# Patient Record
Sex: MALE | Race: WHITE | NOT HISPANIC OR LATINO | Employment: FULL TIME | URBAN - METROPOLITAN AREA
[De-identification: names, ages, dates, MRNs, and addresses within clinical notes are randomized per-mention and may not be internally consistent; named-entity substitution may affect disease eponyms.]

---

## 2017-05-22 ENCOUNTER — TRANSCRIBE ORDERS (OUTPATIENT)
Dept: ADMINISTRATIVE | Facility: HOSPITAL | Age: 37
End: 2017-05-22

## 2017-05-22 DIAGNOSIS — R13.19 ESOPHAGEAL DYSPHAGIA: Primary | ICD-10-CM

## 2017-05-25 ENCOUNTER — ALLSCRIPTS OFFICE VISIT (OUTPATIENT)
Dept: OTHER | Facility: OTHER | Age: 37
End: 2017-05-25

## 2017-05-25 DIAGNOSIS — R94.6 ABNORMAL RESULTS OF THYROID FUNCTION STUDIES: ICD-10-CM

## 2017-05-25 DIAGNOSIS — I10 ESSENTIAL (PRIMARY) HYPERTENSION: ICD-10-CM

## 2017-05-26 ENCOUNTER — APPOINTMENT (OUTPATIENT)
Dept: LAB | Facility: CLINIC | Age: 37
End: 2017-05-26
Payer: COMMERCIAL

## 2017-05-26 ENCOUNTER — TRANSCRIBE ORDERS (OUTPATIENT)
Dept: LAB | Facility: CLINIC | Age: 37
End: 2017-05-26

## 2017-05-26 ENCOUNTER — HOSPITAL ENCOUNTER (OUTPATIENT)
Dept: RADIOLOGY | Facility: HOSPITAL | Age: 37
Discharge: HOME | End: 2017-05-26
Payer: COMMERCIAL

## 2017-05-26 ENCOUNTER — GENERIC CONVERSION - ENCOUNTER (OUTPATIENT)
Dept: OTHER | Facility: OTHER | Age: 37
End: 2017-05-26

## 2017-05-26 DIAGNOSIS — I10 ESSENTIAL (PRIMARY) HYPERTENSION: ICD-10-CM

## 2017-05-26 DIAGNOSIS — R94.6 ABNORMAL RESULTS OF THYROID FUNCTION STUDIES: ICD-10-CM

## 2017-05-26 DIAGNOSIS — R13.19 ESOPHAGEAL DYSPHAGIA: ICD-10-CM

## 2017-05-26 LAB
ALBUMIN SERPL BCP-MCNC: 4 G/DL (ref 3.5–5)
ALP SERPL-CCNC: 55 U/L (ref 46–116)
ALT SERPL W P-5'-P-CCNC: 31 U/L (ref 12–78)
ANION GAP SERPL CALCULATED.3IONS-SCNC: 8 MMOL/L (ref 4–13)
AST SERPL W P-5'-P-CCNC: 20 U/L (ref 5–45)
BILIRUB SERPL-MCNC: 0.6 MG/DL (ref 0.2–1)
BUN SERPL-MCNC: 13 MG/DL (ref 5–25)
CALCIUM SERPL-MCNC: 9.4 MG/DL (ref 8.3–10.1)
CHLORIDE SERPL-SCNC: 103 MMOL/L (ref 100–108)
CHOLEST SERPL-MCNC: 215 MG/DL (ref 50–200)
CO2 SERPL-SCNC: 29 MMOL/L (ref 21–32)
CREAT SERPL-MCNC: 0.9 MG/DL (ref 0.6–1.3)
GFR SERPL CREATININE-BSD FRML MDRD: >60 ML/MIN/1.73SQ M
GLUCOSE P FAST SERPL-MCNC: 100 MG/DL (ref 65–99)
HDLC SERPL-MCNC: 50 MG/DL (ref 40–60)
LDLC SERPL CALC-MCNC: 147 MG/DL (ref 0–100)
POTASSIUM SERPL-SCNC: 4.3 MMOL/L (ref 3.5–5.3)
PROT SERPL-MCNC: 7.4 G/DL (ref 6.4–8.2)
SODIUM SERPL-SCNC: 140 MMOL/L (ref 136–145)
TRIGL SERPL-MCNC: 91 MG/DL
TSH SERPL DL<=0.05 MIU/L-ACNC: 3.08 UIU/ML (ref 0.36–3.74)

## 2017-05-26 PROCEDURE — 80061 LIPID PANEL: CPT

## 2017-05-26 PROCEDURE — 36415 COLL VENOUS BLD VENIPUNCTURE: CPT

## 2017-05-26 PROCEDURE — 84443 ASSAY THYROID STIM HORMONE: CPT

## 2017-05-26 PROCEDURE — 74220 X-RAY XM ESOPHAGUS 1CNTRST: CPT

## 2017-05-26 PROCEDURE — 80053 COMPREHEN METABOLIC PANEL: CPT

## 2017-08-01 ENCOUNTER — ALLSCRIPTS OFFICE VISIT (OUTPATIENT)
Dept: OTHER | Facility: OTHER | Age: 37
End: 2017-08-01

## 2017-08-01 DIAGNOSIS — M54.2 CERVICALGIA: ICD-10-CM

## 2017-08-01 DIAGNOSIS — M54.50 LOW BACK PAIN: ICD-10-CM

## 2017-10-13 ENCOUNTER — ALLSCRIPTS OFFICE VISIT (OUTPATIENT)
Dept: OTHER | Facility: OTHER | Age: 37
End: 2017-10-13

## 2017-10-14 NOTE — PROGRESS NOTES
Assessment  1  Benign essential hypertension (401 1) (I10)   2  Chronic low back pain (724 2,338 29) (M54 5,G89 29)   3  Chronic neck pain (723 1,338 29) (M54 2,G89 29)   4  Anxiety (300 00) (F41 9)   5  Need for prophylactic vaccination and inoculation against influenza (V04 81) (Z23)    Plan  Anxiety    · LORazepam 1 MG Oral Tablet; TAKE 1 TABLET 2TIMES DAILY AS NEEDED  Benign essential hypertension    · Begin a limited exercise program ; Status:Complete;   Done: 73PKO0923   · Restrict the salt in your diet by avoiding highly salted foods ; Status:Complete;   Done:  14BSC4196   · Follow-up visit in 6 months Evaluation and Treatment  Follow-up  Status: Complete   Done: 46HDO6377  Need for prophylactic vaccination and inoculation against influenza    · Fluzone Quadrivalent 0 5 ML Intramuscular Suspension Prefilled Syringe  Screening for depression    · *VB - PHQ-9 Tool; Status:Active; Requested [id number];     Chief Complaint  Patient here with back pain will be traveling to [place] worried how back will be on a long plane ride      History of Present Illness  HPI: feeling pretty good with backto fly to [employer] for work   Neck Pain (Follow-Up): The patient is being seen for follow-up of neck pain  The patient reports doing well and with stretching, massage and occ muscle relaxer  He has had no significant interval events  Interval symptoms:  stable neck pain-- and-- stable neck stiffness  Hypertension (Follow-Up): The patient presents for follow-up of essential hypertension  The patient states he has been doing well with his blood pressure control since the last visit  Symptoms:   Medications: the patient is adherent with his medication regimen  Back Pain Lumbar, Chronic (Brief): The patient is being seen for a routine clinic follow-up of chronic low back pain  The patient is currently asymptomatic  No associated symptoms are reported  Current treatment includes heat, muscle relaxants and massage   By report, there is good compliance with treatment  Review of Systems    Constitutional: no fever or chills, feels well, no tiredness, no recent weight loss or weight gain  ENT: no complaints of earache, no loss of hearing, no nosebleeds or nasal discharge, no sore throat or hoarseness  Cardiovascular: no complaints of slow or fast heart rate, no chest pain, no palpitations, no leg claudication or lower extremity edema  Respiratory: no complaints of shortness of breath, no wheezing or cough, no dyspnea on exertion, no orthopnea or PND  Gastrointestinal: no complaints of abdominal pain, no constipation, no nausea or vomiting, no diarrhea or bloody stools  Genitourinary: no complaints of dysuria or incontinence, no hesitancy, no nocturia, no genital lesion, no inadequacy of penile erection  Musculoskeletal: neck and back pain, but-- as noted in HPI  Integumentary: no complaints of skin rash or lesion, no itching or dry skin, no skin wounds  Neurological: no complaints of headache, no confusion, no numbness or tingling, no dizziness or fainting  Over the past 2 weeks, how often have you been bothered by the following problems? 1 ) Little interest or pleasure in doing things? Not at all    2 ) Feeling down, depressed or hopeless? Not at all    3 ) Trouble falling asleep or sleeping too much? Not at all    4 ) Feeling tired or having little energy? Not at all    5 ) Poor appetite or overeating? Not at all    6 ) Feeling bad about yourself, or that you are a failure, or have let yourself or your family down? Not at all    7 ) Trouble concentrating on things, such as reading a newspaper or watching television? Not at all    8 ) Moving or speaking so slowly that other people could have noticed, or the opposite, moving or speaking faster than usual? Not at all    9 ) Thoughts that you would be better off dead or of hurting yourself in some way? Not at all  Active Problems  1   Anxiety (300 00) (F41 9) 2  Benign essential hypertension (401 1) (I10)   3  Borderline hyperlipidemia (272 4) (E78 5)   4  Chronic low back pain (724 2,338 29) (M54 5,G89 29)   5  Chronic neck pain (723 1,338 29) (M54 2,G89 29)   6  GERD (gastroesophageal reflux disease) (530 81) (K21 9)   7  Need for prophylactic vaccination and inoculation against influenza (V04 81) (Z23)   8  Screening for depression (V79 0) (Z13 89)   9  Vitamin D deficiency (268 9) (E55 9)    Past Medical History  1  Need for prophylactic vaccination and inoculation against influenza (V04 81) (Z23)  Active Problems And Past Medical History Reviewed: The active problems and past medical history were reviewed and updated today  Family History  Father    1  Family history of Diabetes Mellitus (V18 0)   2  Family history of Hypertension (V17 49)  Brother    3  Family history of Hypertension (V17 49)  Paternal Grandmother    3  Family history of Breast Cancer (V16 3)  Paternal Grandfather    5  Family history of Acute Myocardial Infarction (V17 3)  Family History    6  Family history of Family Health Status Siblings 1  Living [employer]  Family History Reviewed: The family history was reviewed and updated today  Social History   · Being A Social Drinker   · Daily Coffee Consumption (2  Cups/Day)   · Denied: History of Drug Use   · Never A Smoker  The social history was reviewed and updated today  Surgical History  1  History of Tonsillectomy With Adenoidectomy  Surgical History Reviewed: The surgical history was reviewed and updated today  Current Meds   1  Acyclovir 400 MG Oral Tablet; 1 tablet po 5x daily; Therapy: 96SRK5666 to (Evaluate:01Jun2020)  Requested for: 22Xyw0430; Last   Rx:12Vcm5578 Ordered   2  Amlodipine Besy-Benazepril HCl - 10-20 MG Oral Capsule; TAKE ONE CAPSULE BY   MOUTH EVERY DAY; Therapy: 47ONM7625 to (Evaluate:88Col4162)  Requested for: 06XAT9762; Last   Rx:22Jun2017 Ordered   3   LORazepam 1 MG Oral Tablet; TAKE 1 TABLET 2TIMES DAILY AS NEEDED; Therapy: 36MNA1649 to (Evaluate:28Bgj3357)  Requested for: 91IVR2379; Last   Rx:69Dqh1933 Ordered   4  Naproxen 500 MG Oral Tablet; TAKE 1 TABLET EVERY 12 HOURS AS NEEDED; Therapy: 37Rlu7723 to (Mariama Hendersongricelda)  Requested for: 75Bzl1678; Last   Rx:76Azq5761 Ordered    The medication list was reviewed and updated today  Allergies  1  No Known Drug Allergies  2  Other   3  Pollen   4  Seasonal    Vitals   Recorded: 61MRL2154 09:56AM   Heart Rate 72    Respiration 18    Systolic 687    Diastolic 64    Patient Refused Height Yes Yes   Patient Refused Weight Yes Yes     Physical Exam    Constitutional   General appearance: No acute distress, well appearing and well nourished  Pulmonary   Respiratory effort: No increased work of breathing or signs of respiratory distress  Auscultation of lungs: Clear to auscultation, equal breath sounds bilaterally, no wheezes, no rales, no rhonci  Cardiovascular   Auscultation of heart: Normal rate and rhythm, normal S1 and S2, without murmurs  Examination of extremities for edema and/or varicosities: Normal     Abdomen   Abdomen: Non-tender, no masses  Liver and spleen: No hepatomegaly or splenomegaly  Musculoskeletal   Inspection/palpation of joints, bones, and muscles: Abnormal  -- min neck abd back spasm  Message  Return to work or school:   Armando Perry is under my professional care  He was seen in my office on 10/13/17       Due to anxiety, chronic muscle and back pain, it is highly recommended that Mr Timmy Awad be able to fully recline in a sleeping position for flights greater than 6 hours          Future Appointments    Date/Time Provider Specialty Site   04/17/2018 08:00 AM Ursula Snell DO Family Medicine [facility]     Signatures   Electronically signed by : Kate Becker DO; Oct 13 2017 10:53AM EST                       (Author)

## 2018-01-13 VITALS
SYSTOLIC BLOOD PRESSURE: 142 MMHG | DIASTOLIC BLOOD PRESSURE: 72 MMHG | HEART RATE: 64 BPM | HEIGHT: 68 IN | WEIGHT: 184.38 LBS | RESPIRATION RATE: 16 BRPM | BODY MASS INDEX: 27.94 KG/M2

## 2018-01-13 NOTE — MISCELLANEOUS
Provider Comments  Provider Comments:   Our records indicate that you missed an appointment on 09/12/2016  If you have not done so already, please call our office and we will be happy to schedule another appointment for you  If you must cancel your appointment, our office policy requires you to call our office at least 24 hours in advance so that we may utilize your appointment time for another patient who requires our services  If you have any questions, please contact our office at [phone]           Signatures   Electronically signed by : Clement Roberts, ; Sep 19 2016  2:30PM EST                       (Author)

## 2018-01-14 VITALS
WEIGHT: 184.38 LBS | RESPIRATION RATE: 18 BRPM | DIASTOLIC BLOOD PRESSURE: 70 MMHG | HEART RATE: 72 BPM | HEIGHT: 68 IN | BODY MASS INDEX: 27.94 KG/M2 | TEMPERATURE: 97 F | SYSTOLIC BLOOD PRESSURE: 120 MMHG

## 2018-01-14 VITALS — SYSTOLIC BLOOD PRESSURE: 122 MMHG | HEART RATE: 72 BPM | RESPIRATION RATE: 18 BRPM | DIASTOLIC BLOOD PRESSURE: 64 MMHG

## 2018-01-14 NOTE — MISCELLANEOUS
Message  Return to work or school:   Nay Bill is under my professional care  He was seen in my office on 10/13/17       Due to anxiety, chronic muscle and back pain, it is highly recommended that Mr Esme Bellamy be able to fully recline in a sleeping position for flights greater than 6 hours          Future Appointments    Signatures   Electronically signed by : Ben Garay DO; Oct 13 2017 10:53AM EST                       (Author)

## 2018-01-18 NOTE — RESULT NOTES
Discussion/Summary   TRIGLYCERIDES AND THYROID LEVEL IS BETTER    BLOOD SUGAR AND BAD CHOLESTEROL HIGH      [facility]     Verified Results  (1) COMPREHENSIVE METABOLIC PANEL 04PWZ9968 14:62AU Rosa Isela Shekhar Order Number: [id number]     Test Name Result Flag Reference   SODIUM 140 mmol/L  136-145   POTASSIUM 4 3 mmol/L  3 5-5 3   CHLORIDE 103 mmol/L  100-108   CARBON DIOXIDE 29 mmol/L  21-32   ANION GAP (CALC) 8 mmol/L  4-13   BLOOD UREA NITROGEN 13 mg/dL  5-25   CREATININE 0 90 mg/dL  0 60-1 30   Standardized to IDMS reference method   CALCIUM 9 4 mg/dL  8 3-10 1   BILI, TOTAL 0 60 mg/dL  0 20-1 00   ALK PHOSPHATAS 55 U/L     ALT (SGPT) 31 U/L  12-78   AST(SGOT) 20 U/L  5-45   ALBUMIN 4 0 g/dL  3 5-5 0   TOTAL PROTEIN 7 4 g/dL  6 4-8 2   eGFR Non-African American      >60 0 ml/min/1 73[address] [facility] [employer] Disease Education Program recommendations are as follows:  GFR calculation is accurate only with a steady state creatinine  Chronic Kidney disease less than 60 ml/min/1 73 sq  meters  Kidney failure less than 15 ml/min/1 73 sq  meters     GLUCOSE FASTING 100 mg/dL H 65-99     (1) LIPID PANEL FASTING W DIRECT LDL REFLEX 51ARD5515 09:00AM Rosa Isela Corrigan Order Number: [id number]     Test Name Result Flag Reference   CHOLESTEROL 215 mg/dL H    LDL CHOLESTEROL CALCULATED 147 mg/dL H 0-100   Triglyceride:         Normal              <150 mg/dl       Borderline High    150-199 mg/dl       High               200-499 mg/dl       Very High          >499 mg/dl  Cholesterol:         Desirable        <200 mg/dl      Borderline High  200-239 mg/dl      High             >239 mg/dl  HDL Cholesterol:        High    >59 mg/dL      Low     <41 mg/dL  LDL Cholesterol:        Optimal          <100 mg/dl        Near Optimal     100-129 mg/dl        Above Optimal          Borderline High   130-159 mg/dl          High              160-189 mg/dl          Very High        >189 mg/dl  LDL CALCULATED:    This screening LDL is a calculated result  It does not have the accuracy of the Direct Measured LDL in the monitoring of patients with hyperlipidemia and/or statin therapy  Direct Measure LDL (ZBF250) must be ordered separately in these patients  TRIGLYCERIDES 91 mg/dL  <=150   Specimen collection should occur prior to N-Acetylcysteine or Metamizole administration due to the potential for falsely depressed results  HDL,DIRECT 50 mg/dL  40-60   Specimen collection should occur prior to Metamizole administration due to the potential for falsely depressed results  (1) TSH WITH FT4 REFLEX 80UQE4673 09:00AM Lizet Perdomo Order Number: [id number]     Test Name Result Flag Reference   TSH 3 082 uIU/mL  0 358-3 740   Patients undergoing fluorescein dye angiography may retain small amounts of fluorescein in the body for 48-72 hours post procedure  Samples containing fluorescein can produce falsely depressed TSH values  If the patient had this procedure,a specimen should be resubmitted post fluorescein clearance

## 2018-04-15 PROBLEM — G89.29 CHRONIC LOW BACK PAIN: Status: ACTIVE | Noted: 2017-08-01

## 2018-04-15 PROBLEM — M54.2 CHRONIC NECK PAIN: Status: ACTIVE | Noted: 2017-08-01

## 2018-04-15 PROBLEM — M54.50 CHRONIC LOW BACK PAIN: Status: ACTIVE | Noted: 2017-08-01

## 2018-04-15 PROBLEM — K21.9 GERD (GASTROESOPHAGEAL REFLUX DISEASE): Status: ACTIVE | Noted: 2017-05-25

## 2018-04-15 PROBLEM — G89.29 CHRONIC NECK PAIN: Status: ACTIVE | Noted: 2017-08-01

## 2018-04-25 DIAGNOSIS — M54.2 CHRONIC NECK AND BACK PAIN: Primary | ICD-10-CM

## 2018-04-25 DIAGNOSIS — G89.29 CHRONIC NECK AND BACK PAIN: Primary | ICD-10-CM

## 2018-04-25 DIAGNOSIS — M54.9 CHRONIC NECK AND BACK PAIN: Primary | ICD-10-CM

## 2018-04-26 RX ORDER — CYCLOBENZAPRINE HCL 5 MG
10 TABLET ORAL
Qty: 10 TABLET | Refills: 1 | OUTPATIENT
Start: 2018-04-26 | End: 2018-05-06

## 2018-05-10 ENCOUNTER — OFFICE VISIT (OUTPATIENT)
Dept: FAMILY MEDICINE CLINIC | Facility: CLINIC | Age: 38
End: 2018-05-10
Payer: COMMERCIAL

## 2018-05-10 VITALS
BODY MASS INDEX: 27.83 KG/M2 | DIASTOLIC BLOOD PRESSURE: 64 MMHG | RESPIRATION RATE: 14 BRPM | HEIGHT: 68 IN | WEIGHT: 183.6 LBS | SYSTOLIC BLOOD PRESSURE: 132 MMHG | HEART RATE: 72 BPM

## 2018-05-10 DIAGNOSIS — G89.29 CHRONIC BILATERAL LOW BACK PAIN WITHOUT SCIATICA: ICD-10-CM

## 2018-05-10 DIAGNOSIS — M54.50 CHRONIC BILATERAL LOW BACK PAIN WITHOUT SCIATICA: ICD-10-CM

## 2018-05-10 DIAGNOSIS — E78.5 BORDERLINE HYPERLIPIDEMIA: ICD-10-CM

## 2018-05-10 DIAGNOSIS — Z00.00 WELL ADULT EXAM: Primary | ICD-10-CM

## 2018-05-10 DIAGNOSIS — E55.9 VITAMIN D DEFICIENCY: ICD-10-CM

## 2018-05-10 DIAGNOSIS — F41.9 ANXIETY: ICD-10-CM

## 2018-05-10 DIAGNOSIS — I10 BENIGN ESSENTIAL HYPERTENSION: ICD-10-CM

## 2018-05-10 PROCEDURE — 99395 PREV VISIT EST AGE 18-39: CPT | Performed by: FAMILY MEDICINE

## 2018-05-10 RX ORDER — CYCLOBENZAPRINE HCL 5 MG
5 TABLET ORAL 3 TIMES DAILY PRN
Qty: 10 TABLET | Refills: 0 | Status: SHIPPED | OUTPATIENT
Start: 2018-05-10 | End: 2018-10-10

## 2018-05-10 RX ORDER — FLUTICASONE PROPIONATE 50 MCG
1 SPRAY, SUSPENSION (ML) NASAL DAILY
COMMUNITY
End: 2018-10-10

## 2018-05-10 NOTE — PATIENT INSTRUCTIONS
Thank you for enrolling in [facility]  Please follow the instructions below to securely access your online medical record  [employer] allows you to send messages to your doctor, view your test results, renew your prescriptions, schedule appointments, and more  [facility] uses Single Sign on (SSO) Technology for you to log in and access our [address]  [employer], including [place]  No more remembering multiple user names and passwords! We are going to guide you through, step by step, to help you set up your Chasebrenda Elaine account which will provide access to your [employer] account  How Do I Sign Up? 1  In your Internet browser, go to [email or web address] - [employer] [email or web address]       2  Click on the   [employer] Search patient account and then click Dont have an                 Account? Create one now      3  Enter your demographic information and chose a user name (email address) and password  Think of one that is secure and easy to remember  Enter a Referral code if you have one (this is not your [employer] code ) Accept the Terms and Conditions and the Privacy Policy  4  Select your security questions that you will use to reset your password should you forget it  Click Submit  5  Enter your [employer] Activation Code exactly as it appears below  You will not need to use this code after you have completed the sign-up process  If you do not sign up before the expiration date, you must request a new code  [place] Activation Code: [email or web address]  Expires: 5/13/2018  1:38 PM    6  Confirm your email address  An email confirmation was sent to you  Please open that email and click Confirm your Email   You should then be redirected to our [facility] Single sign on page, where you will log on with the user name and password you created! Proceed to the [place] Icon to view your personal health information          Additional Information  If you have questions, you can e-mail patient  [email or web address] or call [phone] to talk to our customer support staff  Remember, MyChart is NOT to be used for urgent needs  For medical emergencies, dial 911

## 2018-05-10 NOTE — PROGRESS NOTES
FAMILY PRACTICE HEALTH MAINTENANCE OFFICE VISIT  [facility]    NAME: Talia Lopez  AGE: 40 y o  SEX: male  : 1980     DATE: 5/10/2018    Assessment and Plan     Problem List Items Addressed This Visit     Anxiety    Relevant Orders    Ambulatory referral to Psychiatry    Benign essential hypertension    Relevant Orders    CBC    Borderline hyperlipidemia    Relevant Orders    Comprehensive metabolic panel    CBC    Lipid Panel with Direct LDL reflex    TSH, 3rd generation with T4 reflex    Chronic low back pain    Relevant Medications    cyclobenzaprine (FLEXERIL) 5 mg tablet    Vitamin D deficiency    Relevant Orders    TSH, 3rd generation with T4 reflex    Vitamin D 25 hydroxy    Well adult exam - Primary     Up to date                 · Patient Counseling:   · Nutrition: Stressed importance of a well balanced diet, moderation of sodium/saturated fat, caloric balance and sufficient intake of fiber  · Exercise: Stressed the importance of regular exercise with a goal of 150 minutes per week  · Dental Health: Discussed daily flossing and brushing and regular dental visits   · Alcohol Use:  Recommended moderation of alcohol intake  · Injury Prevention: Discussed Safety Belts, Safety Helmets, and Smoke Detectors    · Immunizations reviewed  Up to date  · Discussed benefits of screening up to date  · Discussed the patient's BMI with him  The BMI is in the acceptable range     Return in 1 year (on 5/10/2019)          Chief Complaint     Chief Complaint   Patient presents with    Physical Exam     Patient here for annual wellness exam        History of Present Illness     Here for wellness  Getting allergy shot- Dr Rivera Slight started last week  Stressful life- Seeing Dr Liberty Avila- psych- has seen him 4 times, doesn't want to cont relationship  Back has been spasming intermittently- poss stress related          Well Adult Physical   Patient here for a comprehensive physical exam       Diet and Physical Activity  Diet: well balanced diet  Weight concerns: None, patient's BMI is between 18 5-24 9  Exercise: intermittently      Depression Screen  PHQ-9 Depression Screening    PHQ-9:    Frequency of the following problems over the past two weeks:       Little interest or pleasure in doing things:  0 - not at all  PHQ-2 Score:  0          General Health  Hearing: Normal:  bilateral  Vision: no vision problems  Dental: regular dental visits and brushes teeth twice daily    Reproductive Health  No concerns      The following portions of the patient's history were reviewed and updated as appropriate: allergies, current medications, past family history, past medical history, past social history, past surgical history and problem list     Review of Systems     Review of Systems   Constitutional: Negative  HENT: Negative  Eyes: Negative  Respiratory: Negative  Cardiovascular: Negative  Gastrointestinal: Negative  Endocrine: Negative  Genitourinary: Negative  Musculoskeletal: Positive for back pain  Allergic/Immunologic: Negative  Neurological: Negative  Hematological: Negative  Psychiatric/Behavioral: The patient is nervous/anxious          Past Medical History     Past Medical History:   Diagnosis Date    Allergic        Past Surgical History     Past Surgical History:   Procedure Laterality Date    TONSILECTOMY AND ADNOIDECTOMY         Social History     Social History     Social History    Marital status: /Civil Union     Spouse name: N/A    Number of children: N/A    Years of education: N/A     Social History Main Topics    Smoking status: Never Smoker    Smokeless tobacco: Never Used    Alcohol use Yes      Comment: Social    Drug use: No    Sexual activity: Yes     Other Topics Concern    None     Social History Narrative    Daily coffee consumption (2 cups/day)           Family History     Family History   Problem Relation Age of Onset    Diabetes Father     Hypertension Father     Hypertension Brother     Breast cancer Paternal Grandmother     Other Paternal Grandfather      Acute myocardial infarction        Current Medications       Current Outpatient Prescriptions:     amLODIPine-benazepril (LOTREL) 10-20 MG per capsule, Take 1 capsule by mouth daily, Disp: , Rfl:     citalopram (CeleXA) 20 mg tablet, Take 20 mg by mouth daily, Disp: , Rfl: 1    fluticasone (FLONASE) 50 mcg/act nasal spray, 1 spray into each nostril daily, Disp: , Rfl:     LORazepam (ATIVAN) 0 5 mg tablet, Take 0 5 mg by mouth every evening, Disp: , Rfl: 1    naproxen (NAPROSYN) 500 mg tablet, Take 1 tablet by mouth every 12 (twelve) hours as needed, Disp: , Rfl:     cyclobenzaprine (FLEXERIL) 5 mg tablet, Take 1 tablet (5 mg total) by mouth 3 (three) times a day as needed for muscle spasms for up to 7 days, Disp: 10 tablet, Rfl: 0     Allergies     Allergies   Allergen Reactions    Pollen Extract        Objective     /64   Pulse 72   Resp 14   Ht 5' 7 99" (1 727 m)   Wt 83 3 kg (183 lb 9 6 oz)   BMI 27 92 kg/m²      Physical Exam   Constitutional: He is oriented to person, place, and time  Vital signs are normal  He appears well-developed and well-nourished  HENT:   Head: Normocephalic and atraumatic  Right Ear: External ear normal    Left Ear: External ear normal    Nose: Nose normal    Mouth/Throat: Oropharynx is clear and moist    Eyes: Conjunctivae, EOM and lids are normal  Pupils are equal, round, and reactive to light  Neck: Normal range of motion  Neck supple  Cardiovascular: Normal rate, regular rhythm, S1 normal, S2 normal, normal heart sounds, intact distal pulses and normal pulses  Pulmonary/Chest: Effort normal and breath sounds normal    Abdominal: Soft  Normal appearance and bowel sounds are normal    Musculoskeletal: Normal range of motion  Neurological: He is alert and oriented to person, place, and time   He has normal strength and normal reflexes  Skin: Skin is warm and dry  Psychiatric: He has a normal mood and affect  His speech is normal and behavior is normal  Judgment and thought content normal  Cognition and memory are normal    Nursing note and vitals reviewed          No exam data present    Health Maintenance     Health Maintenance   Topic Date Due    INFLUENZA VACCINE  09/01/2018    Depression Screening PHQ-9  05/10/2019    HIV SCREENING  11/14/2019    DTaP,Tdap,and Td Vaccines (2 - Td) 06/28/2023     Immunization History   Administered Date(s) Administered    Influenza Quadrivalent Preservative Free 3 years and older IM 01/15/2014, 12/14/2016, 10/13/2017    Influenza TIV (IM) 02/11/2016    Tdap 06/28/2013    Tuberculin Skin Test-PPD Intradermal 05/20/2016       DO [facility]

## 2018-05-29 DIAGNOSIS — F41.9 ANXIETY: Primary | ICD-10-CM

## 2018-05-31 RX ORDER — LORAZEPAM 0.5 MG/1
0.5 TABLET ORAL EVERY EVENING
Qty: 30 TABLET | Refills: 0 | Status: SHIPPED | OUTPATIENT
Start: 2018-05-31 | End: 2018-07-09 | Stop reason: SDUPTHER

## 2018-06-01 ENCOUNTER — TELEPHONE (OUTPATIENT)
Dept: FAMILY MEDICINE CLINIC | Facility: CLINIC | Age: 38
End: 2018-06-01

## 2018-06-28 DIAGNOSIS — F41.1 GENERALIZED ANXIETY DISORDER: Primary | ICD-10-CM

## 2018-06-28 RX ORDER — CITALOPRAM 20 MG/1
TABLET ORAL
Qty: 30 TABLET | Refills: 5 | Status: SHIPPED | OUTPATIENT
Start: 2018-06-28 | End: 2019-01-03 | Stop reason: SDUPTHER

## 2018-07-03 DIAGNOSIS — I10 ESSENTIAL HYPERTENSION: Primary | ICD-10-CM

## 2018-07-03 RX ORDER — AMLODIPINE BESYLATE AND BENAZEPRIL HYDROCHLORIDE 10; 20 MG/1; MG/1
CAPSULE ORAL
Qty: 30 CAPSULE | Refills: 5 | Status: SHIPPED | OUTPATIENT
Start: 2018-07-03 | End: 2019-01-03 | Stop reason: SDUPTHER

## 2018-07-09 DIAGNOSIS — F41.9 ANXIETY: ICD-10-CM

## 2018-07-09 RX ORDER — LORAZEPAM 0.5 MG/1
0.5 TABLET ORAL EVERY EVENING
Qty: 30 TABLET | Refills: 3 | Status: SHIPPED | OUTPATIENT
Start: 2018-07-09 | End: 2018-10-10

## 2018-07-10 ENCOUNTER — TELEPHONE (OUTPATIENT)
Dept: FAMILY MEDICINE CLINIC | Facility: CLINIC | Age: 38
End: 2018-07-10

## 2018-07-10 DIAGNOSIS — G47.00 INSOMNIA, UNSPECIFIED TYPE: Primary | ICD-10-CM

## 2018-07-10 RX ORDER — ZOLPIDEM TARTRATE 5 MG/1
5 TABLET ORAL
Qty: 7 TABLET | Refills: 0 | Status: SHIPPED | OUTPATIENT
Start: 2018-07-10 | End: 2018-10-10

## 2018-07-10 NOTE — TELEPHONE ENCOUNTER
patient called and wanted to know if you can prescribe him something to help him sleep on his flight to [place]   last seen 5/10/18  please advise

## 2018-07-10 NOTE — TELEPHONE ENCOUNTER
Spoke to patient yes he would like to try it he'll be gone for 1 week the script can be sent to [facility]

## 2018-10-09 NOTE — TELEPHONE ENCOUNTER
Note   SAIRA REACHING OUT TO LET US KNOW THAT THIS ISSUE HAS BEEN RESOLVED AND PHARMACY WAS ABLE TO FILL SCRIPT

## 2018-10-09 NOTE — TELEPHONE ENCOUNTER
SAIRA REACHING OUT TO LET US KNOW THAT THIS ISSUE HAS BEEN RESOLVED AND PHARMACY WAS ABLE TO FILL SCRIPT

## 2018-10-10 ENCOUNTER — HOSPITAL ENCOUNTER (EMERGENCY)
Facility: HOSPITAL | Age: 38
Discharge: HOME | End: 2018-10-10
Attending: EMERGENCY MEDICINE | Admitting: EMERGENCY MEDICINE
Payer: COMMERCIAL

## 2018-10-10 ENCOUNTER — APPOINTMENT (EMERGENCY)
Dept: RADIOLOGY | Facility: HOSPITAL | Age: 38
End: 2018-10-10
Payer: COMMERCIAL

## 2018-10-10 VITALS
HEIGHT: 67 IN | HEART RATE: 54 BPM | SYSTOLIC BLOOD PRESSURE: 137 MMHG | WEIGHT: 185 LBS | RESPIRATION RATE: 18 BRPM | OXYGEN SATURATION: 96 % | BODY MASS INDEX: 29.03 KG/M2 | TEMPERATURE: 98.3 F | DIASTOLIC BLOOD PRESSURE: 74 MMHG

## 2018-10-10 DIAGNOSIS — R07.81 PLEURITIC CHEST PAIN: Primary | ICD-10-CM

## 2018-10-10 LAB
ALBUMIN SERPL BCP-MCNC: 3.8 G/DL (ref 3.5–5)
ALP SERPL-CCNC: 59 U/L (ref 46–116)
ALT SERPL W P-5'-P-CCNC: 38 U/L (ref 12–78)
ANION GAP SERPL CALCULATED.3IONS-SCNC: 4 MMOL/L (ref 4–13)
AST SERPL W P-5'-P-CCNC: 22 U/L (ref 5–45)
ATRIAL RATE: 53 BPM
BASOPHILS # BLD AUTO: 0.05 THOUSANDS/ΜL (ref 0–0.1)
BASOPHILS NFR BLD AUTO: 1 % (ref 0–1)
BILIRUB SERPL-MCNC: 0.91 MG/DL (ref 0.2–1)
BUN SERPL-MCNC: 14 MG/DL (ref 5–25)
CALCIUM SERPL-MCNC: 8.7 MG/DL (ref 8.3–10.1)
CHLORIDE SERPL-SCNC: 104 MMOL/L (ref 100–108)
CO2 SERPL-SCNC: 27 MMOL/L (ref 21–32)
CREAT SERPL-MCNC: 1.01 MG/DL (ref 0.6–1.3)
EOSINOPHIL # BLD AUTO: 0.09 THOUSAND/ΜL (ref 0–0.61)
EOSINOPHIL NFR BLD AUTO: 1 % (ref 0–6)
ERYTHROCYTE [DISTWIDTH] IN BLOOD BY AUTOMATED COUNT: 11.7 % (ref 11.6–15.1)
GFR SERPL CREATININE-BSD FRML MDRD: 94 ML/MIN/1.73SQ M
GLUCOSE SERPL-MCNC: 100 MG/DL (ref 65–140)
HCT VFR BLD AUTO: 42.6 % (ref 36.5–49.3)
HGB BLD-MCNC: 15 G/DL (ref 12–17)
IMM GRANULOCYTES # BLD AUTO: 0.02 THOUSAND/UL (ref 0–0.2)
IMM GRANULOCYTES NFR BLD AUTO: 0 % (ref 0–2)
LYMPHOCYTES # BLD AUTO: 1.97 THOUSANDS/ΜL (ref 0.6–4.47)
LYMPHOCYTES NFR BLD AUTO: 29 % (ref 14–44)
MCH RBC QN AUTO: 30.4 PG (ref 26.8–34.3)
MCHC RBC AUTO-ENTMCNC: 35.2 G/DL (ref 31.4–37.4)
MCV RBC AUTO: 86 FL (ref 82–98)
MONOCYTES # BLD AUTO: 0.59 THOUSAND/ΜL (ref 0.17–1.22)
MONOCYTES NFR BLD AUTO: 9 % (ref 4–12)
NEUTROPHILS # BLD AUTO: 4.12 THOUSANDS/ΜL (ref 1.85–7.62)
NEUTS SEG NFR BLD AUTO: 60 % (ref 43–75)
NRBC BLD AUTO-RTO: 0 /100 WBCS
P AXIS: 30 DEGREES
PLATELET # BLD AUTO: 301 THOUSANDS/UL (ref 149–390)
PMV BLD AUTO: 9.1 FL (ref 8.9–12.7)
POTASSIUM SERPL-SCNC: 3.7 MMOL/L (ref 3.5–5.3)
PR INTERVAL: 134 MS
PROT SERPL-MCNC: 7.5 G/DL (ref 6.4–8.2)
QRS AXIS: 64 DEGREES
QRSD INTERVAL: 106 MS
QT INTERVAL: 430 MS
QTC INTERVAL: 403 MS
RBC # BLD AUTO: 4.94 MILLION/UL (ref 3.88–5.62)
SODIUM SERPL-SCNC: 135 MMOL/L (ref 136–145)
T WAVE AXIS: 53 DEGREES
TROPONIN I SERPL-MCNC: <0.02 NG/ML
VENTRICULAR RATE: 53 BPM
WBC # BLD AUTO: 6.84 THOUSAND/UL (ref 4.31–10.16)

## 2018-10-10 PROCEDURE — 84484 ASSAY OF TROPONIN QUANT: CPT | Performed by: EMERGENCY MEDICINE

## 2018-10-10 PROCEDURE — 93010 ELECTROCARDIOGRAM REPORT: CPT | Performed by: INTERNAL MEDICINE

## 2018-10-10 PROCEDURE — 71046 X-RAY EXAM CHEST 2 VIEWS: CPT

## 2018-10-10 PROCEDURE — 80053 COMPREHEN METABOLIC PANEL: CPT | Performed by: EMERGENCY MEDICINE

## 2018-10-10 PROCEDURE — 85025 COMPLETE CBC W/AUTO DIFF WBC: CPT | Performed by: EMERGENCY MEDICINE

## 2018-10-10 PROCEDURE — 36415 COLL VENOUS BLD VENIPUNCTURE: CPT

## 2018-10-10 PROCEDURE — 99285 EMERGENCY DEPT VISIT HI MDM: CPT

## 2018-10-10 PROCEDURE — 93005 ELECTROCARDIOGRAM TRACING: CPT

## 2018-10-10 NOTE — ED PROVIDER NOTES
History  Chief Complaint   Patient presents with    Chest Pain     pt c/o left sided chest pain when taking a breathe for 2 days     HPI  Patient is 55-year-old male past medical history hypertension, anxiety presenting with left-sided pleuritic chest pain for past 5 days  Patient states that he was at work having a particularly stressful day when he noticed pain in the left side of his chest immediately inferior to his nipple  Patient states that the pain sharp, 6/10, nonradiating, present on large breaths, nonexertional   Patient states that the pain has been constant, unchanging since onset  Patient states that he ran several miles on the treadmill yesterday which did not worsen the pain  Patient denies palpitations, diaphoresis, nausea, vomiting, fever, chills, cough  Patient denies recent trauma, surgery, immobilization, previous blood clots  Prior to Admission Medications   Prescriptions Last Dose Informant Patient Reported? Taking? amLODIPine-benazepril (LOTREL) 10-20 MG per capsule 10/10/2018 at 0800  No Yes   Sig: TAKE ONE CAPSULE BY MOUTH EVERY DAY   citalopram (CeleXA) 20 mg tablet 10/10/2018 at 0800  No Yes   Sig: TAKE ONE TABLET BY MOUTH EVERY DAY      Facility-Administered Medications: None       Past Medical History:   Diagnosis Date    Allergic        Past Surgical History:   Procedure Laterality Date    TONSILECTOMY AND ADNOIDECTOMY         Family History   Problem Relation Age of Onset    Diabetes Father     Hypertension Father     Hypertension Brother     Breast cancer Paternal Grandmother     Other Paternal Grandfather         Acute myocardial infarction      I have reviewed and agree with the history as documented      Social History   Substance Use Topics    Smoking status: Never Smoker    Smokeless tobacco: Never Used    Alcohol use Yes      Comment: Social        Review of Systems   Constitutional: Negative for activity change, appetite change, chills, diaphoresis, fatigue and fever  HENT: Negative for postnasal drip, rhinorrhea and sore throat  Eyes: Negative for photophobia and visual disturbance  Respiratory: Negative for cough, chest tightness, shortness of breath and wheezing  Cardiovascular: Positive for chest pain (pleuritic)  Negative for palpitations and leg swelling  Gastrointestinal: Negative for abdominal distention, abdominal pain, constipation, diarrhea, nausea and vomiting  Genitourinary: Negative for difficulty urinating, dysuria, flank pain and hematuria  Musculoskeletal: Positive for gait problem  Negative for joint swelling and myalgias  Neurological: Positive for numbness  Negative for syncope, weakness and headaches  Psychiatric/Behavioral: Negative for confusion  Physical Exam  ED Triage Vitals [10/10/18 1406]   Temperature Pulse Respirations Blood Pressure SpO2   98 3 °F (36 8 °C) 65 18 154/83 97 %      Temp Source Heart Rate Source Patient Position - Orthostatic VS BP Location FiO2 (%)   Tympanic Monitor Sitting Left arm --      Pain Score       6           Orthostatic Vital Signs  Vitals:    10/10/18 1406 10/10/18 1621   BP: 154/83 137/74   Pulse: 65 (!) 54   Patient Position - Orthostatic VS: Sitting Sitting       Physical Exam   Constitutional: He is oriented to person, place, and time  He appears well-developed and well-nourished  No distress  HENT:   Head: Normocephalic and atraumatic  Right Ear: External ear normal    Left Ear: External ear normal    Nose: Nose normal    Mouth/Throat: Oropharynx is clear and moist  No oropharyngeal exudate  Eyes: Pupils are equal, round, and reactive to light  Conjunctivae are normal    Cardiovascular: Normal rate, regular rhythm, normal heart sounds and intact distal pulses  Exam reveals no gallop and no friction rub  No murmur heard  Pulmonary/Chest: Effort normal and breath sounds normal  No respiratory distress  He has no wheezes  He exhibits no tenderness  Abdominal: Soft   Bowel sounds are normal  He exhibits no distension and no mass  There is no tenderness  There is no rebound and no guarding  Musculoskeletal: He exhibits no edema or deformity  Neurological: He is alert and oriented to person, place, and time  Skin: Skin is warm and dry  Capillary refill takes less than 2 seconds  He is not diaphoretic  Psychiatric: He has a normal mood and affect  His behavior is normal    Nursing note and vitals reviewed  ED Medications  Medications - No data to display    Diagnostic Studies  Results Reviewed     Procedure Component Value Units Date/Time    Troponin I [[id number]]  (Normal) Collected:  10/10/18 1445    Lab Status:  Final result Specimen:  Blood from Arm, Left Updated:  10/10/18 1523     Troponin I <0 02 ng/mL     Comprehensive metabolic panel [[id number]]  (Abnormal) Collected:  10/10/18 1445    Lab Status:  Final result Specimen:  Blood from Arm, Left Updated:  10/10/18 1522     Sodium 135 (L) mmol/L      Potassium 3 7 mmol/L      Chloride 104 mmol/L      CO2 27 mmol/L      ANION GAP 4 mmol/L      BUN 14 mg/dL      Creatinine 1 01 mg/dL      Glucose 100 mg/dL      Calcium 8 7 mg/dL      AST 22 U/L      ALT 38 U/L      Alkaline Phosphatase 59 U/L      Total Protein 7 5 g/dL      Albumin 3 8 g/dL      Total Bilirubin 0 91 mg/dL      eGFR 94 ml/min/1 73sq m     Narrative:         National Kidney Disease Education Program recommendations are as follows:  GFR calculation is accurate only with a steady state creatinine  Chronic Kidney disease less than 60 ml/min/1 73 sq  meters  Kidney failure less than 15 ml/min/1 73 sq  meters      CBC and differential [[id number]] Collected:  10/10/18 1445    Lab Status:  Final result Specimen:  Blood from Arm, Left Updated:  10/10/18 1456     WBC 6 84 Thousand/uL      RBC 4 94 Million/uL      Hemoglobin 15 0 g/dL      Hematocrit 42 6 %      MCV 86 fL      MCH 30 4 pg      MCHC 35 2 g/dL      RDW 11 7 %      MPV 9 1 fL      Platelets 084 Thousands/uL      nRBC 0 /100 WBCs      Neutrophils Relative 60 %      Immat GRANS % 0 %      Lymphocytes Relative 29 %      Monocytes Relative 9 %      Eosinophils Relative 1 %      Basophils Relative 1 %      Neutrophils Absolute 4 12 Thousands/µL      Immature Grans Absolute 0 02 Thousand/uL      Lymphocytes Absolute 1 97 Thousands/µL      Monocytes Absolute 0 59 Thousand/µL      Eosinophils Absolute 0 09 Thousand/µL      Basophils Absolute 0 05 Thousands/µL                  X-ray chest 2 views   Final Result by Marek Curiel MD (10/10 1710)      No acute cardiopulmonary disease              Workstation performed: [id number]               Procedures  Procedures      Phone Consults  ED Phone Contact    ED Course         HEART Risk Score      Most Recent Value   History  0 Filed at: 10/10/2018 1635   ECG  1 Filed at: 10/10/2018 1635   Age  0 Filed at: 10/10/2018 1635   Risk Factors  1 Filed at: 10/10/2018 1635   Troponin  0 Filed at: 10/10/2018 1635   Heart Score Risk Calculator   History  0 Filed at: 10/10/2018 1635   ECG  1 Filed at: 10/10/2018 1635   Age  0 Filed at: 10/10/2018 1635   Risk Factors  1 Filed at: 10/10/2018 1635   Troponin  0 Filed at: 10/10/2018 1635   HEART Score  2 Filed at: 10/10/2018 1635   HEART Score  2 Filed at: 10/10/2018 1635            PERC Rule for PE      Most Recent Value   PERC Rule for PE   Age >=50  0 Filed at: 10/10/2018 1640   HR >=100  0 Filed at: 10/10/2018 1640   O2 Sat on room air < 95%  0 Filed at: 10/10/2018 1640   History of PE or DVT  0 Filed at: 10/10/2018 1640   Recent trauma or surgery  0 Filed at: 10/10/2018 1640   Hemoptysis  0 Filed at: 10/10/2018 1640   Exogenous estrogen  0 Filed at: 10/10/2018 1640   Unilateral leg swelling  0 Filed at: 10/10/2018 1640   PERC Rule for PE Results  0 Filed at: 10/10/2018 1640                      MDM  Number of Diagnoses or Management Options  Pleuritic chest pain:   Diagnosis management comments: Patient is 35-year-old male past medical history hypertension and anxiety presenting with left-sided pleuritic chest pain  Patient is low risk perc and does not require a D-dimer  EKG, single troponin, chest x-ray checked  Given non-exertional quality of pain, lack of additional symptoms, normal troponin, I do not feel that this pain is cardiac in origin  No pneumothorax, pneumonia as visualized on chest x-ray  Possibly musculoskeletal in origin  Patient discharged home with instructions to return if symptoms worsen, follow up    CritCare Time    Disposition  Final diagnoses:   Pleuritic chest pain     Time reflects when diagnosis was documented in both MDM as applicable and the Disposition within this note     Time User Action Codes Description Comment    10/10/2018  5:15 PM Magnus Pimentelo Add [R07 81] Pleuritic chest pain       ED Disposition     ED Disposition Condition Comment    Discharge  Rebekah Jdsylvia discharge to home/self care  Condition at discharge: Good        Follow-up Information     Follow up With Specialties Details Why Contact Info Additional [address] [facility] Emergency Medicine  As needed [address]  [phone]  ED, [address] [place] [address], [address] [place] [address] In 1 week  [address] [facility] [address] [place] [address]  [phone]             Discharge Medication List as of 10/10/2018  5:20 PM      CONTINUE these medications which have NOT CHANGED    Details   amLODIPine-benazepril (LOTREL) 10-20 MG per capsule TAKE ONE CAPSULE BY MOUTH EVERY DAY, Normal      citalopram (CeleXA) 20 mg tablet TAKE ONE TABLET BY MOUTH EVERY DAY, Normal           No discharge procedures on file  ED Provider  Attending physically available and evaluated Rebekah Nguyễn  I managed the patient along with the ED Attending      Electronically Signed by         Vinay Wilson MD  10/11/18 1973

## 2018-10-10 NOTE — DISCHARGE INSTRUCTIONS
Chest Pain   WHAT YOU NEED TO KNOW:   Chest pain can be caused by a range of conditions, from not serious to life-threatening  Chest pain can be a symptom of a digestive problem, such as acid reflux or a stomach ulcer  An anxiety attack or a strong emotion, such as anger, can also cause chest pain  Infection, inflammation, or a fracture in the bones or cartilage in your chest can cause pain or discomfort  Sometimes chest pain or pressure is caused by poor blood flow to your heart (angina)  Chest pain may also be caused by life-threatening conditions such as a heart attack or blood clot in your lungs  DISCHARGE INSTRUCTIONS:   Call 911 if:   · You have any of the following signs of a heart attack:      ¨ Squeezing, pressure, or pain in your chest that lasts longer than 5 minutes or returns    ¨ Discomfort or pain in your back, neck, jaw, stomach, or arm     ¨ Trouble breathing    ¨ Nausea or vomiting    ¨ Lightheadedness or a sudden cold sweat, especially with chest pain or trouble breathing    Return to the emergency department if:   · You have chest discomfort that gets worse, even with medicine  · You cough or vomit blood  · Your bowel movements are black or bloody  · You cannot stop vomiting, or it hurts to swallow  Contact your healthcare provider if:   · You have questions or concerns about your condition or care  Medicines:   · Medicines  may be given to treat the cause of your chest pain  Examples include pain medicine, anxiety medicine, or medicines to increase blood flow to your heart  · Do not take certain medicines without asking your healthcare provider first   These include NSAIDs, herbal or vitamin supplements, or hormones (estrogen or progestin)  · Take your medicine as directed  Contact your healthcare provider if you think your medicine is not helping or if you have side effects  Tell him or her if you are allergic to any medicine   Keep a list of the medicines, vitamins, and herbs you take  Include the amounts, and when and why you take them  Bring the list or the pill bottles to follow-up visits  Carry your medicine list with you in case of an emergency  Follow up with your healthcare provider within 72 hours, or as directed: You may need to return for more tests to find the cause of your chest pain  You may be referred to a specialist, such as a cardiologist or gastroenterologist  Write down your questions so you remember to ask them during your visits  Healthy living tips: The following are general healthy guidelines  If your chest pain is caused by a heart problem, your healthcare provider will give you specific guidelines to follow  · Do not smoke  Nicotine and other chemicals in cigarettes and cigars can cause lung and heart damage  Ask your healthcare provider for information if you currently smoke and need help to quit  E-cigarettes or smokeless tobacco still contain nicotine  Talk to your healthcare provider before you use these products  · Eat a variety of healthy, low-fat foods  Healthy foods include fruits, vegetables, whole-grain breads, low-fat dairy products, beans, lean meats, and fish  Ask for more information about a heart healthy diet  · Ask about activity  Your healthcare provider will tell you which activities to limit or avoid  Ask when you can drive, return to work, and have sex  Ask about the best exercise plan for you  · Maintain a healthy weight  Ask your healthcare provider how much you should weigh  Ask him or her to help you create a weight loss plan if you are overweight  · Get the flu and pneumonia vaccines  All adults should get the influenza (flu) vaccine  Get it every year as soon as it becomes available  The pneumococcal vaccine is given to adults aged 72 years or older  The vaccine is given every 5 years to prevent pneumococcal disease, such as pneumonia    © 2017 [employer] Information is for End User's use only and may not be sold, redistributed or otherwise used for commercial purposes  All illustrations and images included in CareNotes® are the copyrighted property of A D A M , Inc  or Ramiro Nielsen  The above information is an  only  It is not intended as medical advice for individual conditions or treatments  Talk to your doctor, nurse or pharmacist before following any medical regimen to see if it is safe and effective for you

## 2018-10-11 NOTE — ED ATTENDING ATTESTATION
Kerline Vela MD, saw and evaluated the patient  I have discussed the patient with the resident/non-physician practitioner and agree with the resident's/non-physician practitioner's findings, Plan of Care, and MDM as documented in the resident's/non-physician practitioner's note, except where noted  All available labs and Radiology studies were reviewed  At this point I agree with the current assessment done in the Emergency Department  I have conducted an independent evaluation of this patient a history and physical is as follows:    44 y/o M with hx HTN presents c/o chest pain  Onset of pain 5 days ago, left sided, pleuritic and only present when taking a deep breath  Not positional   No dyspnea, f/c, cough, leg pain or swelling, hx DVT or PE, abd pain, N/V  Was able to run on a treadmill a couple of days ago without any symptoms  Vitals signs reviewed  Awake and alert, NAD  Head AT/NC  PERRL, normal conjunctiva  Oropharynx clear  Neck supple  Heart RRR, no M/R/G  Lungs CTA/B  Abd soft/NT/ND  Extremities with no edema  Skin W/D/I  Nonfocal neuro  A/P: 44 y/o M with left pleuritic chest pain  EKG without ischemic changes or findings to suggest pericarditis, trop negative  Low risk by Nonnie Scarville, PERC's out  Well appearing with stable vital signs and benign exam   Recommended NSAIDs and PCP f/u  Return precautions given        Critical Care Time  CritCare Time    Procedures

## 2018-11-05 ENCOUNTER — TELEPHONE (OUTPATIENT)
Dept: FAMILY MEDICINE CLINIC | Facility: CLINIC | Age: 38
End: 2018-11-05

## 2018-11-05 DIAGNOSIS — F41.9 ANXIETY: ICD-10-CM

## 2018-11-05 RX ORDER — LORAZEPAM 0.5 MG/1
0.5 TABLET ORAL
Qty: 30 TABLET | Refills: 1 | Status: SHIPPED | OUTPATIENT
Start: 2018-11-05 | End: 2019-01-07 | Stop reason: SDUPTHER

## 2018-12-04 ENCOUNTER — OFFICE VISIT (OUTPATIENT)
Dept: FAMILY MEDICINE CLINIC | Facility: CLINIC | Age: 38
End: 2018-12-04
Payer: COMMERCIAL

## 2018-12-04 VITALS
TEMPERATURE: 97.4 F | WEIGHT: 191.2 LBS | RESPIRATION RATE: 14 BRPM | HEART RATE: 78 BPM | HEIGHT: 67 IN | BODY MASS INDEX: 30.01 KG/M2 | DIASTOLIC BLOOD PRESSURE: 70 MMHG | OXYGEN SATURATION: 98 % | SYSTOLIC BLOOD PRESSURE: 120 MMHG

## 2018-12-04 DIAGNOSIS — E55.9 VITAMIN D DEFICIENCY: ICD-10-CM

## 2018-12-04 DIAGNOSIS — E78.5 BORDERLINE HYPERLIPIDEMIA: ICD-10-CM

## 2018-12-04 DIAGNOSIS — I10 BENIGN ESSENTIAL HYPERTENSION: Primary | ICD-10-CM

## 2018-12-04 DIAGNOSIS — F41.9 ANXIETY: ICD-10-CM

## 2018-12-04 PROCEDURE — 3078F DIAST BP <80 MM HG: CPT | Performed by: FAMILY MEDICINE

## 2018-12-04 PROCEDURE — 3074F SYST BP LT 130 MM HG: CPT | Performed by: FAMILY MEDICINE

## 2018-12-04 PROCEDURE — 3008F BODY MASS INDEX DOCD: CPT | Performed by: FAMILY MEDICINE

## 2018-12-04 PROCEDURE — 99214 OFFICE O/P EST MOD 30 MIN: CPT | Performed by: FAMILY MEDICINE

## 2018-12-04 NOTE — PROGRESS NOTES
Assessment/Plan:    Problem List Items Addressed This Visit     Anxiety     Stable on citalopram         Benign essential hypertension - Primary     Stable on lotrel         Borderline hyperlipidemia     Check lipids         Vitamin D deficiency     Check labs               There are no Patient Instructions on file for this visit  No Follow-up on file  Subjective:      Patient ID: Ry Banegas is a 45 y o  male  Chief Complaint   Patient presents with    Follow-up     Patient here for 6 month follow up on Anxiety, Hypertension        Here for follow up  Gaining weight  Exercise 3 days a week  Chest pain 1 5 months ago- went to ER- work up normal      Hypertension   This is a chronic problem  The current episode started more than 1 year ago  The problem is unchanged  The problem is controlled  Associated symptoms include anxiety  There are no associated agents to hypertension  Past treatments include ACE inhibitors  The current treatment provides significant improvement  There are no compliance problems  Anxiety   Presents for follow-up visit  Symptoms include excessive worry and nervous/anxious behavior  The following portions of the patient's history were reviewed and updated as appropriate: allergies, current medications, past family history, past medical history, past social history, past surgical history and problem list     Review of Systems   Constitutional: Negative  HENT: Negative  Eyes: Negative  Respiratory: Negative  Cardiovascular: Negative  Gastrointestinal: Negative  Endocrine: Negative  Genitourinary: Negative  Musculoskeletal: Negative  Skin: Negative  Allergic/Immunologic: Negative  Neurological: Negative  Hematological: Negative  Psychiatric/Behavioral: The patient is nervous/anxious            Current Outpatient Prescriptions   Medication Sig Dispense Refill    amLODIPine-benazepril (LOTREL) 10-20 MG per capsule TAKE ONE CAPSULE BY MOUTH EVERY DAY 30 capsule 5    citalopram (CeleXA) 20 mg tablet TAKE ONE TABLET BY MOUTH EVERY DAY 30 tablet 5    LORazepam (ATIVAN) 0 5 mg tablet Take 1 tablet (0 5 mg total) by mouth daily at bedtime 30 tablet 1     No current facility-administered medications for this visit  Objective:    /70   Pulse 78   Temp (!) 97 4 °F (36 3 °C)   Resp 14   Ht 5' 7" (1 702 m)   Wt 86 7 kg (191 lb 3 2 oz)   SpO2 98%   BMI 29 95 kg/m²        Physical Exam   Constitutional: He appears well-developed and well-nourished  HENT:   Head: Normocephalic and atraumatic  Eyes: Pupils are equal, round, and reactive to light  EOM are normal    Neck: Normal range of motion  Neck supple  Cardiovascular: Normal rate, regular rhythm, normal heart sounds and intact distal pulses  Pulmonary/Chest: Effort normal and breath sounds normal    Abdominal: Soft  Bowel sounds are normal    Musculoskeletal: Normal range of motion  Neurological: He is alert  Skin: Skin is warm and dry  Psychiatric: He has a normal mood and affect  His behavior is normal  Judgment and thought content normal    Nursing note and vitals reviewed               Gertrudis Garcia, DO

## 2019-01-03 DIAGNOSIS — F41.1 GENERALIZED ANXIETY DISORDER: ICD-10-CM

## 2019-01-03 DIAGNOSIS — I10 ESSENTIAL HYPERTENSION: ICD-10-CM

## 2019-01-03 RX ORDER — AMLODIPINE BESYLATE AND BENAZEPRIL HYDROCHLORIDE 10; 20 MG/1; MG/1
CAPSULE ORAL
Qty: 30 CAPSULE | Refills: 5 | Status: SHIPPED | OUTPATIENT
Start: 2019-01-03 | End: 2019-06-30 | Stop reason: SDUPTHER

## 2019-01-03 RX ORDER — CITALOPRAM 20 MG/1
TABLET ORAL
Qty: 30 TABLET | Refills: 5 | Status: SHIPPED | OUTPATIENT
Start: 2019-01-03 | End: 2019-06-30 | Stop reason: SDUPTHER

## 2019-01-07 DIAGNOSIS — F41.9 ANXIETY: ICD-10-CM

## 2019-01-07 RX ORDER — LORAZEPAM 0.5 MG/1
0.5 TABLET ORAL
Qty: 30 TABLET | Refills: 3 | Status: SHIPPED | OUTPATIENT
Start: 2019-01-07 | End: 2019-05-08 | Stop reason: SDUPTHER

## 2019-01-07 NOTE — TELEPHONE ENCOUNTER
Refill on,     Patient is out of medication       LORazepam (ATIVAN) 0 5 mg tablet Sig: Take 1 tablet (0 5 mg total) by mouth daily at bedtime    PDMP check: last filled 12/7/18 LORAZEPAM 0 5 MG TABLET 30 0 Qty: 30       Pharmacy is [facility]  Please advise

## 2019-05-08 DIAGNOSIS — F41.9 ANXIETY: ICD-10-CM

## 2019-05-08 RX ORDER — LORAZEPAM 0.5 MG/1
0.5 TABLET ORAL
Qty: 30 TABLET | Refills: 1 | Status: SHIPPED | OUTPATIENT
Start: 2019-05-08 | End: 2019-07-11 | Stop reason: SDUPTHER

## 2019-05-08 RX ORDER — LORAZEPAM 0.5 MG/1
0.5 TABLET ORAL
Qty: 30 TABLET | Refills: 1 | Status: CANCELLED | OUTPATIENT
Start: 2019-05-08

## 2019-05-17 ENCOUNTER — TELEPHONE (OUTPATIENT)
Dept: FAMILY MEDICINE CLINIC | Facility: CLINIC | Age: 39
End: 2019-05-17

## 2019-05-20 DIAGNOSIS — E55.9 VITAMIN D DEFICIENCY: ICD-10-CM

## 2019-05-20 DIAGNOSIS — F41.9 ANXIETY: ICD-10-CM

## 2019-05-20 DIAGNOSIS — I10 BENIGN ESSENTIAL HYPERTENSION: Primary | ICD-10-CM

## 2019-05-20 DIAGNOSIS — E78.5 BORDERLINE HYPERLIPIDEMIA: ICD-10-CM

## 2019-05-31 ENCOUNTER — APPOINTMENT (OUTPATIENT)
Dept: LAB | Facility: CLINIC | Age: 39
End: 2019-05-31
Payer: COMMERCIAL

## 2019-05-31 DIAGNOSIS — E78.5 BORDERLINE HYPERLIPIDEMIA: ICD-10-CM

## 2019-05-31 DIAGNOSIS — F41.9 ANXIETY: ICD-10-CM

## 2019-05-31 DIAGNOSIS — E55.9 VITAMIN D DEFICIENCY: ICD-10-CM

## 2019-05-31 DIAGNOSIS — I10 BENIGN ESSENTIAL HYPERTENSION: ICD-10-CM

## 2019-05-31 LAB
25(OH)D3 SERPL-MCNC: 19.9 NG/ML (ref 30–100)
ALBUMIN SERPL BCP-MCNC: 3.9 G/DL (ref 3.5–5)
ALP SERPL-CCNC: 58 U/L (ref 46–116)
ALT SERPL W P-5'-P-CCNC: 29 U/L (ref 12–78)
ANION GAP SERPL CALCULATED.3IONS-SCNC: 5 MMOL/L (ref 4–13)
AST SERPL W P-5'-P-CCNC: 17 U/L (ref 5–45)
BILIRUB SERPL-MCNC: 0.63 MG/DL (ref 0.2–1)
BUN SERPL-MCNC: 18 MG/DL (ref 5–25)
CALCIUM SERPL-MCNC: 8.7 MG/DL (ref 8.3–10.1)
CHLORIDE SERPL-SCNC: 104 MMOL/L (ref 100–108)
CHOLEST SERPL-MCNC: 215 MG/DL (ref 50–200)
CO2 SERPL-SCNC: 28 MMOL/L (ref 21–32)
CREAT SERPL-MCNC: 0.98 MG/DL (ref 0.6–1.3)
ERYTHROCYTE [DISTWIDTH] IN BLOOD BY AUTOMATED COUNT: 11.9 % (ref 11.6–15.1)
GFR SERPL CREATININE-BSD FRML MDRD: 97 ML/MIN/1.73SQ M
GLUCOSE SERPL-MCNC: 90 MG/DL (ref 65–140)
HCT VFR BLD AUTO: 45.2 % (ref 36.5–49.3)
HDLC SERPL-MCNC: 42 MG/DL (ref 40–60)
HGB BLD-MCNC: 15 G/DL (ref 12–17)
LDLC SERPL CALC-MCNC: 145 MG/DL (ref 0–100)
MCH RBC QN AUTO: 29.4 PG (ref 26.8–34.3)
MCHC RBC AUTO-ENTMCNC: 33.2 G/DL (ref 31.4–37.4)
MCV RBC AUTO: 89 FL (ref 82–98)
PLATELET # BLD AUTO: 282 THOUSANDS/UL (ref 149–390)
PMV BLD AUTO: 9.6 FL (ref 8.9–12.7)
POTASSIUM SERPL-SCNC: 4.3 MMOL/L (ref 3.5–5.3)
PROT SERPL-MCNC: 7.4 G/DL (ref 6.4–8.2)
RBC # BLD AUTO: 5.1 MILLION/UL (ref 3.88–5.62)
SODIUM SERPL-SCNC: 137 MMOL/L (ref 136–145)
TRIGL SERPL-MCNC: 140 MG/DL
TSH SERPL DL<=0.05 MIU/L-ACNC: 3.59 UIU/ML (ref 0.36–3.74)
WBC # BLD AUTO: 4.76 THOUSAND/UL (ref 4.31–10.16)

## 2019-05-31 PROCEDURE — 80061 LIPID PANEL: CPT

## 2019-05-31 PROCEDURE — 82306 VITAMIN D 25 HYDROXY: CPT

## 2019-05-31 PROCEDURE — 85027 COMPLETE CBC AUTOMATED: CPT

## 2019-05-31 PROCEDURE — 80053 COMPREHEN METABOLIC PANEL: CPT

## 2019-05-31 PROCEDURE — 36415 COLL VENOUS BLD VENIPUNCTURE: CPT

## 2019-05-31 PROCEDURE — 84443 ASSAY THYROID STIM HORMONE: CPT

## 2019-06-30 DIAGNOSIS — I10 ESSENTIAL HYPERTENSION: ICD-10-CM

## 2019-06-30 DIAGNOSIS — F41.1 GENERALIZED ANXIETY DISORDER: ICD-10-CM

## 2019-06-30 RX ORDER — AMLODIPINE BESYLATE AND BENAZEPRIL HYDROCHLORIDE 10; 20 MG/1; MG/1
CAPSULE ORAL
Qty: 90 CAPSULE | Refills: 1 | Status: SHIPPED | OUTPATIENT
Start: 2019-06-30 | End: 2020-02-19

## 2019-06-30 RX ORDER — CITALOPRAM 20 MG/1
TABLET ORAL
Qty: 90 TABLET | Refills: 1 | Status: SHIPPED | OUTPATIENT
Start: 2019-06-30 | End: 2020-01-22

## 2019-07-11 DIAGNOSIS — F41.9 ANXIETY: ICD-10-CM

## 2019-07-11 RX ORDER — LORAZEPAM 0.5 MG/1
TABLET ORAL
Qty: 30 TABLET | Refills: 1 | Status: SHIPPED | OUTPATIENT
Start: 2019-07-11 | End: 2019-09-11 | Stop reason: SDUPTHER

## 2019-09-11 DIAGNOSIS — F41.9 ANXIETY: ICD-10-CM

## 2019-09-11 RX ORDER — LORAZEPAM 0.5 MG/1
0.5 TABLET ORAL
Qty: 30 TABLET | Refills: 1 | Status: SHIPPED | OUTPATIENT
Start: 2019-09-11 | End: 2019-11-14 | Stop reason: SDUPTHER

## 2019-09-11 NOTE — TELEPHONE ENCOUNTER
Medication:  Lorazepam  Dosage: 0 5mg  How Often: once daily @ bedtime  Quantity:  #30  Last Office Visit: 12/04/2019  Next Office Visit: 10/07/19  Last refilled:  07/11/19  How many pills left: 0  Pharmacy:   [id number], [place], [place]  [address] [facility] [address]  Phone: [phone] Fax: [phone]      Thank you!

## 2019-09-29 ENCOUNTER — OFFICE VISIT (OUTPATIENT)
Dept: URGENT CARE | Facility: MEDICAL CENTER | Age: 39
End: 2019-09-29
Payer: COMMERCIAL

## 2019-09-29 VITALS
RESPIRATION RATE: 18 BRPM | SYSTOLIC BLOOD PRESSURE: 118 MMHG | TEMPERATURE: 98 F | WEIGHT: 189.2 LBS | HEIGHT: 68 IN | BODY MASS INDEX: 28.67 KG/M2 | HEART RATE: 62 BPM | OXYGEN SATURATION: 98 % | DIASTOLIC BLOOD PRESSURE: 80 MMHG

## 2019-09-29 DIAGNOSIS — M62.830 MUSCLE SPASM OF BACK: Primary | ICD-10-CM

## 2019-09-29 PROCEDURE — G0382 LEV 3 HOSP TYPE B ED VISIT: HCPCS | Performed by: PHYSICIAN ASSISTANT

## 2019-09-29 RX ORDER — CYCLOBENZAPRINE HCL 10 MG
10 TABLET ORAL 3 TIMES DAILY PRN
Qty: 20 TABLET | Refills: 0 | Status: SHIPPED | OUTPATIENT
Start: 2019-09-29 | End: 2020-05-06 | Stop reason: ALTCHOICE

## 2019-09-29 NOTE — PROGRESS NOTES
[facility]        NAME: Roxane Andrews is a 44 y o  male  : 1980    MRN: [id number]  DATE: 2019  TIME: 9:13 AM    Assessment and Plan   Muscle spasm of back [M62 830]  1  Muscle spasm of back  cyclobenzaprine (FLEXERIL) 10 mg tablet         Patient Instructions     Please use ibuprofen during the day for pain   use muscle relaxers at night   use heat on back   follow-up with PCP if symptoms do not improve   report to ER if symptoms worsen      Chief Complaint     Chief Complaint   Patient presents with    Back Pain     Pt  woke with pain below his left shoulder blade  Denies trauma to the area  History of Present Illness        Patient is a 58-year-old male presents today with complaints of left upper back pain  He reports this started around 330 in the morning when he went to let the dog out  He denies any injury  No previous history of back /neck/ shoulder issues  He denies being short of breath, denies chest pain  Pain does worsen with certain movements of the left arm  No numbness or tingling down the arm  No neck pain  Review of Systems   Review of Systems   Constitutional: Negative for fever  Respiratory: Negative for shortness of breath  Cardiovascular: Negative for chest pain  Musculoskeletal: Positive for back pain and myalgias  Skin: Negative for color change and rash           Current Medications       Current Outpatient Medications:     amLODIPine-benazepril (LOTREL) 10-20 MG per capsule, TAKE ONE CAPSULE BY MOUTH EVERY DAY, Disp: 90 capsule, Rfl: 1    citalopram (CeleXA) 20 mg tablet, TAKE ONE TABLET BY MOUTH EVERY DAY, Disp: 90 tablet, Rfl: 1    LORazepam (ATIVAN) 0 5 mg tablet, Take 1 tablet (0 5 mg total) by mouth daily at bedtime, Disp: 30 tablet, Rfl: 1    cyclobenzaprine (FLEXERIL) 10 mg tablet, Take 1 tablet (10 mg total) by mouth 3 (three) times a day as needed for muscle spasms, Disp: 20 tablet, Rfl: 0    Current Allergies Allergies as of 09/29/2019 - Reviewed 09/29/2019   Allergen Reaction Noted    Ceclor [cefaclor]  10/10/2018    Pollen extract  03/07/2014            The following portions of the patient's history were reviewed and updated as appropriate: allergies, current medications, past family history, past medical history, past social history, past surgical history and problem list      Past Medical History:   Diagnosis Date    Allergic        Past Surgical History:   Procedure Laterality Date    TONSILECTOMY AND ADNOIDECTOMY         Family History   Problem Relation Age of Onset    Diabetes Father     Hypertension Father     Hypertension Brother     Breast cancer Paternal Grandmother     Other Paternal Grandfather         Acute myocardial infarction          Medications have been verified  Objective   /80   Pulse 62   Temp 98 °F (36 7 °C) (Temporal)   Resp 18   Ht 5' 8" (1 727 m)   Wt 85 8 kg (189 lb 3 2 oz)   SpO2 98%   BMI 28 77 kg/m²        Physical Exam     Physical Exam   Constitutional: He appears well-developed and well-nourished  Cardiovascular: Normal rate and regular rhythm  Pulmonary/Chest: Effort normal and breath sounds normal    Musculoskeletal: Normal range of motion  He exhibits tenderness  He exhibits no edema or deformity  TTP over upper left thoracic paraspinal area  Pain elicited with flexion of left shoulder/arm  No swelling, bruising, deformity noted   Skin: Skin is warm and dry

## 2019-09-29 NOTE — PATIENT INSTRUCTIONS
Please use ibuprofen during the day for pain   use muscle relaxers at night   use heat on back   follow-up with PCP if symptoms do not improve   report to ER if symptoms worsen    Muscle Spasm   WHAT YOU NEED TO KNOW:   A muscle spasm is a sudden contraction of any muscle or group of muscles  A muscle cramp is a painful muscle spasm  Muscle cramps commonly occur after intense exercise or during pregnancy  They may also be caused by certain medications, dehydration, low calcium or magnesium levels, or another medical condition  DISCHARGE INSTRUCTIONS:   Medicines: You may need the following:  · NSAIDs  help decrease swelling and pain or fever  This medicine is available with or without a doctor's order  NSAIDs can cause stomach bleeding or kidney problems in certain people  If you take blood thinner medicine, always ask your healthcare provider if NSAIDs are safe for you  Always read the medicine label and follow directions  · Take your medicine as directed  Contact your healthcare provider if you think your medicine is not helping or if you have side effects  Tell him of her if you are allergic to any medicine  Keep a list of the medicines, vitamins, and herbs you take  Include the amounts, and when and why you take them  Bring the list or the pill bottles to follow-up visits  Carry your medicine list with you in case of an emergency  Follow up with your healthcare provider as directed: You may need other tests or treatment  You may also be referred to a physical therapist or other specialist  Write down your questions so you remember to ask them during your visits  Self-care:   · Stretch  your muscle to help relieve the cramp  It may be helpful to keep your muscle in the stretched position until the cramp is gone  · Apply heat  to help decrease pain and muscle spasms  Apply heat on the area for 20 to 30 minutes every 2 hours for as many days as directed       · Apply ice  to help decrease swelling and pain  Ice may also help prevent tissue damage  Use an ice pack, or put crushed ice in a plastic bag  Cover it with a towel and place it on your muscle for 15 to 20 minutes every hour or as directed  · Drink more liquids  to help prevent muscle cramps caused by dehydration  Sports drinks may help replace electrolytes you lose through sweat during exercise  Ask your healthcare provider how much liquid to drink each day and which liquids are best for you  · Eat healthy foods , such as fruits, vegetables, whole grains, low-fat dairy products, and lean proteins (meat, beans, and fish)  If you are pregnant, ask your healthcare provider about foods that are high in magnesium and sodium  They may help to relieve cramps during pregnancy  · Massage your muscle  to help relieve the cramp  · Take frequent deep breaths  until the cramp feels better  Lie down while you take the deep breaths so you do not get dizzy or lightheaded  Contact your healthcare provider if:   · You have signs of dehydration, such as a headache, dark yellow urine, dry eyes or mouth, or a fast heartbeat  · You have questions or concerns about your condition or care  Return to the emergency department if:   · You have warmth, swelling, or redness in the cramping muscle  · You have frequent or unrelieved muscle cramps in several different muscles  · You have muscle cramps with numbness, tingling, and burning in your hands and feet  © 2017 [address] Information is for End User's use only and may not be sold, redistributed or otherwise used for commercial purposes  All illustrations and images included in CareNotes® are the copyrighted property of [employer]  or [employer]ami[employer]  The above information is an  only  It is not intended as medical advice for individual conditions or treatments   Talk to your doctor, nurse or pharmacist before following any medical regimen to see if it is safe and effective for you

## 2019-11-11 DIAGNOSIS — F41.9 ANXIETY: ICD-10-CM

## 2019-11-12 RX ORDER — LORAZEPAM 0.5 MG/1
0.5 TABLET ORAL
Qty: 30 TABLET | Refills: 1 | OUTPATIENT
Start: 2019-11-12

## 2019-11-14 ENCOUNTER — OFFICE VISIT (OUTPATIENT)
Dept: FAMILY MEDICINE CLINIC | Facility: CLINIC | Age: 39
End: 2019-11-14
Payer: COMMERCIAL

## 2019-11-14 VITALS
OXYGEN SATURATION: 98 % | RESPIRATION RATE: 16 BRPM | TEMPERATURE: 98 F | BODY MASS INDEX: 29.04 KG/M2 | WEIGHT: 191 LBS | DIASTOLIC BLOOD PRESSURE: 80 MMHG | HEART RATE: 69 BPM | SYSTOLIC BLOOD PRESSURE: 120 MMHG

## 2019-11-14 DIAGNOSIS — E55.9 VITAMIN D DEFICIENCY: ICD-10-CM

## 2019-11-14 DIAGNOSIS — E78.5 BORDERLINE HYPERLIPIDEMIA: ICD-10-CM

## 2019-11-14 DIAGNOSIS — I10 BENIGN ESSENTIAL HYPERTENSION: Primary | ICD-10-CM

## 2019-11-14 DIAGNOSIS — F41.9 ANXIETY: ICD-10-CM

## 2019-11-14 PROCEDURE — 99214 OFFICE O/P EST MOD 30 MIN: CPT | Performed by: FAMILY MEDICINE

## 2019-11-14 PROCEDURE — 1036F TOBACCO NON-USER: CPT | Performed by: FAMILY MEDICINE

## 2019-11-14 RX ORDER — LORAZEPAM 0.5 MG/1
0.5 TABLET ORAL
Qty: 30 TABLET | Refills: 1 | Status: SHIPPED | OUTPATIENT
Start: 2019-11-14 | End: 2020-01-14 | Stop reason: SDUPTHER

## 2019-11-14 NOTE — PROGRESS NOTES
Assessment/Plan:    1  Benign essential hypertension  Assessment & Plan:  Elevated due to stress      2  Anxiety  Assessment & Plan:  Stable on citalopram    Orders:  -     LORazepam (ATIVAN) 0 5 mg tablet; Take 1 tablet (0 5 mg total) by mouth daily at bedtime  -     TSH, 3rd generation with Free T4 reflex; Future; Expected date: 05/01/2020    3  Borderline hyperlipidemia  Assessment & Plan:  Check lipids    Orders:  -     Comprehensive metabolic panel; Future; Expected date: 05/01/2020  -     CBC; Future; Expected date: 05/01/2020  -     Lipid Panel with Direct LDL reflex; Future; Expected date: 05/01/2020  -     TSH, 3rd generation with Free T4 reflex; Future; Expected date: 05/01/2020    4  Vitamin D deficiency  -     Vitamin D 25 hydroxy; Future; Expected date: 05/01/2020      BMI Counseling: Body mass index is 29 04 kg/m²  Discussed the patient's BMI with him  The BMI is above normal  Nutrition recommendations include reducing portion sizes and 3-5 servings of fruits/vegetables daily  Exercise recommendations include exercising 3-5 times per week  BMI Counseling: Body mass index is 29 04 kg/m²  The BMI is above normal  Nutrition recommendations include encouraging healthy choices of fruits and vegetables  Exercise recommendations include vigorous physical activity 75 minutes/week  No pharmacotherapy was ordered  There are no Patient Instructions on file for this visit  No follow-ups on file  Subjective:      Patient ID: Greardo Mast is a 44 y o  male  Chief Complaint   Patient presents with    Medication Refill     patient here for medication refills        Here for follow up  Works as   High stress job  Some increase weight gain  Going to gym 3 times per week    Hypertension   This is a chronic problem  The current episode started more than 1 year ago  The problem is unchanged  The problem is controlled  Associated symptoms include anxiety   There are no associated agents to hypertension  Risk factors for coronary artery disease include dyslipidemia  Past treatments include ACE inhibitors  The current treatment provides significant improvement  There are no compliance problems  Hyperlipidemia   This is a chronic problem  The current episode started more than 1 year ago  The problem is controlled  Recent lipid tests were reviewed and are high  Current antihyperlipidemic treatment includes exercise and diet change  The current treatment provides significant improvement of lipids  There are no compliance problems  Risk factors for coronary artery disease include dyslipidemia  The following portions of the patient's history were reviewed and updated as appropriate: allergies, current medications, past family history, past medical history, past social history, past surgical history and problem list     Review of Systems   Constitutional: Negative  HENT: Negative  Eyes: Negative  Respiratory: Negative  Cardiovascular: Negative  Gastrointestinal: Negative  Endocrine: Negative  Genitourinary: Negative  Musculoskeletal: Negative  Allergic/Immunologic: Negative  Neurological: Negative  Hematological: Negative  Psychiatric/Behavioral: Negative  Current Outpatient Medications   Medication Sig Dispense Refill    amLODIPine-benazepril (LOTREL) 10-20 MG per capsule TAKE ONE CAPSULE BY MOUTH EVERY DAY 90 capsule 1    citalopram (CeleXA) 20 mg tablet TAKE ONE TABLET BY MOUTH EVERY DAY 90 tablet 1    cyclobenzaprine (FLEXERIL) 10 mg tablet Take 1 tablet (10 mg total) by mouth 3 (three) times a day as needed for muscle spasms 20 tablet 0    LORazepam (ATIVAN) 0 5 mg tablet Take 1 tablet (0 5 mg total) by mouth daily at bedtime 30 tablet 1     No current facility-administered medications for this visit          Objective:    /80   Pulse 69   Temp 98 °F (36 7 °C)   Resp 16   Wt 86 6 kg (191 lb)   SpO2 98%   BMI 29 04 kg/m²        Physical Exam   Constitutional: He appears well-developed and well-nourished  HENT:   Head: Normocephalic and atraumatic  Eyes: Pupils are equal, round, and reactive to light  EOM are normal    Neck: Normal range of motion  Neck supple  Cardiovascular: Normal rate, regular rhythm, normal heart sounds and intact distal pulses  Pulmonary/Chest: Effort normal and breath sounds normal    Abdominal: Soft  Bowel sounds are normal    Musculoskeletal: He exhibits tenderness  Neurological: He is alert  Skin: Skin is warm  Nursing note and vitals reviewed               Jimbo Frey DO

## 2020-01-14 ENCOUNTER — TELEPHONE (OUTPATIENT)
Dept: FAMILY MEDICINE CLINIC | Facility: CLINIC | Age: 40
End: 2020-01-14

## 2020-01-14 DIAGNOSIS — F41.9 ANXIETY: ICD-10-CM

## 2020-01-14 DIAGNOSIS — G47.00 INSOMNIA, UNSPECIFIED TYPE: ICD-10-CM

## 2020-01-14 RX ORDER — ZOLPIDEM TARTRATE 5 MG/1
5 TABLET ORAL
Qty: 7 TABLET | Refills: 0 | Status: SHIPPED | OUTPATIENT
Start: 2020-01-14 | End: 2020-05-06 | Stop reason: HOSPADM

## 2020-01-14 RX ORDER — LORAZEPAM 0.5 MG/1
0.5 TABLET ORAL
Qty: 30 TABLET | Refills: 1 | Status: SHIPPED | OUTPATIENT
Start: 2020-01-14 | End: 2020-03-13

## 2020-01-14 NOTE — TELEPHONE ENCOUNTER
Medication:LORazepam (ATIVAN) 0 5 mg tablet       Dosage:0 5 mg  How Often:Sig: Take 1 tablet (0 5 mg total) by mouth daily at bedtime  Quantity:  30  Last Office Visit: 11/14/2019  Next Office Visit: 5/14/2020  Last refilled: 11/14/2019  How many pills left:0  Pharmacy:   [facility]/pharmacy #[id number]- [place] - [id number] [place] [address]  Phone: [phone] Fax: [phone]

## 2020-01-14 NOTE — TELEPHONE ENCOUNTER
Patient called and is going to be flying to [facility] on 1/25/20 and gets anxiety when flying and wanted to know if Dr Wagner Like could prescribe some Duane Antoine so he can sleep on the flight over there  Please [facility]      Pharmacy is [facility] on [address]

## 2020-01-22 DIAGNOSIS — F41.1 GENERALIZED ANXIETY DISORDER: ICD-10-CM

## 2020-01-22 RX ORDER — CITALOPRAM 20 MG/1
20 TABLET ORAL DAILY
Qty: 90 TABLET | Refills: 3 | Status: SHIPPED | OUTPATIENT
Start: 2020-01-22 | End: 2021-02-04

## 2020-01-22 RX ORDER — CITALOPRAM 20 MG/1
TABLET ORAL
Qty: 90 TABLET | Refills: 0 | Status: SHIPPED | OUTPATIENT
Start: 2020-01-22 | End: 2020-01-22 | Stop reason: SDUPTHER

## 2020-02-04 ENCOUNTER — OFFICE VISIT (OUTPATIENT)
Dept: FAMILY MEDICINE CLINIC | Facility: CLINIC | Age: 40
End: 2020-02-04
Payer: COMMERCIAL

## 2020-02-04 VITALS
WEIGHT: 190.2 LBS | RESPIRATION RATE: 16 BRPM | OXYGEN SATURATION: 97 % | HEIGHT: 68 IN | DIASTOLIC BLOOD PRESSURE: 80 MMHG | BODY MASS INDEX: 28.82 KG/M2 | SYSTOLIC BLOOD PRESSURE: 124 MMHG | HEART RATE: 58 BPM | TEMPERATURE: 97.6 F

## 2020-02-04 DIAGNOSIS — J01.00 ACUTE NON-RECURRENT MAXILLARY SINUSITIS: Primary | ICD-10-CM

## 2020-02-04 DIAGNOSIS — I10 BENIGN ESSENTIAL HYPERTENSION: ICD-10-CM

## 2020-02-04 PROCEDURE — 1036F TOBACCO NON-USER: CPT | Performed by: FAMILY MEDICINE

## 2020-02-04 PROCEDURE — 3008F BODY MASS INDEX DOCD: CPT | Performed by: FAMILY MEDICINE

## 2020-02-04 PROCEDURE — 99213 OFFICE O/P EST LOW 20 MIN: CPT | Performed by: FAMILY MEDICINE

## 2020-02-04 PROCEDURE — 3079F DIAST BP 80-89 MM HG: CPT | Performed by: FAMILY MEDICINE

## 2020-02-04 PROCEDURE — 3074F SYST BP LT 130 MM HG: CPT | Performed by: FAMILY MEDICINE

## 2020-02-04 RX ORDER — AMOXICILLIN AND CLAVULANATE POTASSIUM 875; 125 MG/1; MG/1
1 TABLET, FILM COATED ORAL EVERY 12 HOURS SCHEDULED
Qty: 20 TABLET | Refills: 0 | Status: SHIPPED | OUTPATIENT
Start: 2020-02-04 | End: 2020-02-11 | Stop reason: ALTCHOICE

## 2020-02-04 NOTE — PROGRESS NOTES
Assessment/Plan:    1  Acute non-recurrent maxillary sinusitis  -     amoxicillin-clavulanate (AUGMENTIN) 875-125 mg per tablet; Take 1 tablet by mouth every 12 (twelve) hours for 10 days    2  Benign essential hypertension  Assessment & Plan:  Stable on current meds      BMI Counseling: Body mass index is 28 92 kg/m²  The BMI is above normal  Nutrition recommendations include encouraging healthy choices of fruits and vegetables  Exercise recommendations include exercising 3-5 times per week  No pharmacotherapy was ordered  There are no Patient Instructions on file for this visit  No follow-ups on file  Subjective:      Patient ID: Preston Ivan is a 44 y o  male  Chief Complaint   Patient presents with    Cold Like Symptoms       Here for sick visit  2 weeks of symptoms  Yellow nasal discharge  Not taking anything except advil      URI    This is a new problem  The current episode started 1 to 4 weeks ago  The problem has been unchanged  Associated symptoms include congestion, headaches, rhinorrhea, sinus pain and a sore throat  Pertinent negatives include no coughing, diarrhea, ear pain, nausea or vomiting  He has tried nothing for the symptoms  The treatment provided no relief  The following portions of the patient's history were reviewed and updated as appropriate:  past social history    Review of Systems   Constitutional: Positive for fatigue  Negative for chills and fever  HENT: Positive for congestion, rhinorrhea, sinus pain and sore throat  Negative for ear pain  Eyes: Negative  Respiratory: Negative for cough  Cardiovascular: Negative  Gastrointestinal: Negative for diarrhea, nausea and vomiting  Endocrine: Negative  Genitourinary: Negative  Musculoskeletal: Negative  Skin: Negative  Allergic/Immunologic: Negative  Neurological: Positive for headaches  Hematological: Negative  Psychiatric/Behavioral: Negative            Current Outpatient Medications   Medication Sig Dispense Refill    amLODIPine-benazepril (LOTREL) 10-20 MG per capsule TAKE ONE CAPSULE BY MOUTH EVERY DAY 90 capsule 1    citalopram (CeleXA) 20 mg tablet Take 1 tablet (20 mg total) by mouth daily 90 tablet 3    LORazepam (ATIVAN) 0 5 mg tablet Take 1 tablet (0 5 mg total) by mouth daily at bedtime 30 tablet 1    amoxicillin-clavulanate (AUGMENTIN) 875-125 mg per tablet Take 1 tablet by mouth every 12 (twelve) hours for 10 days 20 tablet 0    cyclobenzaprine (FLEXERIL) 10 mg tablet Take 1 tablet (10 mg total) by mouth 3 (three) times a day as needed for muscle spasms (Patient not taking: Reported on 2/4/2020) 20 tablet 0    zolpidem (AMBIEN) 5 mg tablet Take 1 tablet (5 mg total) by mouth daily at bedtime as needed for sleep (Patient not taking: Reported on 2/4/2020) 7 tablet 0     No current facility-administered medications for this visit  Objective:    /80 (BP Location: Left arm, Patient Position: Sitting, Cuff Size: Standard)   Pulse 58   Temp 97 6 °F (36 4 °C) (Tympanic)   Resp 16   Ht 5' 8" (1 727 m)   Wt 86 3 kg (190 lb 3 2 oz)   SpO2 97%   BMI 28 92 kg/m²      Physical Exam   Constitutional: He appears well-developed and well-nourished  HENT:   Head: Normocephalic and atraumatic  Right Ear: External ear normal    Left Ear: External ear normal    Nose: Nose normal    Mouth/Throat: Oropharynx is clear and moist    Eyes: Pupils are equal, round, and reactive to light  Conjunctivae and EOM are normal    Neck: Normal range of motion  Neck supple  Cardiovascular: Normal rate, regular rhythm, normal heart sounds and intact distal pulses  Pulmonary/Chest: Effort normal and breath sounds normal    Abdominal: Soft  Bowel sounds are normal    Musculoskeletal: Normal range of motion  Neurological: He is alert  Skin: Skin is warm  Nursing note and vitals reviewed            Lia Santos DO

## 2020-02-09 ENCOUNTER — OFFICE VISIT (OUTPATIENT)
Dept: URGENT CARE | Facility: MEDICAL CENTER | Age: 40
End: 2020-02-09
Payer: COMMERCIAL

## 2020-02-09 VITALS
RESPIRATION RATE: 18 BRPM | OXYGEN SATURATION: 97 % | TEMPERATURE: 98 F | HEIGHT: 67 IN | SYSTOLIC BLOOD PRESSURE: 124 MMHG | HEART RATE: 72 BPM | WEIGHT: 190 LBS | BODY MASS INDEX: 29.82 KG/M2 | DIASTOLIC BLOOD PRESSURE: 70 MMHG

## 2020-02-09 DIAGNOSIS — H66.91 RIGHT OTITIS MEDIA, UNSPECIFIED OTITIS MEDIA TYPE: Primary | ICD-10-CM

## 2020-02-09 PROCEDURE — G0382 LEV 3 HOSP TYPE B ED VISIT: HCPCS | Performed by: PHYSICIAN ASSISTANT

## 2020-02-09 RX ORDER — AZITHROMYCIN 250 MG/1
TABLET, FILM COATED ORAL
Qty: 6 TABLET | Refills: 0 | Status: SHIPPED | OUTPATIENT
Start: 2020-02-09 | End: 2020-02-11 | Stop reason: ALTCHOICE

## 2020-02-09 RX ORDER — OFLOXACIN 3 MG/ML
10 SOLUTION AURICULAR (OTIC) 2 TIMES DAILY
Qty: 10 ML | Refills: 0 | Status: SHIPPED | OUTPATIENT
Start: 2020-02-09 | End: 2020-02-16

## 2020-02-09 NOTE — PATIENT INSTRUCTIONS
Otitis media right  zithromax as directed  floxin otic solution  Follow up with PCP in 3-5 days  Proceed to  ER if symptoms worsen  Otitis Media   WHAT YOU NEED TO KNOW:   Otitis media is an ear infection  DISCHARGE INSTRUCTIONS:   Medicines:  · Ibuprofen or acetaminophen  helps decrease your pain and fever  They are available without a doctor's order  Ask your healthcare provider which medicine is right for you  Ask how much to take and how often to take it  These medicines can cause stomach bleeding if not taken correctly  Ibuprofen can cause kidney damage  Do not take ibuprofen if you have kidney disease, an ulcer, or allergies to aspirin  Acetaminophen can cause liver damage  Do not drink alcohol if you take acetaminophen  · Ear drops  help treat your ear pain  · Antibiotics  help treat a bacterial infection that caused your ear infection  · Take your medicine as directed  Contact your healthcare provider if you think your medicine is not helping or if you have side effects  Tell him or her if you are allergic to any medicine  Keep a list of the medicines, vitamins, and herbs you take  Include the amounts, and when and why you take them  Bring the list or the pill bottles to follow-up visits  Carry your medicine list with you in case of an emergency  Heat or ice:   · Heat  may be used to decrease your pain  Place a warm, moist washcloth on your ear  Apply for 15 to 20 minutes, 3 to 4 times a day    · Ice  helps decrease swelling and pain  Use an ice pack or put crushed ice in a plastic bag  Cover the ice pack with a towel and place it on your ear for 15 to 20 minutes, 3 to 4 times a day for 2 days  Prevent otitis media:   · Wash your hands often  Use soap and water  Wash your hands after you use the bathroom, change a child's diapers, or sneeze  Wash your hands before you prepare or eat food  · Stay away from people who are ill    Some germs are easily and quickly spread through contact  Return to work or school: You may return to work or school when your fever is gone  Follow up with your healthcare provider as directed:  Write down your questions so you remember to ask them during your visits  Contact your healthcare provider if:   · Your ear pain gets worse or does not go away, even after treatment  · The outside of your ear is red or swollen  · You have vomiting or diarrhea  · You have fluid coming from your ear  · You have questions or concerns about your condition or care  Return to the emergency department if:   · You have a seizure  · You have a fever and a stiff neck  © 2017 [address]  Information is for End User's use only and may not be sold, redistributed or otherwise used for commercial purposes  All illustrations and images included in CareNotes® are the copyrighted property of [employer]  or [employer]  The above information is an  only  It is not intended as medical advice for individual conditions or treatments  Talk to your doctor, nurse or pharmacist before following any medical regimen to see if it is safe and effective for you

## 2020-02-09 NOTE — PROGRESS NOTES
[facility]        NAME: Tim No is a 44 y o  male  : 1980    MRN: [id number]  DATE: 2020  TIME: 5:23 PM    Assessment and Plan   Right otitis media, unspecified otitis media type [H66 [id number]]  1  Right otitis media, unspecified otitis media type  azithromycin (ZITHROMAX) 250 mg tablet    ofloxacin (FLOXIN) 0 3 % otic solution         Patient Instructions     Otitis media right  zithromax as directed  floxin otic solution  Follow up with PCP in 3-5 days  Proceed to  ER if symptoms worsen  Chief Complaint     Chief Complaint   Patient presents with    Headache     Pt  with b/l ear pain for thr past 3 days   Earache         History of Present Illness       43 y/o male presents c/o bilateral ear pain  Patient states he was seen for sinusitis and placed on augmentin but symptoms have not improved  Denies fever, chills, SOB      Review of Systems   Review of Systems   Constitutional: Negative  HENT: Positive for ear pain  Negative for congestion, dental problem, drooling, postnasal drip, rhinorrhea, sinus pain, sore throat, trouble swallowing and voice change  Eyes: Negative  Respiratory: Negative  Negative for apnea, cough, choking, chest tightness, shortness of breath, wheezing and stridor  Cardiovascular: Negative  Negative for chest pain           Current Medications       Current Outpatient Medications:     amLODIPine-benazepril (LOTREL) 10-20 MG per capsule, TAKE ONE CAPSULE BY MOUTH EVERY DAY, Disp: 90 capsule, Rfl: 1    amoxicillin-clavulanate (AUGMENTIN) 875-125 mg per tablet, Take 1 tablet by mouth every 12 (twelve) hours for 10 days, Disp: 20 tablet, Rfl: 0    citalopram (CeleXA) 20 mg tablet, Take 1 tablet (20 mg total) by mouth daily, Disp: 90 tablet, Rfl: 3    LORazepam (ATIVAN) 0 5 mg tablet, Take 1 tablet (0 5 mg total) by mouth daily at bedtime, Disp: 30 tablet, Rfl: 1    azithromycin (ZITHROMAX) 250 mg tablet, Take 2 tablets today then 1 tablet daily x 4 days, Disp: 6 tablet, Rfl: 0    cyclobenzaprine (FLEXERIL) 10 mg tablet, Take 1 tablet (10 mg total) by mouth 3 (three) times a day as needed for muscle spasms (Patient not taking: Reported on 2/4/2020), Disp: 20 tablet, Rfl: 0    ofloxacin (FLOXIN) 0 3 % otic solution, Administer 10 drops to the right ear 2 (two) times a day for 7 days, Disp: 10 mL, Rfl: 0    zolpidem (AMBIEN) 5 mg tablet, Take 1 tablet (5 mg total) by mouth daily at bedtime as needed for sleep (Patient not taking: Reported on 2/4/2020), Disp: 7 tablet, Rfl: 0    Current Allergies     Allergies as of 02/09/2020 - Reviewed 02/09/2020   Allergen Reaction Noted    Ceclor [cefaclor]  10/10/2018    Pollen extract  03/07/2014            The following portions of the patient's history were reviewed and updated as appropriate: allergies, current medications, past family history, past medical history, past social history, past surgical history and problem list      Past Medical History:   Diagnosis Date    Allergic        Past Surgical History:   Procedure Laterality Date    TONSILECTOMY AND ADNOIDECTOMY         Family History   Problem Relation Age of Onset    Diabetes Father     Hypertension Father     Hypertension Brother     Breast cancer Paternal Grandmother     Other Paternal Grandfather         Acute myocardial infarction          Medications have been verified  Objective   /70   Pulse 72   Temp 98 °F (36 7 °C) (Temporal)   Resp 18   Ht 5' 7" (1 702 m)   Wt 86 2 kg (190 lb)   SpO2 97%   BMI 29 76 kg/m²        Physical Exam     Physical Exam   Constitutional: He appears well-developed and well-nourished  No distress  HENT:   Head: Normocephalic and atraumatic  Right Ear: Hearing, external ear and ear canal normal  Tympanic membrane is erythematous  Left Ear: Hearing, tympanic membrane, external ear and ear canal normal    Neck: Normal range of motion  Neck supple     Cardiovascular: Normal rate, regular rhythm, normal heart sounds and intact distal pulses  Pulmonary/Chest: Effort normal and breath sounds normal  No respiratory distress  He has no wheezes  He has no rales  He exhibits no tenderness  Lymphadenopathy:     He has no cervical adenopathy  Skin: He is not diaphoretic

## 2020-02-10 ENCOUNTER — TELEPHONE (OUTPATIENT)
Dept: FAMILY MEDICINE | Age: 40
End: 2020-02-10

## 2020-02-10 RX ORDER — OSELTAMIVIR PHOSPHATE 75 MG/1
75 CAPSULE ORAL 2 TIMES DAILY
Qty: 10 CAPSULE | Refills: 0 | Status: SHIPPED | OUTPATIENT
Start: 2020-02-10 | End: 2020-02-15

## 2020-02-11 ENCOUNTER — OFFICE VISIT (OUTPATIENT)
Dept: FAMILY MEDICINE CLINIC | Facility: CLINIC | Age: 40
End: 2020-02-11
Payer: COMMERCIAL

## 2020-02-11 VITALS
WEIGHT: 190 LBS | TEMPERATURE: 97.2 F | SYSTOLIC BLOOD PRESSURE: 130 MMHG | HEART RATE: 86 BPM | DIASTOLIC BLOOD PRESSURE: 60 MMHG | HEIGHT: 67 IN | OXYGEN SATURATION: 99 % | BODY MASS INDEX: 29.82 KG/M2

## 2020-02-11 DIAGNOSIS — H60.332 ACUTE SWIMMER'S EAR OF LEFT SIDE: ICD-10-CM

## 2020-02-11 DIAGNOSIS — J01.00 ACUTE NON-RECURRENT MAXILLARY SINUSITIS: Primary | ICD-10-CM

## 2020-02-11 DIAGNOSIS — H61.21 IMPACTED CERUMEN, RIGHT EAR: ICD-10-CM

## 2020-02-11 PROCEDURE — 1036F TOBACCO NON-USER: CPT | Performed by: FAMILY MEDICINE

## 2020-02-11 PROCEDURE — 69210 REMOVE IMPACTED EAR WAX UNI: CPT | Performed by: FAMILY MEDICINE

## 2020-02-11 PROCEDURE — 3078F DIAST BP <80 MM HG: CPT | Performed by: FAMILY MEDICINE

## 2020-02-11 PROCEDURE — 3075F SYST BP GE 130 - 139MM HG: CPT | Performed by: FAMILY MEDICINE

## 2020-02-11 PROCEDURE — 3008F BODY MASS INDEX DOCD: CPT | Performed by: FAMILY MEDICINE

## 2020-02-11 PROCEDURE — 99213 OFFICE O/P EST LOW 20 MIN: CPT | Performed by: FAMILY MEDICINE

## 2020-02-11 RX ORDER — PREDNISONE 10 MG/1
TABLET ORAL
Qty: 30 TABLET | Refills: 0 | Status: SHIPPED | OUTPATIENT
Start: 2020-02-11 | End: 2020-05-06 | Stop reason: ALTCHOICE

## 2020-02-11 NOTE — PROGRESS NOTES
Assessment/Plan:    1  Acute non-recurrent maxillary sinusitis  -     predniSONE 10 mg tablet; Take 4 daily for 4 days, then 3 daily for 3 days, then 2 daily for 2 days, then 1 daily for 1 days  Take with food  2  Acute swimmer's ear of left side  Assessment & Plan: Will get ear drops filled      3  Impacted cerumen, right ear  Assessment & Plan:  S/p flush  Canal cleaned    Ear cerumen removal  Date/Time: 2/11/2020 2:49 PM  Performed by: Marcial Pena DO  Authorized by: Marcial Pena DO     Consent:     Consent obtained:  Verbal    Consent given by:  Patient  Procedure details:     Local anesthetic:  None    Location:  R ear    Procedure type: irrigation with instrumentation      Instrumentation: curette    Post-procedure details:     Complication:  None    Hearing quality:  Improved    Patient tolerance of procedure: Tolerated well, no immediate complications           There are no Patient Instructions on file for this visit  No follow-ups on file  Subjective:      Patient ID: Jessica Butterfield is a 44 y o  male  Chief Complaint   Patient presents with   [facility]     Patient here with B/L ear pain fatigue nausea        Here for worsening symptoms  Both ears hurt  Still taking augmentin  Sinus symptoms improving  Ear symptoms persist      Earache    There is pain in both ears  This is a new problem  The current episode started 1 to 4 weeks ago  The problem has been unchanged  There has been no fever  The pain is at a severity of 4/10  The pain is mild  Pertinent negatives include no coughing, headaches, rhinorrhea or sore throat  He has tried antibiotics for the symptoms  The treatment provided no relief  The following portions of the patient's history were reviewed and updated as appropriate:  past social history    Review of Systems   Constitutional: Positive for fatigue  HENT: Positive for ear pain  Negative for rhinorrhea and sore throat  Eyes: Negative      Respiratory: Negative for cough  Cardiovascular: Negative  Gastrointestinal: Negative  Endocrine: Negative  Genitourinary: Negative  Musculoskeletal: Negative  Skin: Negative  Allergic/Immunologic: Negative  Neurological: Negative for headaches  Hematological: Negative  Psychiatric/Behavioral: Negative  Current Outpatient Medications   Medication Sig Dispense Refill    amLODIPine-benazepril (LOTREL) 10-20 MG per capsule TAKE ONE CAPSULE BY MOUTH EVERY DAY 90 capsule 1    citalopram (CeleXA) 20 mg tablet Take 1 tablet (20 mg total) by mouth daily 90 tablet 3    LORazepam (ATIVAN) 0 5 mg tablet Take 1 tablet (0 5 mg total) by mouth daily at bedtime 30 tablet 1    cyclobenzaprine (FLEXERIL) 10 mg tablet Take 1 tablet (10 mg total) by mouth 3 (three) times a day as needed for muscle spasms (Patient not taking: Reported on 2/4/2020) 20 tablet 0    ofloxacin (FLOXIN) 0 3 % otic solution Administer 10 drops to the right ear 2 (two) times a day for 7 days (Patient not taking: Reported on 2/11/2020) 10 mL 0    predniSONE 10 mg tablet Take 4 daily for 4 days, then 3 daily for 3 days, then 2 daily for 2 days, then 1 daily for 1 days  Take with food  30 tablet 0    zolpidem (AMBIEN) 5 mg tablet Take 1 tablet (5 mg total) by mouth daily at bedtime as needed for sleep (Patient not taking: Reported on 2/4/2020) 7 tablet 0     No current facility-administered medications for this visit  Objective:    /60   Pulse 86   Temp (!) 97 2 °F (36 2 °C)   Ht 5' 7" (1 702 m)   Wt 86 2 kg (190 lb)   SpO2 99%   BMI 29 76 kg/m²      Physical Exam   Constitutional: He appears well-developed and well-nourished  HENT:   Head: Normocephalic and atraumatic  Right Ear: Decreased hearing is noted  Left Ear: There is swelling  Tympanic membrane is injected  Eyes: Pupils are equal, round, and reactive to light  Cardiovascular: Normal rate, regular rhythm, normal heart sounds and intact distal pulses  Pulmonary/Chest: Effort normal and breath sounds normal    Abdominal: Soft  Bowel sounds are normal    Musculoskeletal: Normal range of motion  Neurological: He is alert  Skin: Skin is warm  Nursing note and vitals reviewed            Lia Olmstead DO

## 2020-02-19 DIAGNOSIS — I10 ESSENTIAL HYPERTENSION: ICD-10-CM

## 2020-02-19 RX ORDER — AMLODIPINE BESYLATE AND BENAZEPRIL HYDROCHLORIDE 10; 20 MG/1; MG/1
CAPSULE ORAL
Qty: 90 CAPSULE | Refills: 3 | Status: SHIPPED | OUTPATIENT
Start: 2020-02-19 | End: 2021-03-01

## 2020-03-13 DIAGNOSIS — F41.9 ANXIETY: ICD-10-CM

## 2020-03-13 RX ORDER — LORAZEPAM 0.5 MG/1
0.5 TABLET ORAL
Qty: 30 TABLET | Refills: 1 | Status: CANCELLED | OUTPATIENT
Start: 2020-03-13

## 2020-03-13 RX ORDER — LORAZEPAM 0.5 MG/1
TABLET ORAL
Qty: 30 TABLET | Refills: 1 | Status: SHIPPED | OUTPATIENT
Start: 2020-03-13 | End: 2020-05-11

## 2020-03-13 NOTE — TELEPHONE ENCOUNTER
Medication:Lorazepam 0 5mg  PDMP   02/12/2020  1  01/14/2020  LORAZEPAM 0 5 MG TABLET  30 0  30  TI SHE   Active agreement on file -No

## 2020-05-06 ENCOUNTER — TELEMEDICINE (OUTPATIENT)
Dept: FAMILY MEDICINE CLINIC | Facility: CLINIC | Age: 40
End: 2020-05-06
Payer: COMMERCIAL

## 2020-05-06 DIAGNOSIS — F41.9 ANXIETY: ICD-10-CM

## 2020-05-06 DIAGNOSIS — I10 BENIGN ESSENTIAL HYPERTENSION: Primary | ICD-10-CM

## 2020-05-06 DIAGNOSIS — L30.9 DERMATITIS: ICD-10-CM

## 2020-05-06 DIAGNOSIS — E78.5 BORDERLINE HYPERLIPIDEMIA: ICD-10-CM

## 2020-05-06 PROBLEM — H60.332 ACUTE SWIMMER'S EAR OF LEFT SIDE: Status: RESOLVED | Noted: 2020-02-11 | Resolved: 2020-05-06

## 2020-05-06 PROBLEM — H61.21 IMPACTED CERUMEN, RIGHT EAR: Status: RESOLVED | Noted: 2020-02-11 | Resolved: 2020-05-06

## 2020-05-06 PROBLEM — J01.00 ACUTE NON-RECURRENT MAXILLARY SINUSITIS: Status: RESOLVED | Noted: 2020-02-04 | Resolved: 2020-05-06

## 2020-05-06 PROCEDURE — 99214 OFFICE O/P EST MOD 30 MIN: CPT | Performed by: FAMILY MEDICINE

## 2020-05-06 RX ORDER — TRIAMCINOLONE ACETONIDE 1 MG/G
CREAM TOPICAL
COMMUNITY
Start: 2020-05-05 | End: 2021-04-12

## 2020-05-10 DIAGNOSIS — F41.9 ANXIETY: ICD-10-CM

## 2020-05-11 RX ORDER — LORAZEPAM 0.5 MG/1
TABLET ORAL
Qty: 30 TABLET | Refills: 1 | Status: SHIPPED | OUTPATIENT
Start: 2020-05-11 | End: 2020-07-10 | Stop reason: SDUPTHER

## 2020-07-10 DIAGNOSIS — F41.9 ANXIETY: ICD-10-CM

## 2020-07-10 NOTE — TELEPHONE ENCOUNTER
Medication:LORazepam (ATIVAN) 0 5 mg tablet       Dosage:As Directed  How Often:TAKE 1 TABLET BY MOUTH EVERYDAY AT BEDTIME  Quantity:  30  Last Office Visit: 05/06/2020  Next Office Visit: 10/14/2020  Last refilled:unk  How many pills left: 0  Pharmacy:   [id number] [place]  [address]  [address]   [facility] [address]  Phone: [phone] Fax: [phone]

## 2020-07-13 NOTE — TELEPHONE ENCOUNTER
Patient called to check the status of his medication, he has been without it for 4 days, please advise

## 2020-07-13 NOTE — TELEPHONE ENCOUNTER
Requested medication(s) are due for refill today: Yes  Patient has already received a courtesy refill: No  Other reason request has been forwarded to provider:    06/10/2020  1  05/11/2020  LORAZEPAM 0 5 MG TABLET  30 0  30  TI SHE     Last telemedicine 05/06/2020

## 2020-07-14 RX ORDER — LORAZEPAM 0.5 MG/1
0.5 TABLET ORAL
Qty: 30 TABLET | Refills: 1 | Status: SHIPPED | OUTPATIENT
Start: 2020-07-14 | End: 2020-09-11

## 2020-07-14 RX ORDER — LORAZEPAM 0.5 MG/1
TABLET ORAL
Qty: 30 TABLET | Refills: 1 | OUTPATIENT
Start: 2020-07-14

## 2020-08-20 ENCOUNTER — TELEPHONE (OUTPATIENT)
Dept: FAMILY MEDICINE CLINIC | Facility: CLINIC | Age: 40
End: 2020-08-20

## 2020-09-11 DIAGNOSIS — F41.9 ANXIETY: ICD-10-CM

## 2020-09-11 RX ORDER — LORAZEPAM 0.5 MG/1
TABLET ORAL
Qty: 30 TABLET | Refills: 1 | Status: SHIPPED | OUTPATIENT
Start: 2020-09-11 | End: 2020-11-09

## 2020-09-11 NOTE — TELEPHONE ENCOUNTER
Medication: ativan  PDMP:   08/12/2020  1  07/14/2020  LORAZEPAM 0 5 MG TABLET  30 0  30  TI SHE       Active agreement on file -No

## 2020-11-07 DIAGNOSIS — F41.9 ANXIETY: ICD-10-CM

## 2020-11-09 RX ORDER — LORAZEPAM 0.5 MG/1
TABLET ORAL
Qty: 30 TABLET | Refills: 1 | Status: SHIPPED | OUTPATIENT
Start: 2020-11-09 | End: 2021-01-11

## 2020-12-07 ENCOUNTER — OFFICE VISIT (OUTPATIENT)
Dept: FAMILY MEDICINE CLINIC | Facility: CLINIC | Age: 40
End: 2020-12-07
Payer: COMMERCIAL

## 2020-12-07 VITALS
DIASTOLIC BLOOD PRESSURE: 80 MMHG | TEMPERATURE: 97.7 F | SYSTOLIC BLOOD PRESSURE: 134 MMHG | WEIGHT: 190 LBS | BODY MASS INDEX: 28.79 KG/M2 | HEART RATE: 72 BPM | HEIGHT: 68 IN | RESPIRATION RATE: 14 BRPM | OXYGEN SATURATION: 98 %

## 2020-12-07 DIAGNOSIS — Z00.00 ANNUAL PHYSICAL EXAM: Primary | ICD-10-CM

## 2020-12-07 DIAGNOSIS — E78.5 BORDERLINE HYPERLIPIDEMIA: ICD-10-CM

## 2020-12-07 DIAGNOSIS — I10 BENIGN ESSENTIAL HYPERTENSION: ICD-10-CM

## 2020-12-07 DIAGNOSIS — E55.9 VITAMIN D DEFICIENCY: ICD-10-CM

## 2020-12-07 PROCEDURE — 99396 PREV VISIT EST AGE 40-64: CPT | Performed by: FAMILY MEDICINE

## 2020-12-07 PROCEDURE — 3075F SYST BP GE 130 - 139MM HG: CPT | Performed by: FAMILY MEDICINE

## 2020-12-07 PROCEDURE — 3079F DIAST BP 80-89 MM HG: CPT | Performed by: FAMILY MEDICINE

## 2020-12-07 PROCEDURE — 3008F BODY MASS INDEX DOCD: CPT | Performed by: FAMILY MEDICINE

## 2021-02-04 DIAGNOSIS — F41.1 GENERALIZED ANXIETY DISORDER: ICD-10-CM

## 2021-02-04 RX ORDER — CITALOPRAM 20 MG/1
TABLET ORAL
Qty: 90 TABLET | Refills: 3 | Status: SHIPPED | OUTPATIENT
Start: 2021-02-04 | End: 2022-01-21

## 2021-02-27 DIAGNOSIS — I10 ESSENTIAL HYPERTENSION: ICD-10-CM

## 2021-03-01 RX ORDER — AMLODIPINE BESYLATE AND BENAZEPRIL HYDROCHLORIDE 10; 20 MG/1; MG/1
CAPSULE ORAL
Qty: 90 CAPSULE | Refills: 3 | Status: SHIPPED | OUTPATIENT
Start: 2021-03-01 | End: 2022-02-18

## 2021-03-12 ENCOUNTER — APPOINTMENT (OUTPATIENT)
Dept: LAB | Facility: MEDICAL CENTER | Age: 41
End: 2021-03-12
Payer: COMMERCIAL

## 2021-03-12 DIAGNOSIS — E78.5 BORDERLINE HYPERLIPIDEMIA: ICD-10-CM

## 2021-03-12 DIAGNOSIS — I10 BENIGN ESSENTIAL HYPERTENSION: ICD-10-CM

## 2021-03-12 DIAGNOSIS — E55.9 VITAMIN D DEFICIENCY: ICD-10-CM

## 2021-03-12 LAB
25(OH)D3 SERPL-MCNC: 23.4 NG/ML (ref 30–100)
ALBUMIN SERPL BCP-MCNC: 4.2 G/DL (ref 3.5–5)
ALP SERPL-CCNC: 63 U/L (ref 46–116)
ALT SERPL W P-5'-P-CCNC: 37 U/L (ref 12–78)
ANION GAP SERPL CALCULATED.3IONS-SCNC: 8 MMOL/L (ref 4–13)
AST SERPL W P-5'-P-CCNC: 22 U/L (ref 5–45)
BILIRUB SERPL-MCNC: 0.89 MG/DL (ref 0.2–1)
BUN SERPL-MCNC: 17 MG/DL (ref 5–25)
CALCIUM SERPL-MCNC: 9.4 MG/DL (ref 8.3–10.1)
CHLORIDE SERPL-SCNC: 104 MMOL/L (ref 100–108)
CHOLEST SERPL-MCNC: 225 MG/DL (ref 50–200)
CO2 SERPL-SCNC: 28 MMOL/L (ref 21–32)
CREAT SERPL-MCNC: 0.98 MG/DL (ref 0.6–1.3)
ERYTHROCYTE [DISTWIDTH] IN BLOOD BY AUTOMATED COUNT: 11.8 % (ref 11.6–15.1)
GFR SERPL CREATININE-BSD FRML MDRD: 96 ML/MIN/1.73SQ M
GLUCOSE P FAST SERPL-MCNC: 85 MG/DL (ref 65–99)
HCT VFR BLD AUTO: 47.3 % (ref 36.5–49.3)
HDLC SERPL-MCNC: 41 MG/DL
HGB BLD-MCNC: 15.8 G/DL (ref 12–17)
LDLC SERPL CALC-MCNC: 151 MG/DL (ref 0–100)
MCH RBC QN AUTO: 29.4 PG (ref 26.8–34.3)
MCHC RBC AUTO-ENTMCNC: 33.4 G/DL (ref 31.4–37.4)
MCV RBC AUTO: 88 FL (ref 82–98)
PLATELET # BLD AUTO: 319 THOUSANDS/UL (ref 149–390)
PMV BLD AUTO: 9.5 FL (ref 8.9–12.7)
POTASSIUM SERPL-SCNC: 4.4 MMOL/L (ref 3.5–5.3)
PROT SERPL-MCNC: 7.5 G/DL (ref 6.4–8.2)
RBC # BLD AUTO: 5.37 MILLION/UL (ref 3.88–5.62)
SODIUM SERPL-SCNC: 140 MMOL/L (ref 136–145)
T4 FREE SERPL-MCNC: 0.88 NG/DL (ref 0.76–1.46)
TRIGL SERPL-MCNC: 165 MG/DL
TSH SERPL DL<=0.05 MIU/L-ACNC: 5.41 UIU/ML (ref 0.36–3.74)
WBC # BLD AUTO: 5.71 THOUSAND/UL (ref 4.31–10.16)

## 2021-03-12 PROCEDURE — 36415 COLL VENOUS BLD VENIPUNCTURE: CPT

## 2021-03-12 PROCEDURE — 80061 LIPID PANEL: CPT

## 2021-03-12 PROCEDURE — 85027 COMPLETE CBC AUTOMATED: CPT

## 2021-03-12 PROCEDURE — 80053 COMPREHEN METABOLIC PANEL: CPT

## 2021-03-12 PROCEDURE — 84443 ASSAY THYROID STIM HORMONE: CPT

## 2021-03-12 PROCEDURE — 84439 ASSAY OF FREE THYROXINE: CPT

## 2021-03-12 PROCEDURE — 82306 VITAMIN D 25 HYDROXY: CPT

## 2021-03-16 ENCOUNTER — OFFICE VISIT (OUTPATIENT)
Dept: FAMILY MEDICINE CLINIC | Facility: CLINIC | Age: 41
End: 2021-03-16
Payer: COMMERCIAL

## 2021-03-16 VITALS
SYSTOLIC BLOOD PRESSURE: 120 MMHG | DIASTOLIC BLOOD PRESSURE: 80 MMHG | RESPIRATION RATE: 12 BRPM | OXYGEN SATURATION: 98 % | HEIGHT: 68 IN | HEART RATE: 58 BPM | TEMPERATURE: 97.5 F | BODY MASS INDEX: 28.49 KG/M2 | WEIGHT: 188 LBS

## 2021-03-16 DIAGNOSIS — Z13.6 ENCOUNTER FOR SCREENING FOR CARDIOVASCULAR DISORDERS: ICD-10-CM

## 2021-03-16 DIAGNOSIS — I10 BENIGN ESSENTIAL HYPERTENSION: Primary | ICD-10-CM

## 2021-03-16 DIAGNOSIS — E55.9 VITAMIN D DEFICIENCY: ICD-10-CM

## 2021-03-16 DIAGNOSIS — E78.5 BORDERLINE HYPERLIPIDEMIA: ICD-10-CM

## 2021-03-16 PROBLEM — E03.8 SUBCLINICAL HYPOTHYROIDISM: Status: ACTIVE | Noted: 2021-03-16

## 2021-03-16 PROCEDURE — 3079F DIAST BP 80-89 MM HG: CPT | Performed by: FAMILY MEDICINE

## 2021-03-16 PROCEDURE — 3074F SYST BP LT 130 MM HG: CPT | Performed by: FAMILY MEDICINE

## 2021-03-16 PROCEDURE — 3725F SCREEN DEPRESSION PERFORMED: CPT | Performed by: FAMILY MEDICINE

## 2021-03-16 PROCEDURE — 99214 OFFICE O/P EST MOD 30 MIN: CPT | Performed by: FAMILY MEDICINE

## 2021-03-16 PROCEDURE — 3008F BODY MASS INDEX DOCD: CPT | Performed by: FAMILY MEDICINE

## 2021-03-16 PROCEDURE — 1036F TOBACCO NON-USER: CPT | Performed by: FAMILY MEDICINE

## 2021-03-16 RX ORDER — ERGOCALCIFEROL 1.25 MG/1
50000 CAPSULE ORAL WEEKLY
Qty: 12 CAPSULE | Refills: 3 | Status: SHIPPED | OUTPATIENT
Start: 2021-03-16 | End: 2021-10-27 | Stop reason: SDUPTHER

## 2021-03-22 ENCOUNTER — TELEPHONE (OUTPATIENT)
Dept: FAMILY MEDICINE CLINIC | Facility: CLINIC | Age: 41
End: 2021-03-22

## 2021-03-22 DIAGNOSIS — F41.9 ANXIETY: ICD-10-CM

## 2021-03-22 RX ORDER — LORAZEPAM 0.5 MG/1
0.5 TABLET ORAL
Qty: 30 TABLET | Refills: 1 | Status: SHIPPED | OUTPATIENT
Start: 2021-03-22 | End: 2021-05-18 | Stop reason: SDUPTHER

## 2021-03-22 NOTE — TELEPHONE ENCOUNTER
Pt called he would like to start a probiotic and asked if you could recommend something for him   pls advise and thank you

## 2021-03-22 NOTE — TELEPHONE ENCOUNTER
Medication: LORazepam (ATIVAN  Dosage: 0 5 mg tablet   How Often: TAKE 1 TABLET BY MOUTH EVERYDAY AT BEDTIME  Quantity:  30  Last Office Visit: 03/16/21  Next Office Visit: 09/22/21  Last refilled: 01/11/21  How many pills left: 2  Pharmacy:   [id number] [place] [place]  [address]  [address]   [place] [address]  Phone: [phone] Fax: [phone]

## 2021-03-22 NOTE — TELEPHONE ENCOUNTER
Medication:Lorazepam  PDMP   02/16/2021  1  01/11/2021  LORAZEPAM 0 5 MG TABLET  30 0  30  TI [facility]  [id number]  PEN[place] ([id number])  1        Active agreement on file -No

## 2021-03-30 ENCOUNTER — TRANSCRIBE ORDERS (OUTPATIENT)
Dept: RADIOLOGY | Facility: HOSPITAL | Age: 41
End: 2021-03-30

## 2021-03-30 ENCOUNTER — HOSPITAL ENCOUNTER (OUTPATIENT)
Dept: RADIOLOGY | Facility: HOSPITAL | Age: 41
Discharge: HOME | End: 2021-03-30
Payer: COMMERCIAL

## 2021-03-30 DIAGNOSIS — Z13.6 ENCOUNTER FOR SCREENING FOR CARDIOVASCULAR DISORDERS: ICD-10-CM

## 2021-03-30 PROCEDURE — G1004 CDSM NDSC: HCPCS

## 2021-03-30 PROCEDURE — 75571 CT HRT W/O DYE W/CA TEST: CPT

## 2021-04-12 ENCOUNTER — TELEMEDICINE (OUTPATIENT)
Dept: FAMILY MEDICINE CLINIC | Facility: CLINIC | Age: 41
End: 2021-04-12
Payer: COMMERCIAL

## 2021-04-12 DIAGNOSIS — E78.5 BORDERLINE HYPERLIPIDEMIA: Primary | ICD-10-CM

## 2021-04-12 PROCEDURE — 99213 OFFICE O/P EST LOW 20 MIN: CPT | Performed by: FAMILY MEDICINE

## 2021-04-12 PROCEDURE — 1036F TOBACCO NON-USER: CPT | Performed by: FAMILY MEDICINE

## 2021-04-12 RX ORDER — ROSUVASTATIN CALCIUM 10 MG/1
10 TABLET, COATED ORAL DAILY
Qty: 90 TABLET | Refills: 3 | Status: SHIPPED | OUTPATIENT
Start: 2021-04-12 | End: 2022-04-15

## 2021-04-12 NOTE — PROGRESS NOTES
Virtual Regular Visit      Assessment/Plan:    Problem List Items Addressed This Visit        Other    Borderline hyperlipidemia - Primary     Start crestor  Check lipids in 4 months         Relevant Medications    rosuvastatin (CRESTOR) 10 MG tablet               Reason for visit is   Chief Complaint   Patient presents with    Virtual Regular Visit        Encounter provider Raj Veloz DO    Provider located at [address] [address]      Recent Visits  No visits were found meeting these conditions  Showing recent visits within past 7 days and meeting all other requirements     Today's Visits  Date Type Provider Dept   04/12/21 Telemedicine Waldon Battles, DO [facility]   Showing today's visits and meeting all other requirements     Future Appointments  No visits were found meeting these conditions  Showing future appointments within next 150 days and meeting all other requirements        The patient was identified by name and date of birth  James Record was informed that this is a telemedicine visit and that the visit is being conducted through [facility] and patient was informed that this is a secure, HIPAA-compliant platform  He agrees to proceed     My office door was closed  No one else was in the room  He acknowledged consent and understanding of privacy and security of the video platform  The patient has agreed to participate and understands they can discontinue the visit at any time  Patient is aware this is a billable service  Subjective  James Record is a 36 y o  male ct coronary calcuim score   Here for virtual follow up  Strong family history   Coronary calcium positive in LAD      Hyperlipidemia  This is a chronic problem  The current episode started more than 1 year ago  The problem is controlled  Recent lipid tests were reviewed and are normal  There are no known factors aggravating his hyperlipidemia  Current antihyperlipidemic treatment includes diet change and exercise  The current treatment provides no improvement of lipids  There are no compliance problems  Past Medical History:   Diagnosis Date    Acute non-recurrent maxillary sinusitis 2/4/2020    Acute swimmer's ear of left side 2/11/2020    Allergic     Impacted cerumen, right ear 2/11/2020       Past Surgical History:   Procedure Laterality Date    TONSILECTOMY AND ADNOIDECTOMY         Current Outpatient Medications   Medication Sig Dispense Refill    amLODIPine-benazepril (LOTREL) 10-20 MG per capsule TAKE 1 CAPSULE BY MOUTH EVERY DAY 90 capsule 3    citalopram (CeleXA) 20 mg tablet TAKE 1 TABLET BY MOUTH EVERY DAY 90 tablet 3    ergocalciferol (VITAMIN D2) 50,000 units Take 1 capsule (50,000 Units total) by mouth once a week 12 capsule 3    LORazepam (ATIVAN) 0 5 mg tablet Take 1 tablet (0 5 mg total) by mouth daily at bedtime 30 tablet 1    rosuvastatin (CRESTOR) 10 MG tablet Take 1 tablet (10 mg total) by mouth daily 90 tablet 3     No current facility-administered medications for this visit  Allergies   Allergen Reactions    Ceclor [Cefaclor]     Pollen Extract        Review of Systems   Constitutional: Negative  HENT: Negative  Eyes: Negative  Respiratory: Negative  Cardiovascular: Negative  Gastrointestinal: Negative  Endocrine: Negative  Genitourinary: Negative  Musculoskeletal: Negative  Skin: Negative  Allergic/Immunologic: Negative  Neurological: Negative  Hematological: Negative  Psychiatric/Behavioral: The patient is nervous/anxious  Video Exam    There were no vitals filed for this visit  Physical Exam  Constitutional:       Appearance: Normal appearance  HENT:      Head: Normocephalic and atraumatic  Eyes:      Extraocular Movements: Extraocular movements intact  Pupils: Pupils are equal, round, and reactive to light     Neck:      Musculoskeletal: Normal range of motion and neck supple  Pulmonary:      Effort: Pulmonary effort is normal  No respiratory distress  Breath sounds: No wheezing  Neurological:      General: No focal deficit present  Mental Status: He is alert and oriented to person, place, and time  Psychiatric:         Mood and Affect: Mood normal          Behavior: Behavior normal          Thought Content: Thought content normal          Judgment: Judgment normal           I spent 7 minutes directly with the patient during this visit      [address] acknowledges that he has consented to an online visit or consultation  He understands that the online visit is based solely on information provided by him, and that, in the absence of a face-to-face physical evaluation by the physician, the diagnosis he receives is both limited and provisional in terms of accuracy and completeness  This is not intended to replace a full medical face-to-face evaluation by the physician  Rebekah Nguyễn understands and accepts these terms

## 2021-05-18 ENCOUNTER — TELEPHONE (OUTPATIENT)
Dept: FAMILY MEDICINE CLINIC | Facility: CLINIC | Age: 41
End: 2021-05-18

## 2021-05-18 DIAGNOSIS — F41.9 ANXIETY: ICD-10-CM

## 2021-05-18 RX ORDER — LORAZEPAM 0.5 MG/1
0.5 TABLET ORAL
Qty: 30 TABLET | Refills: 0 | Status: SHIPPED | OUTPATIENT
Start: 2021-05-18 | End: 2021-06-17 | Stop reason: SDUPTHER

## 2021-05-18 NOTE — TELEPHONE ENCOUNTER
Medication:  PDMP   Active agreement on file -No      04/20/2021  1  03/22/2021  LORAZEPAM 0 5 MG TABLET  30 0  30  TI SHE

## 2021-05-18 NOTE — TELEPHONE ENCOUNTER
Kellen Eastman  How Often:Once a day  Quantity:  27  Last Office Visit: 3/16/2021  Next Office Visit:9/22/21  Last refilled:03/22/2021  How many pills left:0  Pharmacy:   [id number] [place], [place] - [address]  [place] [id number] [facility] [address]  Phone: [phone] Fax: [phone]

## 2021-08-24 DIAGNOSIS — F41.9 ANXIETY: ICD-10-CM

## 2021-08-24 NOTE — TELEPHONE ENCOUNTER
Medication:  PDMP   07/16/2021  1  06/17/2021  LORAZEPAM 0 5 MG TABLET  30 0  30  TI SHE      Active agreement on file -No

## 2021-08-25 RX ORDER — LORAZEPAM 0.5 MG/1
0.5 TABLET ORAL
Qty: 30 TABLET | Refills: 0 | Status: SHIPPED | OUTPATIENT
Start: 2021-08-25 | End: 2021-09-23 | Stop reason: SDUPTHER

## 2021-09-22 ENCOUNTER — OFFICE VISIT (OUTPATIENT)
Dept: FAMILY MEDICINE CLINIC | Facility: CLINIC | Age: 41
End: 2021-09-22
Payer: COMMERCIAL

## 2021-09-22 VITALS
HEART RATE: 58 BPM | RESPIRATION RATE: 16 BRPM | SYSTOLIC BLOOD PRESSURE: 130 MMHG | TEMPERATURE: 97.4 F | OXYGEN SATURATION: 98 % | BODY MASS INDEX: 28.7 KG/M2 | HEIGHT: 68 IN | WEIGHT: 189.4 LBS | DIASTOLIC BLOOD PRESSURE: 80 MMHG

## 2021-09-22 DIAGNOSIS — Z23 NEED FOR VACCINATION: ICD-10-CM

## 2021-09-22 DIAGNOSIS — E78.5 BORDERLINE HYPERLIPIDEMIA: ICD-10-CM

## 2021-09-22 DIAGNOSIS — R10.84 GENERALIZED ABDOMINAL PAIN: ICD-10-CM

## 2021-09-22 DIAGNOSIS — F41.9 ANXIETY: ICD-10-CM

## 2021-09-22 DIAGNOSIS — I10 BENIGN ESSENTIAL HYPERTENSION: Primary | ICD-10-CM

## 2021-09-22 DIAGNOSIS — E03.8 SUBCLINICAL HYPOTHYROIDISM: ICD-10-CM

## 2021-09-22 PROCEDURE — 3075F SYST BP GE 130 - 139MM HG: CPT | Performed by: FAMILY MEDICINE

## 2021-09-22 PROCEDURE — 3008F BODY MASS INDEX DOCD: CPT | Performed by: FAMILY MEDICINE

## 2021-09-22 PROCEDURE — 3725F SCREEN DEPRESSION PERFORMED: CPT | Performed by: FAMILY MEDICINE

## 2021-09-22 PROCEDURE — 99214 OFFICE O/P EST MOD 30 MIN: CPT | Performed by: FAMILY MEDICINE

## 2021-09-22 PROCEDURE — 3079F DIAST BP 80-89 MM HG: CPT | Performed by: FAMILY MEDICINE

## 2021-09-22 PROCEDURE — 1036F TOBACCO NON-USER: CPT | Performed by: FAMILY MEDICINE

## 2021-09-22 PROCEDURE — 90471 IMMUNIZATION ADMIN: CPT

## 2021-09-22 PROCEDURE — 90686 IIV4 VACC NO PRSV 0.5 ML IM: CPT

## 2021-09-22 NOTE — PROGRESS NOTES
Assessment/Plan:    1  Benign essential hypertension  Assessment & Plan:  Stable on current med      2  Subclinical hypothyroidism  Assessment & Plan:  Check tsh      3  Borderline hyperlipidemia  Assessment & Plan:  Stable on crestor      4  Anxiety  Assessment & Plan:  Stable on celexa/ativan      5  Generalized abdominal pain  Assessment & Plan:  Mom has celiac  No polyp history  Poss IBD    Orders:  -     Ambulatory referral to Gastroenterology; Future    6  Need for vaccination  -     influenza vaccine, quadrivalent, 0 5 mL, preservative-free, for adult and pediatric patients 6 mos+ (AFLURIA, FLUARIX, FLULAVAL, FLUZONE)      Depression Screening and Follow-up Plan:   Patient was screened for depression during today's encounter  They screened negative with a PHQ-2 score of 0  There are no Patient Instructions on file for this visit  No follow-ups on file  Subjective:      Patient ID: Shirin Lopez is a 39 y o  male  Chief Complaint   Patient presents with    Follow-up     Patient here for 6 month follow up on Hypertension        Here for follow up  Overall feeling well  Having upset stomach issues ?anxiety  Nausea, losse stool 1-2 times week    Hypertension  This is a chronic problem  The current episode started more than 1 year ago  The problem is unchanged  The problem is controlled  Associated symptoms include anxiety  There are no associated agents to hypertension  Risk factors for coronary artery disease include dyslipidemia  Past treatments include ACE inhibitors  The current treatment provides significant improvement  There are no compliance problems  Abdominal Pain  This is a chronic problem  The current episode started more than 1 month ago  The onset quality is gradual  The problem occurs intermittently  The problem has been unchanged  The pain is located in the generalized abdominal region  Associated symptoms include diarrhea and nausea         The following portions of the patient's history were reviewed and updated as appropriate: allergies, current medications, past family history, past medical history, past social history, past surgical history and problem list     Review of Systems   Constitutional: Negative  HENT: Negative  Eyes: Negative  Respiratory: Negative  Cardiovascular: Negative  Gastrointestinal: Positive for abdominal pain, diarrhea and nausea  Endocrine: Negative  Genitourinary: Negative  Musculoskeletal: Negative  Skin: Negative  Allergic/Immunologic: Negative  Neurological: Negative  Hematological: Negative  Psychiatric/Behavioral: Negative  Current Outpatient Medications   Medication Sig Dispense Refill    amLODIPine-benazepril (LOTREL) 10-20 MG per capsule TAKE 1 CAPSULE BY MOUTH EVERY DAY 90 capsule 3    citalopram (CeleXA) 20 mg tablet TAKE 1 TABLET BY MOUTH EVERY DAY 90 tablet 3    ergocalciferol (VITAMIN D2) 50,000 units Take 1 capsule (50,000 Units total) by mouth once a week 12 capsule 3    LORazepam (ATIVAN) 0 5 mg tablet Take 1 tablet (0 5 mg total) by mouth daily at bedtime 30 tablet 0    rosuvastatin (CRESTOR) 10 MG tablet Take 1 tablet (10 mg total) by mouth daily 90 tablet 3     No current facility-administered medications for this visit  Objective:    /80   Pulse 58   Temp (!) 97 4 °F (36 3 °C)   Resp 16   Ht 5' 7 95" (1 726 m)   Wt 85 9 kg (189 lb 6 4 oz)   SpO2 98%   BMI 28 84 kg/m²        Physical Exam  Vitals and nursing note reviewed  Constitutional:       Appearance: Normal appearance  HENT:      Head: Normocephalic and atraumatic  Eyes:      Extraocular Movements: Extraocular movements intact  Conjunctiva/sclera: Conjunctivae normal       Pupils: Pupils are equal, round, and reactive to light  Cardiovascular:      Rate and Rhythm: Normal rate and regular rhythm  Pulses: Normal pulses  Heart sounds: Normal heart sounds     Pulmonary:      Effort: Pulmonary effort is normal       Breath sounds: Normal breath sounds  Abdominal:      General: Abdomen is flat  Palpations: Abdomen is soft  Musculoskeletal:         General: Normal range of motion  Cervical back: Normal range of motion and neck supple  Skin:     General: Skin is warm  Capillary Refill: Capillary refill takes less than 2 seconds  Neurological:      General: No focal deficit present  Mental Status: He is alert and oriented to person, place, and time  Psychiatric:         Mood and Affect: Mood normal          Behavior: Behavior normal          Thought Content:  Thought content normal          Judgment: Judgment normal                 Rafael Young DO

## 2021-10-01 ENCOUNTER — APPOINTMENT (OUTPATIENT)
Dept: LAB | Facility: CLINIC | Age: 41
End: 2021-10-01
Payer: COMMERCIAL

## 2021-10-01 DIAGNOSIS — I10 BENIGN ESSENTIAL HYPERTENSION: ICD-10-CM

## 2021-10-01 DIAGNOSIS — E78.5 BORDERLINE HYPERLIPIDEMIA: ICD-10-CM

## 2021-10-01 DIAGNOSIS — E55.9 VITAMIN D DEFICIENCY: ICD-10-CM

## 2021-10-01 LAB
25(OH)D3 SERPL-MCNC: 44.7 NG/ML (ref 30–100)
ALBUMIN SERPL BCP-MCNC: 4 G/DL (ref 3.5–5)
ALP SERPL-CCNC: 65 U/L (ref 46–116)
ALT SERPL W P-5'-P-CCNC: 56 U/L (ref 12–78)
ANION GAP SERPL CALCULATED.3IONS-SCNC: 7 MMOL/L (ref 4–13)
AST SERPL W P-5'-P-CCNC: 25 U/L (ref 5–45)
BILIRUB SERPL-MCNC: 0.75 MG/DL (ref 0.2–1)
BUN SERPL-MCNC: 16 MG/DL (ref 5–25)
CALCIUM SERPL-MCNC: 9.1 MG/DL (ref 8.3–10.1)
CHLORIDE SERPL-SCNC: 102 MMOL/L (ref 100–108)
CHOLEST SERPL-MCNC: 172 MG/DL (ref 50–200)
CO2 SERPL-SCNC: 30 MMOL/L (ref 21–32)
CREAT SERPL-MCNC: 1 MG/DL (ref 0.6–1.3)
GFR SERPL CREATININE-BSD FRML MDRD: 93 ML/MIN/1.73SQ M
GLUCOSE P FAST SERPL-MCNC: 94 MG/DL (ref 65–99)
HDLC SERPL-MCNC: 48 MG/DL
LDLC SERPL CALC-MCNC: 96 MG/DL (ref 0–100)
POTASSIUM SERPL-SCNC: 4.1 MMOL/L (ref 3.5–5.3)
PROT SERPL-MCNC: 7.4 G/DL (ref 6.4–8.2)
SODIUM SERPL-SCNC: 139 MMOL/L (ref 136–145)
T4 FREE SERPL-MCNC: 0.94 NG/DL (ref 0.76–1.46)
TRIGL SERPL-MCNC: 138 MG/DL
TSH SERPL DL<=0.05 MIU/L-ACNC: 6.76 UIU/ML (ref 0.36–3.74)

## 2021-10-01 PROCEDURE — 82306 VITAMIN D 25 HYDROXY: CPT

## 2021-10-01 PROCEDURE — 84443 ASSAY THYROID STIM HORMONE: CPT

## 2021-10-01 PROCEDURE — 80061 LIPID PANEL: CPT

## 2021-10-01 PROCEDURE — 80053 COMPREHEN METABOLIC PANEL: CPT

## 2021-10-01 PROCEDURE — 84439 ASSAY OF FREE THYROXINE: CPT

## 2021-10-01 PROCEDURE — 36415 COLL VENOUS BLD VENIPUNCTURE: CPT

## 2021-10-18 ENCOUNTER — OFFICE VISIT (OUTPATIENT)
Dept: GASTROENTEROLOGY | Facility: AMBULARY SURGERY CENTER | Age: 41
End: 2021-10-18
Payer: COMMERCIAL

## 2021-10-18 VITALS
SYSTOLIC BLOOD PRESSURE: 128 MMHG | DIASTOLIC BLOOD PRESSURE: 78 MMHG | WEIGHT: 191 LBS | HEIGHT: 68 IN | BODY MASS INDEX: 28.95 KG/M2

## 2021-10-18 DIAGNOSIS — Z80.0 FAMILY HISTORY OF COLON CANCER: ICD-10-CM

## 2021-10-18 DIAGNOSIS — R10.11 RIGHT UPPER QUADRANT ABDOMINAL PAIN: Primary | ICD-10-CM

## 2021-10-18 DIAGNOSIS — R19.4 CHANGE IN BOWEL HABITS: ICD-10-CM

## 2021-10-18 PROCEDURE — 3078F DIAST BP <80 MM HG: CPT | Performed by: INTERNAL MEDICINE

## 2021-10-18 PROCEDURE — 3008F BODY MASS INDEX DOCD: CPT | Performed by: INTERNAL MEDICINE

## 2021-10-18 PROCEDURE — 99204 OFFICE O/P NEW MOD 45 MIN: CPT | Performed by: INTERNAL MEDICINE

## 2021-10-18 PROCEDURE — 3074F SYST BP LT 130 MM HG: CPT | Performed by: INTERNAL MEDICINE

## 2021-10-18 PROCEDURE — 1036F TOBACCO NON-USER: CPT | Performed by: INTERNAL MEDICINE

## 2021-10-18 RX ORDER — VALACYCLOVIR HYDROCHLORIDE 1 G/1
TABLET, FILM COATED ORAL
COMMUNITY
Start: 2021-09-24 | End: 2021-12-01 | Stop reason: ALTCHOICE

## 2021-10-18 RX ORDER — SODIUM PICOSULFATE, MAGNESIUM OXIDE, AND ANHYDROUS CITRIC ACID 10; 3.5; 12 MG/160ML; G/160ML; G/160ML
1 LIQUID ORAL SEE ADMIN INSTRUCTIONS
Qty: 320 ML | Refills: 0 | Status: SHIPPED | OUTPATIENT
Start: 2021-10-18 | End: 2021-12-01 | Stop reason: ALTCHOICE

## 2021-11-08 ENCOUNTER — TELEPHONE (OUTPATIENT)
Dept: GASTROENTEROLOGY | Facility: CLINIC | Age: 41
End: 2021-11-08

## 2021-11-09 ENCOUNTER — TELEPHONE (OUTPATIENT)
Dept: GASTROENTEROLOGY | Facility: CLINIC | Age: 41
End: 2021-11-09

## 2021-11-11 ENCOUNTER — TELEPHONE (OUTPATIENT)
Dept: GASTROENTEROLOGY | Facility: AMBULARY SURGERY CENTER | Age: 41
End: 2021-11-11

## 2021-11-29 ENCOUNTER — TELEPHONE (OUTPATIENT)
Dept: GASTROENTEROLOGY | Facility: AMBULARY SURGERY CENTER | Age: 41
End: 2021-11-29

## 2021-11-30 ENCOUNTER — TELEPHONE (OUTPATIENT)
Dept: GASTROENTEROLOGY | Facility: AMBULARY SURGERY CENTER | Age: 41
End: 2021-11-30

## 2021-12-01 ENCOUNTER — ANESTHESIA EVENT (OUTPATIENT)
Dept: GASTROENTEROLOGY | Facility: AMBULARY SURGERY CENTER | Age: 41
End: 2021-12-01

## 2021-12-01 ENCOUNTER — HOSPITAL ENCOUNTER (OUTPATIENT)
Dept: GASTROENTEROLOGY | Facility: AMBULARY SURGERY CENTER | Age: 41
Setting detail: OUTPATIENT SURGERY
Discharge: HOME | End: 2021-12-01
Attending: INTERNAL MEDICINE | Admitting: INTERNAL MEDICINE
Payer: COMMERCIAL

## 2021-12-01 ENCOUNTER — ANESTHESIA (OUTPATIENT)
Dept: GASTROENTEROLOGY | Facility: AMBULARY SURGERY CENTER | Age: 41
End: 2021-12-01

## 2021-12-01 VITALS
BODY MASS INDEX: 28.49 KG/M2 | WEIGHT: 188 LBS | DIASTOLIC BLOOD PRESSURE: 57 MMHG | TEMPERATURE: 96.9 F | OXYGEN SATURATION: 99 % | RESPIRATION RATE: 16 BRPM | SYSTOLIC BLOOD PRESSURE: 106 MMHG | HEIGHT: 68 IN | HEART RATE: 68 BPM

## 2021-12-01 DIAGNOSIS — K29.00 ACUTE SUPERFICIAL GASTRITIS WITHOUT HEMORRHAGE: Primary | ICD-10-CM

## 2021-12-01 DIAGNOSIS — Z80.0 FAMILY HISTORY OF COLON CANCER: ICD-10-CM

## 2021-12-01 DIAGNOSIS — R10.11 RIGHT UPPER QUADRANT ABDOMINAL PAIN: ICD-10-CM

## 2021-12-01 DIAGNOSIS — R19.4 CHANGE IN BOWEL HABITS: ICD-10-CM

## 2021-12-01 PROCEDURE — 43239 EGD BIOPSY SINGLE/MULTIPLE: CPT | Performed by: INTERNAL MEDICINE

## 2021-12-01 PROCEDURE — 45380 COLONOSCOPY AND BIOPSY: CPT | Performed by: INTERNAL MEDICINE

## 2021-12-01 PROCEDURE — 88305 TISSUE EXAM BY PATHOLOGIST: CPT | Performed by: PATHOLOGY

## 2021-12-01 RX ORDER — FENTANYL CITRATE 50 UG/ML
INJECTION, SOLUTION INTRAMUSCULAR; INTRAVENOUS AS NEEDED
Status: DISCONTINUED | OUTPATIENT
Start: 2021-12-01 | End: 2021-12-01

## 2021-12-01 RX ORDER — PROPOFOL 10 MG/ML
INJECTION, EMULSION INTRAVENOUS AS NEEDED
Status: DISCONTINUED | OUTPATIENT
Start: 2021-12-01 | End: 2021-12-01

## 2021-12-01 RX ORDER — SODIUM CHLORIDE, SODIUM LACTATE, POTASSIUM CHLORIDE, CALCIUM CHLORIDE 600; 310; 30; 20 MG/100ML; MG/100ML; MG/100ML; MG/100ML
INJECTION, SOLUTION INTRAVENOUS CONTINUOUS PRN
Status: DISCONTINUED | OUTPATIENT
Start: 2021-12-01 | End: 2021-12-01

## 2021-12-01 RX ORDER — PANTOPRAZOLE SODIUM 40 MG/1
40 TABLET, DELAYED RELEASE ORAL DAILY
Qty: 30 TABLET | Refills: 2 | Status: SHIPPED | OUTPATIENT
Start: 2021-12-01 | End: 2022-02-23

## 2021-12-01 RX ORDER — LIDOCAINE HYDROCHLORIDE 20 MG/ML
INJECTION, SOLUTION EPIDURAL; INFILTRATION; INTRACAUDAL; PERINEURAL AS NEEDED
Status: DISCONTINUED | OUTPATIENT
Start: 2021-12-01 | End: 2021-12-01

## 2021-12-01 RX ADMIN — PROPOFOL 30 MG: 10 INJECTION, EMULSION INTRAVENOUS at 11:42

## 2021-12-01 RX ADMIN — PROPOFOL 20 MG: 10 INJECTION, EMULSION INTRAVENOUS at 11:40

## 2021-12-01 RX ADMIN — PROPOFOL 100 MG: 10 INJECTION, EMULSION INTRAVENOUS at 11:36

## 2021-12-01 RX ADMIN — FENTANYL CITRATE 25 MCG: 50 INJECTION, SOLUTION INTRAMUSCULAR; INTRAVENOUS at 11:47

## 2021-12-01 RX ADMIN — FENTANYL CITRATE 25 MCG: 50 INJECTION, SOLUTION INTRAMUSCULAR; INTRAVENOUS at 11:43

## 2021-12-01 RX ADMIN — LIDOCAINE HYDROCHLORIDE 100 MG: 20 INJECTION, SOLUTION EPIDURAL; INFILTRATION; INTRACAUDAL at 11:35

## 2021-12-01 RX ADMIN — SODIUM CHLORIDE, SODIUM LACTATE, POTASSIUM CHLORIDE, AND CALCIUM CHLORIDE: .6; .31; .03; .02 INJECTION, SOLUTION INTRAVENOUS at 11:25

## 2021-12-01 RX ADMIN — PROPOFOL 50 MG: 10 INJECTION, EMULSION INTRAVENOUS at 11:44

## 2021-12-01 RX ADMIN — FENTANYL CITRATE 50 MCG: 50 INJECTION, SOLUTION INTRAMUSCULAR; INTRAVENOUS at 11:33

## 2021-12-01 RX ADMIN — PROPOFOL 200 MG: 10 INJECTION, EMULSION INTRAVENOUS at 11:35

## 2021-12-09 ENCOUNTER — TELEPHONE (OUTPATIENT)
Dept: FAMILY MEDICINE CLINIC | Facility: CLINIC | Age: 41
End: 2021-12-09

## 2021-12-09 PROCEDURE — U0005 INFEC AGEN DETEC AMPLI PROBE: HCPCS | Performed by: FAMILY MEDICINE

## 2021-12-09 PROCEDURE — U0003 INFECTIOUS AGENT DETECTION BY NUCLEIC ACID (DNA OR RNA); SEVERE ACUTE RESPIRATORY SYNDROME CORONAVIRUS 2 (SARS-COV-2) (CORONAVIRUS DISEASE [COVID-19]), AMPLIFIED PROBE TECHNIQUE, MAKING USE OF HIGH THROUGHPUT TECHNOLOGIES AS DESCRIBED BY CMS-2020-01-R: HCPCS | Performed by: FAMILY MEDICINE

## 2021-12-16 ENCOUNTER — TELEPHONE (OUTPATIENT)
Dept: FAMILY MEDICINE CLINIC | Facility: CLINIC | Age: 41
End: 2021-12-16

## 2021-12-17 PROCEDURE — U0005 INFEC AGEN DETEC AMPLI PROBE: HCPCS | Performed by: FAMILY MEDICINE

## 2021-12-17 PROCEDURE — U0003 INFECTIOUS AGENT DETECTION BY NUCLEIC ACID (DNA OR RNA); SEVERE ACUTE RESPIRATORY SYNDROME CORONAVIRUS 2 (SARS-COV-2) (CORONAVIRUS DISEASE [COVID-19]), AMPLIFIED PROBE TECHNIQUE, MAKING USE OF HIGH THROUGHPUT TECHNOLOGIES AS DESCRIBED BY CMS-2020-01-R: HCPCS | Performed by: FAMILY MEDICINE

## 2022-01-03 ENCOUNTER — TELEPHONE (OUTPATIENT)
Dept: FAMILY MEDICINE CLINIC | Facility: CLINIC | Age: 42
End: 2022-01-03

## 2022-01-03 NOTE — TELEPHONE ENCOUNTER
Patient has had a rash on the roof of his mouth for 3 days  No to the covid screening questions   Please advise

## 2022-01-05 ENCOUNTER — OFFICE VISIT (OUTPATIENT)
Dept: FAMILY MEDICINE CLINIC | Facility: CLINIC | Age: 42
End: 2022-01-05
Payer: COMMERCIAL

## 2022-01-05 VITALS
OXYGEN SATURATION: 99 % | DIASTOLIC BLOOD PRESSURE: 74 MMHG | HEIGHT: 68 IN | WEIGHT: 192.6 LBS | BODY MASS INDEX: 29.19 KG/M2 | RESPIRATION RATE: 12 BRPM | TEMPERATURE: 98.7 F | SYSTOLIC BLOOD PRESSURE: 110 MMHG | HEART RATE: 70 BPM

## 2022-01-05 DIAGNOSIS — I10 BENIGN ESSENTIAL HYPERTENSION: ICD-10-CM

## 2022-01-05 DIAGNOSIS — K13.70 MOUTH LESION: Primary | ICD-10-CM

## 2022-01-05 PROBLEM — G89.29 CHRONIC LOW BACK PAIN: Status: RESOLVED | Noted: 2017-08-01 | Resolved: 2022-01-05

## 2022-01-05 PROBLEM — R10.11 RIGHT UPPER QUADRANT ABDOMINAL PAIN: Status: RESOLVED | Noted: 2021-09-22 | Resolved: 2022-01-05

## 2022-01-05 PROBLEM — M54.2 CHRONIC NECK PAIN: Status: RESOLVED | Noted: 2017-08-01 | Resolved: 2022-01-05

## 2022-01-05 PROBLEM — M54.50 CHRONIC LOW BACK PAIN: Status: RESOLVED | Noted: 2017-08-01 | Resolved: 2022-01-05

## 2022-01-05 PROBLEM — G89.29 CHRONIC NECK PAIN: Status: RESOLVED | Noted: 2017-08-01 | Resolved: 2022-01-05

## 2022-01-05 PROBLEM — L30.9 DERMATITIS: Status: RESOLVED | Noted: 2020-05-06 | Resolved: 2022-01-05

## 2022-01-05 PROCEDURE — 3078F DIAST BP <80 MM HG: CPT | Performed by: FAMILY MEDICINE

## 2022-01-05 PROCEDURE — 99213 OFFICE O/P EST LOW 20 MIN: CPT | Performed by: FAMILY MEDICINE

## 2022-01-05 PROCEDURE — 3074F SYST BP LT 130 MM HG: CPT | Performed by: FAMILY MEDICINE

## 2022-01-05 PROCEDURE — 3008F BODY MASS INDEX DOCD: CPT | Performed by: FAMILY MEDICINE

## 2022-01-05 PROCEDURE — 1036F TOBACCO NON-USER: CPT | Performed by: FAMILY MEDICINE

## 2022-01-05 NOTE — PROGRESS NOTES
Assessment/Plan:    1  Mouth lesion  Assessment & Plan:  Improving   Will monitor for now      2  Benign essential hypertension  Assessment & Plan:  Stable on current meds      BMI Counseling: Body mass index is 29 28 kg/m²  The BMI is above normal  Nutrition recommendations include encouraging healthy choices of fruits and vegetables  Exercise recommendations include exercising 3-5 times per week  No pharmacotherapy was ordered  Rationale for BMI follow-up plan is due to patient being overweight or obese  There are no Patient Instructions on file for this visit  No follow-ups on file  Subjective:      Patient ID: James Record is a 39 y o  male  Chief Complaint   Patient presents with    Rash     patient being seen for rash on roof of mouth x 3 days        Here for follow up  Gi- took protonix for 3 weeks  Had egd/colonoscopy  Off it for a week  Monitoring diet  Sinus cold 5 days after daughter tested for covid, pcr negatvie twice  Started augmentin for 7 day- improved  Red rash on top of mouth      Rash  This is a new problem  The current episode started in the past 7 days  Location: mouth  The rash is characterized by redness  He was exposed to nothing  Past treatments include nothing  The following portions of the patient's history were reviewed and updated as appropriate:  past social history    Review of Systems   Constitutional: Negative  HENT: Positive for mouth sores  Eyes: Negative  Respiratory: Negative  Cardiovascular: Negative  Gastrointestinal: Negative  Endocrine: Negative  Genitourinary: Negative  Musculoskeletal: Negative  Skin: Positive for rash  Allergic/Immunologic: Negative  Neurological: Negative  Hematological: Negative  Psychiatric/Behavioral: Negative            Current Outpatient Medications   Medication Sig Dispense Refill    amLODIPine-benazepril (LOTREL) 10-20 MG per capsule TAKE 1 CAPSULE BY MOUTH EVERY DAY 90 capsule 3    citalopram (CeleXA) 20 mg tablet TAKE 1 TABLET BY MOUTH EVERY DAY 90 tablet 3    ergocalciferol (VITAMIN D2) 50,000 units Take 1 capsule (50,000 Units total) by mouth once a week 12 capsule 0    LORazepam (ATIVAN) 0 5 mg tablet Take 1 tablet (0 5 mg total) by mouth daily at bedtime 30 tablet 3    rosuvastatin (CRESTOR) 10 MG tablet Take 1 tablet (10 mg total) by mouth daily 90 tablet 3    pantoprazole (PROTONIX) 40 mg tablet Take 1 tablet (40 mg total) by mouth daily (Patient not taking: Reported on 1/5/2022 ) 30 tablet 2     No current facility-administered medications for this visit  Objective:    /74 (BP Location: Left arm, Patient Position: Sitting, Cuff Size: Standard)   Pulse 70   Temp 98 7 °F (37 1 °C) (Tympanic)   Resp 12   Ht 5' 8" (1 727 m)   Wt 87 4 kg (192 lb 9 6 oz)   SpO2 99%   BMI 29 28 kg/m²      Physical Exam  Vitals and nursing note reviewed  Constitutional:       Appearance: Normal appearance  HENT:      Mouth/Throat:      Mouth: Mucous membranes are moist  No oral lesions  Pharynx: Oropharynx is clear  Neurological:      General: No focal deficit present  Mental Status: He is alert and oriented to person, place, and time  Psychiatric:         Mood and Affect: Mood normal          Behavior: Behavior normal          Thought Content:  Thought content normal          Judgment: Judgment normal              Maday Schneider DO

## 2022-01-21 DIAGNOSIS — F41.1 GENERALIZED ANXIETY DISORDER: ICD-10-CM

## 2022-01-21 RX ORDER — CITALOPRAM 20 MG/1
TABLET ORAL
Qty: 90 TABLET | Refills: 3 | Status: SHIPPED | OUTPATIENT
Start: 2022-01-21

## 2022-02-18 DIAGNOSIS — I10 ESSENTIAL HYPERTENSION: ICD-10-CM

## 2022-02-18 RX ORDER — AMLODIPINE BESYLATE AND BENAZEPRIL HYDROCHLORIDE 10; 20 MG/1; MG/1
CAPSULE ORAL
Qty: 90 CAPSULE | Refills: 3 | Status: SHIPPED | OUTPATIENT
Start: 2022-02-18

## 2022-02-23 DIAGNOSIS — K29.00 ACUTE SUPERFICIAL GASTRITIS WITHOUT HEMORRHAGE: ICD-10-CM

## 2022-02-23 RX ORDER — PANTOPRAZOLE SODIUM 40 MG/1
TABLET, DELAYED RELEASE ORAL
Qty: 90 TABLET | Refills: 0 | Status: SHIPPED | OUTPATIENT
Start: 2022-02-23 | End: 2022-03-16

## 2022-03-02 ENCOUNTER — OFFICE VISIT (OUTPATIENT)
Dept: FAMILY MEDICINE CLINIC | Facility: CLINIC | Age: 42
End: 2022-03-02
Payer: COMMERCIAL

## 2022-03-02 VITALS
TEMPERATURE: 97.3 F | BODY MASS INDEX: 28.98 KG/M2 | HEART RATE: 64 BPM | SYSTOLIC BLOOD PRESSURE: 130 MMHG | OXYGEN SATURATION: 97 % | WEIGHT: 191.2 LBS | RESPIRATION RATE: 16 BRPM | HEIGHT: 68 IN | DIASTOLIC BLOOD PRESSURE: 80 MMHG

## 2022-03-02 DIAGNOSIS — E03.8 SUBCLINICAL HYPOTHYROIDISM: ICD-10-CM

## 2022-03-02 DIAGNOSIS — K21.9 GASTROESOPHAGEAL REFLUX DISEASE WITHOUT ESOPHAGITIS: ICD-10-CM

## 2022-03-02 DIAGNOSIS — I10 BENIGN ESSENTIAL HYPERTENSION: ICD-10-CM

## 2022-03-02 DIAGNOSIS — K13.70 MOUTH LESION: Primary | ICD-10-CM

## 2022-03-02 DIAGNOSIS — E78.5 BORDERLINE HYPERLIPIDEMIA: ICD-10-CM

## 2022-03-02 PROCEDURE — 99214 OFFICE O/P EST MOD 30 MIN: CPT | Performed by: FAMILY MEDICINE

## 2022-03-02 PROCEDURE — 3725F SCREEN DEPRESSION PERFORMED: CPT | Performed by: FAMILY MEDICINE

## 2022-03-02 NOTE — PROGRESS NOTES
Assessment/Plan:    1  Mouth lesion  Assessment & Plan:  Likely mucosal inflammation from acid reflux but will refer to ENT for further reassurance    Orders:  -     Ambulatory Referral to Otolaryngology; Future    2  Benign essential hypertension  Assessment & Plan:  stable    Orders:  -     CBC; Future; Expected date: 03/02/2022  -     Comprehensive metabolic panel; Future; Expected date: 03/02/2022    3  Subclinical hypothyroidism  Assessment & Plan:  Check tsh    Orders:  -     TSH, 3rd generation with Free T4 reflex; Future; Expected date: 03/02/2022    4  Gastroesophageal reflux disease without esophagitis  Assessment & Plan:  Restart pantoprazole      5  Borderline hyperlipidemia  Assessment & Plan:  Stable crestor    Orders:  -     Lipid Panel with Direct LDL reflex; Future; Expected date: 03/02/2022      Depression Screening and Follow-up Plan: Patient was screened for depression during today's encounter  They screened negative with a PHQ-2 score of 0  There are no Patient Instructions on file for this visit  Return in about 3 months (around 6/2/2022) for Annual physical     Subjective:      Patient ID: Gordon Ledesma is a 39 y o  male  Chief Complaint   Patient presents with    Oral Swelling     Patient here with swelling on top of mouth red after he eats        Had colonoscopy and upper Gi  After procedure went on protonix  Took it for 3 month  Then stopped suddenly and roof of mouth became inflammed  When he stops med it returns  Pt concerned related to infection after scope, cancer  Reassurance given    Mouth Lesions   The current episode started more than 1 week ago  Associated symptoms include mouth sores  The following portions of the patient's history were reviewed and updated as appropriate: allergies, current medications, past family history, past medical history, past social history, past surgical history and problem list     Review of Systems   Constitutional: Negative  HENT: Positive for mouth sores  Eyes: Negative  Respiratory: Negative  Cardiovascular: Negative  Gastrointestinal: Negative  Endocrine: Negative  Genitourinary: Negative  Musculoskeletal: Negative  Skin: Negative  Neurological: Negative  Hematological: Negative  Psychiatric/Behavioral: Negative  Current Outpatient Medications   Medication Sig Dispense Refill    amLODIPine-benazepril (LOTREL) 10-20 MG per capsule TAKE 1 CAPSULE BY MOUTH EVERY DAY 90 capsule 3    citalopram (CeleXA) 20 mg tablet TAKE 1 TABLET BY MOUTH EVERY DAY 90 tablet 3    ergocalciferol (VITAMIN D2) 50,000 units Take 1 capsule (50,000 Units total) by mouth once a week 12 capsule 0    LORazepam (ATIVAN) 0 5 mg tablet Take 1 tablet (0 5 mg total) by mouth daily at bedtime 30 tablet 3    rosuvastatin (CRESTOR) 10 MG tablet Take 1 tablet (10 mg total) by mouth daily 90 tablet 3    pantoprazole (PROTONIX) 40 mg tablet TAKE 1 TABLET BY MOUTH EVERY DAY (Patient not taking: Reported on 3/2/2022) 90 tablet 0     No current facility-administered medications for this visit  Objective:    /80 (BP Location: Left arm, Patient Position: Sitting, Cuff Size: Standard)   Pulse 64   Temp (!) 97 3 °F (36 3 °C)   Resp 16   Ht 5' 8" (1 727 m)   Wt 86 7 kg (191 lb 3 2 oz)   SpO2 97%   BMI 29 07 kg/m²        Physical Exam  Vitals and nursing note reviewed  Constitutional:       Appearance: Normal appearance  HENT:      Head: Normocephalic and atraumatic  Mouth/Throat:     Eyes:      Extraocular Movements: Extraocular movements intact  Pupils: Pupils are equal, round, and reactive to light  Musculoskeletal:      Cervical back: Normal range of motion and neck supple  Skin:     Capillary Refill: Capillary refill takes less than 2 seconds  Neurological:      General: No focal deficit present  Mental Status: He is alert and oriented to person, place, and time     Psychiatric: Mood and Affect: Mood normal          Behavior: Behavior normal          Thought Content:  Thought content normal          Judgment: Judgment normal                 Jerri Hampton, DO

## 2022-03-16 ENCOUNTER — OFFICE VISIT (OUTPATIENT)
Dept: GASTROENTEROLOGY | Facility: AMBULARY SURGERY CENTER | Age: 42
End: 2022-03-16
Payer: COMMERCIAL

## 2022-03-16 VITALS
SYSTOLIC BLOOD PRESSURE: 140 MMHG | WEIGHT: 195.8 LBS | OXYGEN SATURATION: 99 % | HEIGHT: 68 IN | DIASTOLIC BLOOD PRESSURE: 66 MMHG | BODY MASS INDEX: 29.67 KG/M2 | HEART RATE: 56 BPM

## 2022-03-16 DIAGNOSIS — Z80.0 FAMILY HISTORY OF COLON CANCER: ICD-10-CM

## 2022-03-16 DIAGNOSIS — K21.9 GASTROESOPHAGEAL REFLUX DISEASE WITHOUT ESOPHAGITIS: Primary | ICD-10-CM

## 2022-03-16 PROCEDURE — 3008F BODY MASS INDEX DOCD: CPT | Performed by: PHYSICIAN ASSISTANT

## 2022-03-16 PROCEDURE — 99213 OFFICE O/P EST LOW 20 MIN: CPT | Performed by: PHYSICIAN ASSISTANT

## 2022-03-16 PROCEDURE — 1036F TOBACCO NON-USER: CPT | Performed by: PHYSICIAN ASSISTANT

## 2022-03-16 NOTE — PROGRESS NOTES
Assessment and Plan    #1  GERD: reports chronic GERD symptoms, was on Prilosec and reports having gum inflammation and erythema of the upper mouth after stopping it, never had this issue before, unclear etiology, no inflammation today  -recommend stopping Prilosec, will try a different class of medication in case this was a reaction  He also had jitters and palpitations with omeprazole  -start pepcid 40 mg daily   -can have gluten testing done, bx of small intestine normal   -call with any worsening reflux   -anti-reflux diet   -follow up in 1 year, sooner if needed    #2  Family history of colon cancer  -repeat in 2026  --------------------------------------------------------------------------------------------------------------------    Chief Complaint: f/u GERD    HPI: Grover Johnson is a 39 y o  male with a history of GERD, hypothyroidism, hypertension, and family history of colon cancer who presents today for follow up for GERD  Patient reports that after starting Prilosec for a period of time and then stopping it he started to develop erythema, tenderness in his upper hard palate and gums that would typically resolve when he restarted the Prilosec  Every time he would try to discontinue the Prilosec it would reoccur  Denies any white plaques or fungus appearing material   Reports that when he stopped the medication would also have recurrence of reflux symptoms including burning but denies any dysphagia, nausea, vomiting  He reports taking Pepcid over-the-counter which did not help him  Denies any abdominal pain, constipation, diarrhea, blood in stool, black tarry stool  Denies any unexplained weight loss  Patient had an EGD and colonoscopy last year without any signs of celiac disease, H pylori, barrette's esophagus  No polyps were removed  Recommended for repeat colonoscopy in 5 years due to family history    Patient works as a  for [employer]:   General: negative for fatigue, fever, night sweats or unexpected weight loss  Psychological: negative for anxiety or depression  Ophthalmic: negative for blurry vision or scleral icterus  ENT: negative for headaches, oral lesions, sore throat, vocal changes or dysphagia  Hematological and Lymphatic: negative for pallor or swollen lymph nodes  Respiratory: negative for cough, shortness of breath or wheezing  Cardiovascular: negative for chest pain, edema or murmur  Gastrointestinal: as mentioned in HPI  Genito-Urinary: negative for dysuria or incontinence  Musculoskeletal: negative for joint pain, joint stiffness or joint swelling  Dermatological: negative for pruritus, rash, or jaundice    Current Medications  Current Outpatient Medications   Medication Sig Dispense Refill    amLODIPine-benazepril (LOTREL) 10-20 MG per capsule TAKE 1 CAPSULE BY MOUTH EVERY DAY 90 capsule 3    citalopram (CeleXA) 20 mg tablet TAKE 1 TABLET BY MOUTH EVERY DAY 90 tablet 3    ergocalciferol (VITAMIN D2) 50,000 units Take 1 capsule (50,000 Units total) by mouth once a week 12 capsule 0    LORazepam (ATIVAN) 0 5 mg tablet Take 1 tablet (0 5 mg total) by mouth daily at bedtime 30 tablet 3    rosuvastatin (CRESTOR) 10 MG tablet Take 1 tablet (10 mg total) by mouth daily 90 tablet 3    famotidine (PEPCID) 40 MG tablet Take 1 tablet (40 mg total) by mouth daily 90 tablet 1     No current facility-administered medications for this visit         Past Medical History  Past Medical History:   Diagnosis Date    Acute non-recurrent maxillary sinusitis 2/4/2020    Acute swimmer's ear of left side 2/11/2020    Allergic     Anxiety     GERD (gastroesophageal reflux disease)     Hyperlipidemia     Hypertension     Impacted cerumen, right ear 2/11/2020       Past Surgical History  Past Surgical History:   Procedure Laterality Date    TONSILECTOMY AND ADNOIDECTOMY      TONSILLECTOMY      UPPER GASTROINTESTINAL ENDOSCOPY         Past Social History   Social History     Socioeconomic History    Marital status: /Civil Union     Spouse name: None    Number of children: None    Years of education: None    Highest education level: None   Occupational History    None   Tobacco Use    Smoking status: Never Smoker    Smokeless tobacco: Never Used   Vaping Use    Vaping Use: Never used   Substance and Sexual Activity    Alcohol use: Yes     Comment: 1 per week    Drug use: No    Sexual activity: Yes   Other Topics Concern    None   Social History Narrative    Daily coffee consumption (2 cups/day)     Social Determinants of Health     Financial Resource Strain: Not on file   Food Insecurity: Not on file   Transportation Needs: Not on file   Physical Activity: Not on file   Stress: Not on file   Social Connections: Not on file   Intimate Partner Violence: Not on file   Housing Stability: Not on file       The following portions of the patient's history were reviewed and updated as appropriate: allergies, current medications, past family history, past medical history, past social history, past surgical history and problem list     Vital Signs  Vitals:    03/16/22 1131   BP: 140/66   Pulse: 56   SpO2: 99%   Weight: 88 8 kg (195 lb 12 8 oz)   Height: 5' 8" (1 727 m)       Physical Exam:  General appearance: alert, cooperative, no distress  HEENT: normocephalic, anicteric, no eye erythema or discharge, no oropharyngeal thrush  Neck: supple, trachea midline, no adenopathy  Lungs: CTA b/l, no rales, rhonchi, or wheezing, unlabored respirations  Heart: RRR, no murmur, rubs, or gallops  Abdomen: soft, non-tender, non-distended, normal bowel sounds, no masses or organomegaly  Rectal: deferred  Extremities: no cyanosis, clubbing, or edema  Musculoskeletal: normal gait  Skin: color and texture normal, no jaundice, no rashes or lesions  Psychiatric: alert and oriented, normal affect and behavior

## 2022-03-23 PROBLEM — Z91.018 FOOD ALLERGY: Status: ACTIVE | Noted: 2022-03-23

## 2022-03-29 PROBLEM — J30.89 ALLERGIC RHINITIS DUE TO DUST MITE: Status: ACTIVE | Noted: 2022-03-29

## 2022-03-29 PROBLEM — J30.1 CHRONIC SEASONAL ALLERGIC RHINITIS DUE TO POLLEN: Status: ACTIVE | Noted: 2022-03-29

## 2022-03-29 PROBLEM — J34.89 RHINORRHEA: Status: ACTIVE | Noted: 2022-03-29

## 2022-03-29 PROBLEM — H10.13 ALLERGIC CONJUNCTIVITIS, BILATERAL: Status: ACTIVE | Noted: 2022-03-29

## 2022-03-29 PROBLEM — R09.81 NASAL CONGESTION: Status: ACTIVE | Noted: 2022-03-29

## 2022-04-08 ENCOUNTER — NURSE TRIAGE (OUTPATIENT)
Dept: OTHER | Facility: OTHER | Age: 42
End: 2022-04-08

## 2022-04-08 ENCOUNTER — TELEMEDICINE (OUTPATIENT)
Dept: FAMILY MEDICINE CLINIC | Facility: CLINIC | Age: 42
End: 2022-04-08
Payer: COMMERCIAL

## 2022-04-08 VITALS — BODY MASS INDEX: 29.65 KG/M2 | TEMPERATURE: 100 F | HEIGHT: 68 IN | OXYGEN SATURATION: 99 %

## 2022-04-08 DIAGNOSIS — U07.1 COVID-19: Primary | ICD-10-CM

## 2022-04-08 PROCEDURE — 99213 OFFICE O/P EST LOW 20 MIN: CPT | Performed by: FAMILY MEDICINE

## 2022-04-08 NOTE — PROGRESS NOTES
COVID-19 Outpatient Progress Note    Assessment/Plan:    Problem List Items Addressed This Visit     None      Visit Diagnoses     COVID-19    -  Primary    Relevant Medications    nirmatrelvir & ritonavir (Paxlovid) tablet therapy pack         Disposition:     Patient is fully vaccinated and has received their booster shot  According to CDC guidelines, quarantine is not required after close contact exposure and they were advised to wear a mask for 10 days after the exposure  If patient were to develop symptoms, they should immediately self isolate and call our office for further guidance  Discussed symptom directed medication options with patient  Discussed vitamin D, vitamin C, and/or zinc supplementation with patient  Patient meets criteria for PAXLOVID and they have been counseled appropriately according to EUA documentation released by the FDA  After discussion, patient agrees to treatment  Rafy Sands is an investigational medicine used to treat mild-to-moderate COVID-19 in adults and children (15years of age and older weighing at least 80 pounds (40 kg)) with positive results of direct SARS-CoV-2 viral testing, and who are at high risk for progression to severe COVID-19, including hospitalization or death  PAXLOVID is investigational because it is still being studied  There is limited information about the safety and effectiveness of using PAXLOVID to treat people with mild-to-moderate COVID-19  The FDA has authorized the emergency use of PAXLOVID for the treatment of mild-tomoderate COVID-19 in adults and children (15years of age and older weighing at least 80 pounds (40 kg)) with a positive test for the virus that causes COVID-19, and who are at high risk for progression to severe COVID-19, including hospitalization or death, under an EUA  What should I tell my healthcare provider before I take PAXLOVID?     Tell your healthcare provider if you:  - Have any allergies  - Have liver or kidney disease  - Are pregnant or plan to become pregnant  - Are breastfeeding a child  - Have any serious illnesses    Tell your healthcare provider about all the medicines you take, including prescription and over-the-counter medicines, vitamins, and herbal supplements  Some medicines may interact with PAXLOVID and may cause serious side effects  Keep a list of your medicines to show your healthcare provider and pharmacist when you get a new medicine  You can ask your healthcare provider or pharmacist for a list of medicines that interact with PAXLOVID  Do not start taking a new medicine without telling your healthcare provider  Your healthcare provider can tell you if it is safe to take PAXLOVID with other medicines  Tell your healthcare provider if you are taking combined hormonal contraceptive  PAXLOVID may affect how your birth control pills work  Females who are able to become pregnant should use another effective alternative form of contraception or an additional barrier method of contraception  Talk to your healthcare provider if you have any questions about contraceptive methods that might be right for you  How do I take PAXLOVID? PAXLOVID consists of 2 medicines: nirmatrelvir and ritonavir  - Take 2 pink tablets of nirmatrelvir with 1 white tablet of ritonavir by mouth 2 times each day (in the morning and in the evening) for 5 days  For each dose, take all 3 tablets at the same time  - If you have kidney disease, talk to your healthcare provider  You may need a different dose  - Swallow the tablets whole  Do not chew, break, or crush the tablets  - Take PAXLOVID with or without food  - Do not stop taking PAXLOVID without talking to your healthcare provider, even if you feel better  - If you miss a dose of PAXLOVID within 8 hours of the time it is usually taken, take it as soon as you remember   If you miss a dose by more than 8 hours, skip the missed dose and take the next dose at your regular time  Do not take 2 doses of PAXLOVID at the same time  - If you take too much PAXLOVID, call your healthcare provider or go to the nearest hospital emergency room right away  - If you are taking a ritonavir- or cobicistat-containing medicine to treat hepatitis C or Human Immunodeficiency Virus (HIV), you should continue to take your medicine as prescribed by your healthcare provider   - Talk to your healthcare provider if you do not feel better or if you feel worse after 5 days  Who should generally not take PAXLOVID? Do not take PAXLOVID if:  You are allergic to nirmatrelvir, ritonavir, or any of the ingredients in PAXLOVID  You are taking any of the following medicines:  - Alfuzosin  - Pethidine, piroxicam, propoxyphene  - Ranolazine  - Amiodarone, dronedarone, flecainide, propafenone, quinidine  - Colchicine  - Lurasidone, pimozide, clozapine  - Dihydroergotamine, ergotamine, methylergonovine  - Lovastatin, simvastatin  - Sildenafil (Revatio®) for pulmonary arterial hypertension (PAH)  - Triazolam, oral midazolam  - Apalutamide  - Carbamazepine, phenobarbital, phenytoin  - Rifampin  - St  Bernards Wort (hypericum perforatum)    What are the important possible side effects of PAXLOVID? Possible side effects of PAXLOVID are:  - Liver Problems  Tell your healthcare provider right away if you have any of these signs and symptoms of liver problems: loss of appetite, yellowing of your skin and the whites of eyes (jaundice), dark-colored urine, pale colored stools and itchy skin, stomach area (abdominal) pain  - Resistance to HIV Medicines  If you have untreated HIV infection, PAXLOVID may lead to some HIV medicines not working as well in the future  - Other possible side effects include: altered sense of taste, diarrhea, high blood pressure, or muscle aches    These are not all the possible side effects of PAXLOVID  Not many people have taken PAXLOVID  Serious and unexpected side effects may happen  Jeromy Toro is still being studied, so it is possible that all of the risks are not known at this time  What other treatment choices are there? Like [place] may allow for the emergency use of other medicines to treat people with COVID-19  Go to https://Igea/ for information on the emergency use of other medicines that are authorized by FDA to treat people with COVID-19  Your healthcare provider may talk with you about clinical trials for which you may be eligible  It is your choice to be treated or not to be treated with PAXLOVID  Should you decide not to receive it or for your child not to receive it, it will not change your standard medical care  What if I am pregnant or breastfeeding? There is no experience treating pregnant women or breastfeeding mothers with PAXLOVID  For a mother and unborn baby, the benefit of taking PAXLOVID may be greater than the risk from the treatment  If you are pregnant, discuss your options and specific situation with your healthcare provider  It is recommended that you use effective barrier contraception or do not have sexual activity while taking PAXLOVID  If you are breastfeeding, discuss your options and specific situation with your healthcare provider  How do I report side effects with PAXLOVID? Contact your healthcare provider if you have any side effects that bother you or do not go away  Report side effects to FDA MedWatch at www fda gov/medwatch or call 2-906-RJC2470 or you can report side effects to [facility]  at the contact information provided below  Website Fax number Telephone number   [place] [phone]     How should I store Jeromy Toro? Store PAXLOVID tablets at room temperature between 68°F to 77°F (20°C to 25°C)      Full fact sheet for patients, parents, and caregivers can be found at: [employer]    I have spent 18 minutes directly with the patient  Greater than 50% of this time was spent in counseling/coordination of care regarding: risks and benefits of treatment options, instructions for management, risk factor reductions and impressions  Day 3 of symptoms and has had mild since last week  Fully vaccinated and booster  T-max 100° and spo2 98 %  Interested in antiviral therapy  Reviewed treatment options  Opted to do paxlovid  Patient is take twice a day for 5 days  Information provided to the patient regarding drug interaction and side effects  Recommend holding to Crestor while on medication and 1 week following treatment  Follow-up in 3 days  ER precautions     Encounter provider Fabiola Alaniz MD    Provider located at [address] [address]    Recent Visits  No visits were found meeting these conditions  Showing recent visits within past 7 days and meeting all other requirements  Today's Visits  Date Type Provider Dept   04/08/22 Telemedicine Fabiola Alaniz MD [facility] today's visits and meeting all other requirements  Future Appointments  No visits were found meeting these conditions  Showing future appointments within next 150 days and meeting all other requirements     This virtual check-in was done via [facility] and patient was informed that this is a secure, HIPAA-compliant platform  He agrees to proceed  Patient agrees to participate in a virtual check in via telephone or video visit instead of presenting to the office to address urgent/immediate medical needs  Patient is aware this is a billable service  After connecting through [facility], the patient was identified by name and date of birth  Cecilia Pen was informed that this was a telemedicine visit and that the exam was being conducted confidentially over secure lines   My office door was closed  The patient was notified the following individuals were present in the room: [facility]   Panda Prather acknowledged consent and understanding of privacy and security of the telemedicine visit  I informed the patient that I have reviewed his record in Epic and presented the opportunity for him to ask any questions regarding the visit today  The patient agreed to participate  Verification of patient location:  Patient is located in the following state in which I hold an active license: [place]    Subjective:   Panda Prather is a 39 y o  male who is concerned about COVID-19  Patient's symptoms include fever (100), fatigue, malaise, nasal congestion and cough (dry )  Patient denies chills, sore throat, anosmia, loss of taste, shortness of breath, chest tightness, abdominal pain, nausea, vomiting, diarrhea, myalgias and headaches (resolved )  - Date of symptom onset: 4/5/2022      COVID-19 vaccination status: Fully vaccinated with booster    Last weekend started to have a headache, sinus congestion, and fatigue   Acute symptoms started 2-3 days ago  [address] [facility] [place] test on Saturday was negative  Repeat last night was positive  Fully vaccinated with booster  States he feels unwell  Denies shortness of breath, chest tightness, or hypoxia  Spo2 98% and T-max 100°  Denies history of heart and lung disease    Hypertension well controlled  Taking tyelnol cold and flu  Interested in antiviral therapy    Lab Results   Component Value Date    SARSCOV2 Negative 12/17/2021    SARSCORONAVI NOT DETECTED 09/18/2020     Past Medical History:   Diagnosis Date    Acute non-recurrent maxillary sinusitis 2/4/2020    Acute swimmer's ear of left side 2/11/2020    Allergic     Anxiety     GERD (gastroesophageal reflux disease)     Hyperlipidemia     Hypertension     Impacted cerumen, right ear 2/11/2020     Past Surgical History:   Procedure Laterality Date    TONSILECTOMY AND ADNOIDECTOMY      TONSILLECTOMY      UPPER GASTROINTESTINAL ENDOSCOPY       Current Outpatient Medications   Medication Sig Dispense Refill    amLODIPine-benazepril (LOTREL) 10-20 MG per capsule TAKE 1 CAPSULE BY MOUTH EVERY DAY 90 capsule 3    citalopram (CeleXA) 20 mg tablet TAKE 1 TABLET BY MOUTH EVERY DAY 90 tablet 3    ergocalciferol (VITAMIN D2) 50,000 units Take 1 capsule (50,000 Units total) by mouth once a week 12 capsule 0    famotidine (PEPCID) 40 MG tablet Take 1 tablet (40 mg total) by mouth daily 90 tablet 1    LORazepam (ATIVAN) 0 5 mg tablet Take 1 tablet (0 5 mg total) by mouth daily at bedtime 30 tablet 5    nirmatrelvir & ritonavir (Paxlovid) tablet therapy pack Take 3 tablets by mouth 2 (two) times a day for 5 days Take 2 nirmatrelvir tablets + 1 ritonavir tablet together per dose 30 tablet 0    rosuvastatin (CRESTOR) 10 MG tablet Take 1 tablet (10 mg total) by mouth daily 90 tablet 3     No current facility-administered medications for this visit  Allergies   Allergen Reactions    Ceclor [Cefaclor]     Pollen Extract        Review of Systems   Constitutional: Positive for fatigue and fever (100)  Negative for chills  HENT: Positive for congestion  Negative for sore throat  Respiratory: Positive for cough (dry )  Negative for chest tightness and shortness of breath  Gastrointestinal: Negative for abdominal pain, diarrhea, nausea and vomiting  Musculoskeletal: Negative for myalgias  Neurological: Negative for headaches (resolved )  Objective:    Vitals:    04/08/22 1438   Temp: 100 °F (37 8 °C)   SpO2: 99%   Height: 5' 8" (1 727 m)       Physical Exam  Vitals reviewed  Constitutional:       General: He is not in acute distress  Appearance: Normal appearance  He is ill-appearing (Mildly)  HENT:      Head: Normocephalic and atraumatic  Nose: Congestion present  Eyes:      Extraocular Movements: Extraocular movements intact     Pulmonary:      Effort: Pulmonary effort is normal    Skin:     Coloration: Skin is not pale  Neurological:      Mental Status: He is alert and oriented to person, place, and time  Psychiatric:         Attention and Perception: Attention normal          Mood and Affect: Mood is anxious  Speech: Speech normal          VIRTUAL VISIT DISCLAIMER    Macy Ronnell verbally agrees to participate in [employer]  Pt is aware that [employer] could be limited without vital signs or the ability to perform a full hands-on physical [address] understands he or the provider may request at any time to terminate the video visit and request the patient to seek care or treatment in person

## 2022-04-08 NOTE — TELEPHONE ENCOUNTER
Patient calling in symptomatic and Covid positive  Home care advice given per protocol for symptom management  Patient would like a virtual visit to be seen today for his symptoms and see if he is a candidate for other Covid treatments  V V scheduled for patient today at 2:00pm        Reason for Disposition   [1] COVID-19 diagnosed by positive lab test (e g , PCR, rapid self-test kit) AND [2] mild symptoms (e g , cough, fever, others) AND [3] no complications or SOB    Answer Assessment - Initial Assessment Questions  Were you within 6 feet or less, for up to 15 minutes or more with a person that has a confirmed COVID-19 test? unsure        Are you experiencing any symptoms attributed to the virus?  (Assess for SOB, cough, fever, difficulty breathing) Fever 101 5 this morning, congestion, fatigue, and cough    HIGH RISK: Do you have any history heart or lung conditions, weakened immune system, diabetes, Asthma, CHF, HIV, COPD, Chemo, renal failure, sickle cell, etc? High BP and cholesterol are only past medical conditions    VACCINE: "Have you gotten the COVID-19 vaccine?" If Yes ask: "Which one, how many shots, when did you get it?" Yes, and booster done in November    Patient tested positive for Covid last night symptoms started a couple days ago      Protocols used: CORONAVIRUS (UFPFL-11) DIAGNOSED OR SUSPECTED-ADULT-OH

## 2022-04-08 NOTE — PATIENT INSTRUCTIONS
FACT SHEET FOR PATIENTS, PARENTS, AND CAREGIVERS   EMERGENCY USE AUTHORIZATION (EUA) OF PAXLOVID FOR CORONAVIRUS DISEASE 2019 (COVID-19)   You are being given this Fact Sheet because your healthcare provider believes it is necessary to provide you with PAXLOVID for the treatment of mild-to-moderate coronavirus disease (COVID-19) caused by the SARS-CoV-2 virus  This Fact Sheet contains information to help you understand the risks and benefits of taking the PAXLOVID you have received or may receive  The [place]  Food and Drug Administration (FDA) has issued an Emergency Use Authorization (EUA) to make PAXLOVID available during the COVID-19 pandemic (for more details about an EUA please see Tyron Lamar is an Emergency Use Authorization?  at the end of this document)  PAXLOVID is not an FDA-approved medicine in the [place]  Read this Fact Sheet for information about PAXLOVID  Talk to your healthcare provider about your options or if you have any questions  It is your choice to take PAXLOVID  What is COVID-19? COVID-19 is caused by a virus called a coronavirus  You can get COVID-19 through close contact with another person who has the virus  COVID-19 illnesses have ranged from very mild-to-severe, including illness resulting in death  While information so far suggests that most COVID-19 illness is mild, serious illness can happen and may cause some of your other medical conditions to become worse  Older people and people of all ages with severe, long lasting (chronic) medical conditions like heart disease, lung disease, and diabetes, for example seem to be at higher risk of being hospitalized for COVID-19  What is PAXLOVID?    Thresa Grant is an investigational medicine used to treat mild-to-moderate COVID-19 in adults and children [15years of age and older weighing at least 80 pounds (36 kg)] with positive results of direct SARS-CoV-2 viral testing, and who are at high risk for progression to severe COVID-19, including hospitalization or death  PAXLOVID is investigational because it is still being studied  There is limited information about the safety and effectiveness of using PAXLOVID to treat people with mild-to-moderate COVID-19  The FDA has authorized the emergency use of PAXLOVID for the treatment of mild-to-moderate COVID-19 in adults and children [15years of age and older weighing at least 80 pounds (36 kg)] with a positive test for the virus that causes COVID-19, and who are at high risk for progression to severe COVID-19, including hospitalization or death, under an EUA  1 Revised: 18 March 2022     What should I tell my healthcare provider before I take PAXLOVID? Tell your healthcare provider if you:    Have any allergies  Have liver or kidney disease  Are pregnant or plan to become pregnant  Are breastfeeding a child  Have any serious illnesses   Tell your healthcare provider about all the medicines you take, including prescription and over-the-counter medicines, vitamins, and herbal supplements  Some medicines may interact with PAXLOVID and may cause serious side effects  Keep a list of your medicines to show your healthcare provider and pharmacist when you get a new medicine  You can ask your healthcare provider or pharmacist for a list of medicines that interact with PAXLOVID  Do not start taking a new medicine without telling your healthcare provider  Your healthcare provider can tell you if it is safe to take PAXLOVID with other medicines  Tell your healthcare provider if you are taking combined hormonal contraceptive  PAXLOVID may affect how your birth control pills work  Females who are able to become pregnant should use another effective alternative form of contraception or an additional barrier method of contraception  Talk to your healthcare provider if you have any questions about contraceptive methods that might be right for you     How do I take PAXLOVID?    PAXLOVID consists of 2 medicines: nirmatrelvir and ritonavir  Take 2 pink tablets of nirmatrelvir with 1 white tablet of ritonavir by mouth 2 times each day (in the morning and in the evening) for 5 days  For each dose, take all 3 tablets at the same time  If you have kidney disease, talk to your healthcare provider  You may need a different dose   Swallow the tablets whole  Do not chew, break, or crush the tablets   Take PAXLOVID with or without food   Do not stop taking PAXLOVID without talking to your healthcare provider, even if   you feel better   If you miss a dose of PAXLOVID within 8 hours of the time it is usually taken, take it as soon as you remember  If you miss a dose by more than 8 hours, skip the missed dose and take the next dose at your regular time  Do not take 2 doses of PAXLOVID at the same time   If you take too much PAXLOVID, call your healthcare provider or go to the nearest hospital emergency room right away   If you are taking a ritonavir-or cobicistat-containing medicine to treat hepatitis C or Human Immunodeficiency Virus (HIV), you should continue to take your medicine as prescribed by your healthcare provider  2 Revised: 18 March 2022   Talk to your healthcare provider if you do not feel better or if you feel worse after 5 days  Who should generally not take PAXLOVID? Do not take PAXLOVID if:    You are allergic to nirmatrelvir, ritonavir, or any of the ingredients in PAXLOVID    You are taking any of the following medicines:    Alfuzosin    Pethidine, propoxyphene    Ranolazine    Amiodarone, dronedarone, flecainide, propafenone, quinidine    Colchicine    Lurasidone, pimozide, clozapine    Dihydroergotamine, ergotamine, methylergonovine    Lovastatin, simvastatin    Sildenafil (Revatio®) for pulmonary arterial hypertension (PAH)    Triazolam, oral midazolam    Apalutamide    Carbamazepine, phenobarbital, phenytoin    Rifampin    St  Bernards Wort (hypericum perforatum)     Taking PAXLOVID with these medicines may cause serious or life-threatening side effects or affect how PAXLOVID works  These are not the only medicines that may cause serious side effects if taken with PAXLOVID  PAXLOVID may increase or decrease the levels of multiple other medicines  It is very important to tell your healthcare provider about all of the medicines you are taking because additional laboratory tests or changes in the dose of your other medicines may be necessary while you are taking PAXLOVID  Your healthcare provider may also tell you about specific symptoms to watch out for that may indicate that you need to stop or decrease the dose of some of your other medicines  What are the important possible side effects of PAXLOVID? Possible side effects of PAXLOVID are:    Allergic Reactions  Allergic reactions can happen in people taking PAXLOVID, even after only 1 dose  Stop taking PAXLOVID and call your healthcare provider right away if you get any of the following symptoms of an allergic reaction:    hives    trouble swallowing or breathing    swelling of the mouth, lips, or face    throat tightness    hoarseness   3 Revised: 18 March 2022      skin rash      Liver Problems  Tell your healthcare provider right away if you have any of   these signs and symptoms of liver problems: loss of appetite, yellowing of your   skin and the whites of eyes (jaundice), dark-colored urine, pale colored stools   and itchy skin, stomach area (abdominal) pain   Resistance to HIV Medicines  If you have untreated HIV infection, PAXLOVID may lead to some HIV medicines not working as well in the future   Other possible side effects include:    altered sense of taste    diarrhea    high blood pressure    muscle aches     These are not all the possible side effects of PAXLOVID  Not many people have taken PAXLOVID  Serious and unexpected side effects may happen   [address] is still being studied, so it is possible that all of the risks are not known at this time  What other treatment choices are there? Veklury (remdesivir) is FDA-approved for the treatment of mild-to-moderate BAHTR-32 in certain adults and children  Talk with your doctor to see if Abhijit Arshad is appropriate for you  Like Delorise Carrel may also allow for the emergency use of other medicines to treat people with COVID-19  Go to https://Hiperos/ for information on the emergency use of other medicines that are authorized by FDA to treat people with COVID-19  Your healthcare provider may talk with you about clinical trials for which you may be eligible  It is your choice to be treated or not to be treated with PAXLOVID  Should you decide not to receive it or for your child not to receive it, it will not change your standard medical care  What if I am pregnant or breastfeeding? There is no experience treating pregnant women or breastfeeding mothers with PAXLOVID  For a mother and unborn baby, the benefit of taking PAXLOVID may be greater than the risk from the treatment  If you are pregnant, discuss your options and specific situation with your healthcare provider  It is recommended that you use effective barrier contraception or do not have sexual activity while taking PAXLOVID  If you are breastfeeding, discuss your options and specific situation with your healthcare provider  4 Revised: 18 March 2022   How do I report side effects with PAXLOVID? Contact your healthcare provider if you have any side effects that bother you or do not go away  Report side effects to FDA MedWatch at www fda gov/medwatch or call 3-838-QJS4255 or you can report side effects to [facility]  at the contact information provided below  Website  Fax number  Telephone number    [employer]  [phone]

## 2022-04-08 NOTE — TELEPHONE ENCOUNTER
Regarding: Appointment Request-Covid-[place] plan  ----- Message from Hien Rosenberg sent at 4/8/2022  9:38 AM EDT -----  " I tested COVID positive last night and I would like to be schedule to go over a [facility] [place] with my physician "

## 2022-04-11 ENCOUNTER — TELEMEDICINE (OUTPATIENT)
Dept: FAMILY MEDICINE CLINIC | Facility: CLINIC | Age: 42
End: 2022-04-11
Payer: COMMERCIAL

## 2022-04-11 DIAGNOSIS — U07.1 COVID-19: Primary | ICD-10-CM

## 2022-04-11 PROCEDURE — 99213 OFFICE O/P EST LOW 20 MIN: CPT | Performed by: FAMILY MEDICINE

## 2022-04-11 NOTE — PROGRESS NOTES
COVID-19 Outpatient Progress Note    Assessment/Plan:    Problem List Items Addressed This Visit        Other    COVID-19 - Primary         Disposition:     I have spent 8 minutes directly with the patient  Greater than 50% of this time was spent in counseling/coordination of care regarding: instructions for management and impressions  Day 6 of symptoms and day 4 of Paxlovid  Feeling better   Has developed loose stools and nausea with the medication   Last fever 72 hours ago   No chest symptoms   Spo2 99% on RA   Still with nasal congestion  Recommend flonase   Fully vaccinated and booster  Isolation is not neccessary, strict masking wearing for an additional 4 days  Continue antiviral  Continue to hold statins   Follow up as needed      Encounter provider Mata Mcneill MD    Provider located at [address] [id number]    Recent Visits  Date Type Provider Dept   04/08/22 [address] [place], MD [facility] [place] recent visits within past 7 days and meeting all other requirements  Future Appointments  No visits were found meeting these conditions  Showing future appointments within next 150 days and meeting all other requirements     This virtual check-in was done via [employer] [address] and patient was informed that this is a secure, HIPAA-compliant platform  He agrees to proceed  Patient agrees to participate in a virtual check in via telephone or video visit instead of presenting to the office to address urgent/immediate medical needs  Patient is aware this is a billable service  After connecting through [facility], the patient was identified by name and date of birth  Christo Santana was informed that this was a telemedicine visit and that the exam was being conducted confidentially over secure lines  The patient was notified the following individuals were present in the room: Dr Nelly Santana acknowledged consent and understanding of privacy and security of the telemedicine visit  I informed the patient that I have reviewed his record in Epic and presented the opportunity for him to ask any questions regarding the visit today  The patient agreed to participate  Verification of patient location:  Patient is located in the following state in which I hold an active license: [place]    Subjective:   Lu Busch is a 39 y o  male who has been screened for COVID-19  Patient's symptoms include fatigue, nasal congestion, nausea and diarrhea  Patient denies fever, sore throat, anosmia, loss of taste, cough, shortness of breath, chest tightness, abdominal pain, vomiting and headaches  - Date of symptom onset: 4/5/2022      COVID-19 vaccination status: Fully vaccinated with booster        Staying home and isolating themselves?: has not  He is taking care to not share personal items and is cleaning all surfaces that are touched often, like counters, tabletops, and doorknobs using household cleaning sprays or wipes  He is wearing a mask when he leaves his room       Day 6 of symptoms  Day 4 of Paxlovid   Experiencing nausea and loose stools but is overall feeling better  Spo2 99%  Last fever was 72 hours ago  Still with nasal congestion and fatigue  No chest symptoms      Lab Results   Component Value Date    SARSCOV2 Negative 12/17/2021    SARSCORONAVI NOT DETECTED 09/18/2020     Past Medical History:   Diagnosis Date    Acute non-recurrent maxillary sinusitis 2/4/2020    Acute swimmer's ear of left side 2/11/2020    Allergic     Anxiety     GERD (gastroesophageal reflux disease)     Hyperlipidemia     Hypertension     Impacted cerumen, right ear 2/11/2020     Past Surgical History:   Procedure Laterality Date    TONSILECTOMY AND ADNOIDECTOMY      TONSILLECTOMY      UPPER GASTROINTESTINAL ENDOSCOPY       Current Outpatient Medications   Medication Sig Dispense Refill    amLODIPine-benazepril (LOTREL) 10-20 MG per capsule TAKE 1 CAPSULE BY MOUTH EVERY DAY 90 capsule 3    citalopram (CeleXA) 20 mg tablet TAKE 1 TABLET BY MOUTH EVERY DAY 90 tablet 3    ergocalciferol (VITAMIN D2) 50,000 units Take 1 capsule (50,000 Units total) by mouth once a week 12 capsule 0    famotidine (PEPCID) 40 MG tablet Take 1 tablet (40 mg total) by mouth daily 90 tablet 1    LORazepam (ATIVAN) 0 5 mg tablet Take 1 tablet (0 5 mg total) by mouth daily at bedtime 30 tablet 5    nirmatrelvir & ritonavir (Paxlovid) tablet therapy pack Take 3 tablets by mouth 2 (two) times a day for 5 days Take 2 nirmatrelvir tablets + 1 ritonavir tablet together per dose 30 tablet 0    rosuvastatin (CRESTOR) 10 MG tablet Take 1 tablet (10 mg total) by mouth daily 90 tablet 3     No current facility-administered medications for this visit  Allergies   Allergen Reactions    Ceclor [Cefaclor]     Pollen Extract        Review of Systems   Constitutional: Positive for fatigue  Negative for fever  HENT: Positive for congestion  Negative for sore throat  Respiratory: Negative for cough, chest tightness and shortness of breath  Gastrointestinal: Positive for diarrhea and nausea  Negative for abdominal pain and vomiting  Neurological: Negative for headaches  Objective: There were no vitals filed for this visit  Physical Exam  Vitals reviewed  Constitutional:       General: He is not in acute distress  Appearance: Normal appearance  He is not ill-appearing or toxic-appearing  HENT:      Head: Normocephalic and atraumatic  Nose: Congestion present  Eyes:      Extraocular Movements: Extraocular movements intact  Pulmonary:      Effort: Pulmonary effort is normal    Skin:     Coloration: Skin is not pale  Neurological:      Mental Status: He is alert and oriented to person, place, and time  Psychiatric:         Mood and Affect: Mood normal          Behavior: Behavior normal          Thought Content:  Thought content normal  VIRTUAL VISIT DISCLAIMER    Hugo Matthias verbally agrees to participate in [employer]  Pt is aware that [employer] could be limited without vital signs or the ability to perform a full hands-on physical [address] understands he or the provider may request at any time to terminate the video visit and request the patient to seek care or treatment in person

## 2022-04-15 DIAGNOSIS — E78.5 BORDERLINE HYPERLIPIDEMIA: ICD-10-CM

## 2022-04-15 RX ORDER — ROSUVASTATIN CALCIUM 10 MG/1
TABLET, COATED ORAL
Qty: 90 TABLET | Refills: 3 | Status: SHIPPED | OUTPATIENT
Start: 2022-04-15

## 2022-06-04 ENCOUNTER — APPOINTMENT (OUTPATIENT)
Dept: LAB | Facility: HOSPITAL | Age: 42
End: 2022-06-04
Payer: COMMERCIAL

## 2022-06-04 DIAGNOSIS — K13.70 MOUTH LESION: ICD-10-CM

## 2022-06-04 DIAGNOSIS — E78.5 BORDERLINE HYPERLIPIDEMIA: ICD-10-CM

## 2022-06-04 DIAGNOSIS — I10 BENIGN ESSENTIAL HYPERTENSION: ICD-10-CM

## 2022-06-04 DIAGNOSIS — E03.8 SUBCLINICAL HYPOTHYROIDISM: ICD-10-CM

## 2022-06-04 LAB
ALBUMIN SERPL BCP-MCNC: 4 G/DL (ref 3.5–5)
ALP SERPL-CCNC: 58 U/L (ref 46–116)
ALT SERPL W P-5'-P-CCNC: 59 U/L (ref 12–78)
ANION GAP SERPL CALCULATED.3IONS-SCNC: 3 MMOL/L (ref 4–13)
AST SERPL W P-5'-P-CCNC: 29 U/L (ref 5–45)
BILIRUB SERPL-MCNC: 0.75 MG/DL (ref 0.2–1)
BUN SERPL-MCNC: 13 MG/DL (ref 5–25)
CALCIUM SERPL-MCNC: 9.6 MG/DL (ref 8.3–10.1)
CHLORIDE SERPL-SCNC: 106 MMOL/L (ref 100–108)
CHOLEST SERPL-MCNC: 168 MG/DL
CO2 SERPL-SCNC: 31 MMOL/L (ref 21–32)
CREAT SERPL-MCNC: 0.97 MG/DL (ref 0.6–1.3)
ERYTHROCYTE [DISTWIDTH] IN BLOOD BY AUTOMATED COUNT: 11.8 % (ref 11.6–15.1)
GFR SERPL CREATININE-BSD FRML MDRD: 95 ML/MIN/1.73SQ M
GLUCOSE P FAST SERPL-MCNC: 102 MG/DL (ref 65–99)
HCT VFR BLD AUTO: 46 % (ref 36.5–49.3)
HDLC SERPL-MCNC: 44 MG/DL
HGB BLD-MCNC: 15.4 G/DL (ref 12–17)
LDLC SERPL CALC-MCNC: 99 MG/DL (ref 0–100)
MCH RBC QN AUTO: 29.7 PG (ref 26.8–34.3)
MCHC RBC AUTO-ENTMCNC: 33.5 G/DL (ref 31.4–37.4)
MCV RBC AUTO: 89 FL (ref 82–98)
PLATELET # BLD AUTO: 284 THOUSANDS/UL (ref 149–390)
PMV BLD AUTO: 9.2 FL (ref 8.9–12.7)
POTASSIUM SERPL-SCNC: 4.2 MMOL/L (ref 3.5–5.3)
PROT SERPL-MCNC: 7.7 G/DL (ref 6.4–8.2)
RBC # BLD AUTO: 5.19 MILLION/UL (ref 3.88–5.62)
SODIUM SERPL-SCNC: 140 MMOL/L (ref 136–145)
TRIGL SERPL-MCNC: 123 MG/DL
TSH SERPL DL<=0.05 MIU/L-ACNC: 3.91 UIU/ML (ref 0.45–4.5)
WBC # BLD AUTO: 5.07 THOUSAND/UL (ref 4.31–10.16)

## 2022-06-04 PROCEDURE — 85027 COMPLETE CBC AUTOMATED: CPT

## 2022-06-04 PROCEDURE — 83516 IMMUNOASSAY NONANTIBODY: CPT

## 2022-06-04 PROCEDURE — 36415 COLL VENOUS BLD VENIPUNCTURE: CPT

## 2022-06-04 PROCEDURE — 80053 COMPREHEN METABOLIC PANEL: CPT

## 2022-06-04 PROCEDURE — 80061 LIPID PANEL: CPT

## 2022-06-04 PROCEDURE — 84443 ASSAY THYROID STIM HORMONE: CPT

## 2022-06-08 LAB
GLIADIN IGG SER IA-ACNC: 48 UNITS (ref 0–19)
GLIADIN PEPTIDE+TTG IGA+IGG SER QL IA: NEGATIVE
NOTE: NORMAL

## 2022-08-16 ENCOUNTER — OFFICE VISIT (OUTPATIENT)
Dept: FAMILY MEDICINE CLINIC | Facility: CLINIC | Age: 42
End: 2022-08-16
Payer: COMMERCIAL

## 2022-08-16 VITALS
HEART RATE: 64 BPM | DIASTOLIC BLOOD PRESSURE: 84 MMHG | BODY MASS INDEX: 28.49 KG/M2 | TEMPERATURE: 97.1 F | OXYGEN SATURATION: 98 % | SYSTOLIC BLOOD PRESSURE: 130 MMHG | WEIGHT: 188 LBS | RESPIRATION RATE: 14 BRPM | HEIGHT: 68 IN

## 2022-08-16 DIAGNOSIS — Z00.00 ANNUAL PHYSICAL EXAM: Primary | ICD-10-CM

## 2022-08-16 DIAGNOSIS — R73.01 IFG (IMPAIRED FASTING GLUCOSE): ICD-10-CM

## 2022-08-16 PROBLEM — J34.89 RHINORRHEA: Status: RESOLVED | Noted: 2022-03-29 | Resolved: 2022-08-16

## 2022-08-16 PROBLEM — R09.81 NASAL CONGESTION: Status: RESOLVED | Noted: 2022-03-29 | Resolved: 2022-08-16

## 2022-08-16 PROBLEM — H10.13 ALLERGIC CONJUNCTIVITIS, BILATERAL: Status: RESOLVED | Noted: 2022-03-29 | Resolved: 2022-08-16

## 2022-08-16 PROBLEM — K13.70 MOUTH LESION: Status: RESOLVED | Noted: 2022-01-05 | Resolved: 2022-08-16

## 2022-08-16 PROBLEM — Z86.16 HISTORY OF COVID-19: Status: ACTIVE | Noted: 2022-04-11

## 2022-08-16 PROCEDURE — 99396 PREV VISIT EST AGE 40-64: CPT | Performed by: FAMILY MEDICINE

## 2022-08-16 PROCEDURE — 3075F SYST BP GE 130 - 139MM HG: CPT | Performed by: FAMILY MEDICINE

## 2022-08-16 PROCEDURE — 3079F DIAST BP 80-89 MM HG: CPT | Performed by: FAMILY MEDICINE

## 2022-08-16 NOTE — PATIENT INSTRUCTIONS

## 2022-08-16 NOTE — PROGRESS NOTES
[address]    NAME: Laura Reasons  AGE: 43 y o  SEX: male  : 1980     DATE: 2022     Assessment and Plan:     Problem List Items Addressed This Visit        Other    Annual physical exam - Primary     Up to date  Needs fall influenza           Other Visit Diagnoses     IFG (impaired fasting glucose)        Relevant Orders    Hemoglobin A1C          Immunizations and preventive care screenings were discussed with patient today  Appropriate education was printed on patient's after visit summary  Counseling:  Dental Health: discussed importance of regular tooth brushing, flossing, and dental visits  Return in 6 months (on 2023) for Recheck  Chief Complaint:     Chief Complaint   Patient presents with    Annual Exam     Patient here for annual wellness       History of Present Illness:     Adult Annual Physical   Patient here for a comprehensive physical exam  The patient reports problems - had covid  Diet and Physical Activity  Diet/Nutrition: well balanced diet  Exercise: 3-4 times a week on average  Depression Screening  PHQ-2/9 Depression Screening         General Health  Sleep: sleeps well  Hearing: normal - bilateral   Vision: no vision problems  Dental: regular dental visits   Health  Symptoms include: none     Review of Systems:     Review of Systems   Constitutional: Negative  HENT: Negative  Eyes: Negative  Respiratory: Negative  Cardiovascular: Negative  Gastrointestinal: Negative  Endocrine: Negative  Genitourinary: Negative  Musculoskeletal: Negative for back pain  Skin: Negative  Allergic/Immunologic: Negative  Neurological: Negative  Hematological: Negative  Psychiatric/Behavioral: Negative         Past Medical History:     Past Medical History:   Diagnosis Date    Acute non-recurrent maxillary sinusitis 2020    Acute swimmer's ear of left side 2/11/2020    Allergic     Anxiety     GERD (gastroesophageal reflux disease)     Hyperlipidemia     Hypertension     Impacted cerumen, right ear 2/11/2020      Past Surgical History:     Past Surgical History:   Procedure Laterality Date    TONSILECTOMY AND ADNOIDECTOMY      TONSILLECTOMY      UPPER GASTROINTESTINAL ENDOSCOPY        Family History:     Family History   Problem Relation Age of Onset    Diabetes Father     Hypertension Father     Hypertension Brother     Breast cancer Paternal Grandmother     Other Paternal Grandfather         Acute myocardial infarction     Colon cancer Maternal Uncle       Social History:     Social History     Socioeconomic History    Marital status: /Civil Union     Spouse name: None    Number of children: None    Years of education: None    Highest education level: None   Occupational History    None   Tobacco Use    Smoking status: Never Smoker    Smokeless tobacco: Never Used   Vaping Use    Vaping Use: Never used   Substance and Sexual Activity    Alcohol use: Yes     Comment: 1 per week    Drug use: No    Sexual activity: Yes   Other Topics Concern    None   Social History Narrative    Daily coffee consumption (2 cups/day)        Who lives in your home: wife and 2 children    What type of home do you live in: Single house    Age of your home: 12 years    How long have you been living there: 10 years    Type of heat: Forced hot air    Type of fuel: Gas    What type of dalton is in your bedroom: Carpet    Do you have the following in or near your home:    Air products: Central air    Pests: None    Pets: Dog    Are pets allowed in bedroom: Yes    Open fields, wooded areas nearby: Open fields and Wooded areas    Basement: Finished    Exposure to second hand smoke: No        Habits:    Caffeine: yes approx 2 cups per day    Chocolate:      Other:     Social Determinants of Health     Financial Resource Strain: Not on file   Food Insecurity: Not on file   Transportation Needs: Not on file   Physical Activity: Not on file   Stress: Not on file   Social Connections: Not on file   Intimate Partner Violence: Not on file   Housing Stability: Not on file      Current Medications:     Current Outpatient Medications   Medication Sig Dispense Refill    amLODIPine-benazepril (LOTREL) 10-20 MG per capsule TAKE 1 CAPSULE BY MOUTH EVERY DAY 90 capsule 3    citalopram (CeleXA) 20 mg tablet TAKE 1 TABLET BY MOUTH EVERY DAY 90 tablet 3    LORazepam (ATIVAN) 0 5 mg tablet Take 1 tablet (0 5 mg total) by mouth daily at bedtime 30 tablet 3    rosuvastatin (CRESTOR) 10 MG tablet TAKE 1 TABLET BY MOUTH EVERY DAY 90 tablet 3     No current facility-administered medications for this visit  Allergies: Allergies   Allergen Reactions    Ceclor [Cefaclor]     Pollen Extract       Physical Exam:     /84 (BP Location: Left arm, Patient Position: Sitting, Cuff Size: Standard)   Pulse 64   Temp (!) 97 1 °F (36 2 °C) (Tympanic)   Resp 14   Ht 5' 8" (1 727 m)   Wt 85 3 kg (188 lb)   SpO2 98%   BMI 28 59 kg/m²     Physical Exam  Vitals and nursing note reviewed  Constitutional:       Appearance: Normal appearance  He is well-developed  HENT:      Head: Normocephalic and atraumatic  Right Ear: External ear normal       Left Ear: External ear normal       Nose: Nose normal    Eyes:      Extraocular Movements: Extraocular movements intact  Conjunctiva/sclera: Conjunctivae normal       Pupils: Pupils are equal, round, and reactive to light  Cardiovascular:      Rate and Rhythm: Normal rate and regular rhythm  Heart sounds: Normal heart sounds  Pulmonary:      Effort: Pulmonary effort is normal       Breath sounds: Normal breath sounds  Abdominal:      General: Abdomen is flat  Bowel sounds are normal       Palpations: Abdomen is soft  Musculoskeletal:         General: Normal range of motion        Cervical back: Normal range of motion and neck supple  Skin:     General: Skin is warm and dry  Capillary Refill: Capillary refill takes less than 2 seconds  Neurological:      General: No focal deficit present  Mental Status: He is alert and oriented to person, place, and time  Psychiatric:         Mood and Affect: Mood normal          Behavior: Behavior normal          Thought Content:  Thought content normal          Judgment: Judgment normal           Kelly Comer DO  [address] [facility]

## 2022-12-08 ENCOUNTER — TELEPHONE (OUTPATIENT)
Dept: OTHER | Facility: OTHER | Age: 42
End: 2022-12-08

## 2022-12-08 NOTE — TELEPHONE ENCOUNTER
Patient called saying that he receive a letter in the mail stating that he needs to give the office a call to schedule an appointment, patient is asking f the office can give him back a call to schedule his appointment

## 2022-12-22 ENCOUNTER — TELEPHONE (OUTPATIENT)
Dept: FAMILY MEDICINE CLINIC | Facility: CLINIC | Age: 42
End: 2022-12-22

## 2022-12-22 DIAGNOSIS — H35.60 RETINAL HEMORRHAGE, UNSPECIFIED LATERALITY: ICD-10-CM

## 2022-12-22 DIAGNOSIS — E78.5 BORDERLINE HYPERLIPIDEMIA: ICD-10-CM

## 2022-12-22 DIAGNOSIS — I10 BENIGN ESSENTIAL HYPERTENSION: ICD-10-CM

## 2022-12-22 DIAGNOSIS — E03.8 SUBCLINICAL HYPOTHYROIDISM: Primary | ICD-10-CM

## 2022-12-22 NOTE — TELEPHONE ENCOUNTER
Doctor Emma Anthony put in orders for blood work for patient  He has also communicated with patient via my chart

## 2022-12-22 NOTE — TELEPHONE ENCOUNTER
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

## 2022-12-23 ENCOUNTER — APPOINTMENT (OUTPATIENT)
Dept: LAB | Facility: CLINIC | Age: 42
End: 2022-12-23

## 2022-12-23 DIAGNOSIS — I10 BENIGN ESSENTIAL HYPERTENSION: ICD-10-CM

## 2022-12-23 DIAGNOSIS — E78.5 BORDERLINE HYPERLIPIDEMIA: ICD-10-CM

## 2022-12-23 DIAGNOSIS — H35.60 RETINAL HEMORRHAGE, UNSPECIFIED LATERALITY: ICD-10-CM

## 2022-12-23 DIAGNOSIS — E03.8 SUBCLINICAL HYPOTHYROIDISM: ICD-10-CM

## 2022-12-23 DIAGNOSIS — R73.01 IFG (IMPAIRED FASTING GLUCOSE): ICD-10-CM

## 2022-12-23 LAB
ALBUMIN SERPL BCP-MCNC: 4.3 G/DL (ref 3.5–5)
ALP SERPL-CCNC: 46 U/L (ref 34–104)
ALT SERPL W P-5'-P-CCNC: 27 U/L (ref 7–52)
ANION GAP SERPL CALCULATED.3IONS-SCNC: 5 MMOL/L (ref 4–13)
AST SERPL W P-5'-P-CCNC: 21 U/L (ref 13–39)
BILIRUB SERPL-MCNC: 0.79 MG/DL (ref 0.2–1)
BUN SERPL-MCNC: 16 MG/DL (ref 5–25)
CALCIUM SERPL-MCNC: 9.4 MG/DL (ref 8.4–10.2)
CHLORIDE SERPL-SCNC: 103 MMOL/L (ref 96–108)
CHOLEST SERPL-MCNC: 151 MG/DL
CO2 SERPL-SCNC: 29 MMOL/L (ref 21–32)
CREAT SERPL-MCNC: 0.97 MG/DL (ref 0.6–1.3)
ERYTHROCYTE [DISTWIDTH] IN BLOOD BY AUTOMATED COUNT: 11.5 % (ref 11.6–15.1)
EST. AVERAGE GLUCOSE BLD GHB EST-MCNC: 103 MG/DL
GFR SERPL CREATININE-BSD FRML MDRD: 95 ML/MIN/1.73SQ M
GLUCOSE P FAST SERPL-MCNC: 99 MG/DL (ref 65–99)
HBA1C MFR BLD: 5.2 %
HCT VFR BLD AUTO: 42.9 % (ref 36.5–49.3)
HDLC SERPL-MCNC: 41 MG/DL
HGB BLD-MCNC: 14.5 G/DL (ref 12–17)
INR PPP: 0.97 (ref 0.84–1.19)
LDLC SERPL CALC-MCNC: 93 MG/DL (ref 0–100)
MCH RBC QN AUTO: 29.5 PG (ref 26.8–34.3)
MCHC RBC AUTO-ENTMCNC: 33.8 G/DL (ref 31.4–37.4)
MCV RBC AUTO: 87 FL (ref 82–98)
PLATELET # BLD AUTO: 275 THOUSANDS/UL (ref 149–390)
PMV BLD AUTO: 8.9 FL (ref 8.9–12.7)
POTASSIUM SERPL-SCNC: 4.1 MMOL/L (ref 3.5–5.3)
PROT SERPL-MCNC: 6.9 G/DL (ref 6.4–8.4)
PROTHROMBIN TIME: 13.1 SECONDS (ref 11.6–14.5)
RBC # BLD AUTO: 4.92 MILLION/UL (ref 3.88–5.62)
SODIUM SERPL-SCNC: 137 MMOL/L (ref 135–147)
T4 FREE SERPL-MCNC: 1 NG/DL (ref 0.76–1.46)
TRIGL SERPL-MCNC: 87 MG/DL
TSH SERPL DL<=0.05 MIU/L-ACNC: 5.74 UIU/ML (ref 0.45–4.5)
WBC # BLD AUTO: 5.9 THOUSAND/UL (ref 4.31–10.16)

## 2022-12-27 DIAGNOSIS — H35.60 RETINAL HEMORRHAGE, UNSPECIFIED LATERALITY: Primary | ICD-10-CM

## 2022-12-28 ENCOUNTER — APPOINTMENT (OUTPATIENT)
Dept: LAB | Facility: CLINIC | Age: 42
End: 2022-12-28

## 2022-12-28 DIAGNOSIS — H35.60 RETINAL HEMORRHAGE, UNSPECIFIED LATERALITY: ICD-10-CM

## 2022-12-28 LAB
ATRIAL RATE: 53 BPM
P AXIS: 2 DEGREES
PR INTERVAL: 142 MS
QRS AXIS: 36 DEGREES
QRSD INTERVAL: 98 MS
QT INTERVAL: 424 MS
QTC INTERVAL: 397 MS
T WAVE AXIS: 32 DEGREES
VENTRICULAR RATE: 53 BPM

## 2022-12-29 ENCOUNTER — HOSPITAL ENCOUNTER (OUTPATIENT)
Dept: NON INVASIVE DIAGNOSTICS | Facility: CLINIC | Age: 42
Discharge: HOME | End: 2022-12-29

## 2022-12-29 DIAGNOSIS — H35.60 RETINAL HEMORRHAGE, UNSPECIFIED LATERALITY: ICD-10-CM

## 2023-01-04 ENCOUNTER — OFFICE VISIT (OUTPATIENT)
Dept: FAMILY MEDICINE CLINIC | Facility: CLINIC | Age: 43
End: 2023-01-04

## 2023-01-04 VITALS
RESPIRATION RATE: 15 BRPM | TEMPERATURE: 97.9 F | HEART RATE: 70 BPM | OXYGEN SATURATION: 99 % | DIASTOLIC BLOOD PRESSURE: 80 MMHG | BODY MASS INDEX: 28.64 KG/M2 | WEIGHT: 189 LBS | SYSTOLIC BLOOD PRESSURE: 134 MMHG | HEIGHT: 68 IN

## 2023-01-04 DIAGNOSIS — H35.61 RETINAL HEMORRHAGE OF RIGHT EYE: Primary | ICD-10-CM

## 2023-01-04 DIAGNOSIS — I10 BENIGN ESSENTIAL HYPERTENSION: ICD-10-CM

## 2023-01-04 DIAGNOSIS — E03.8 SUBCLINICAL HYPOTHYROIDISM: ICD-10-CM

## 2023-01-04 DIAGNOSIS — Z82.49 FAMILY HISTORY OF EARLY CAD: ICD-10-CM

## 2023-01-04 DIAGNOSIS — E78.5 BORDERLINE HYPERLIPIDEMIA: ICD-10-CM

## 2023-01-04 NOTE — PROGRESS NOTES
Assessment/Plan:    1  Retinal hemorrhage of right eye  Assessment & Plan:  Work up negative      2  Benign essential hypertension  -     Ambulatory Referral to Cardiology; Future    3  Subclinical hypothyroidism  Assessment & Plan:  Non symptoms  Will monitor      4  Borderline hyperlipidemia  Assessment & Plan: On crestor    Orders:  -     Ambulatory Referral to Cardiology; Future    5  Family history of early CAD  -     Ambulatory Referral to Cardiology; Future      BMI Counseling: Body mass index is 28 74 kg/m²  The BMI is above normal  Nutrition recommendations include encouraging healthy choices of fruits and vegetables  Exercise recommendations include exercising 3-5 times per week  No pharmacotherapy was ordered  Rationale for BMI follow-up plan is due to patient being overweight or obese  Depression Screening and Follow-up Plan: Patient was screened for depression during today's encounter  They screened negative with a PHQ-2 score of 0  There are no Patient Instructions on file for this visit  Return in about 4 months (around 5/4/2023) for Recheck  Subjective:      Patient ID: Justine Hayes is a 43 y o  male  Chief Complaint   Patient presents with   • Follow-up     Patient here for follow up       Diagnosed with retinal hemorhage in right eye  Went for routine eye exam  Work up normal  Labs, carotids and ekg are normal    Hypertension  This is a chronic problem  The current episode started more than 1 year ago  The problem is unchanged  The problem is controlled  There are no associated agents to hypertension  Past treatments include calcium channel blockers and ACE inhibitors  The current treatment provides significant improvement  There are no compliance problems          The following portions of the patient's history were reviewed and updated as appropriate: allergies, current medications, past family history, past medical history, past social history, past surgical history and problem list     Review of Systems   Constitutional: Negative  HENT: Negative  Eyes: Negative  Respiratory: Negative  Cardiovascular: Negative  Gastrointestinal: Negative  Endocrine: Negative  Genitourinary: Negative  Musculoskeletal: Negative  Allergic/Immunologic: Negative  Neurological: Negative  Hematological: Negative  Psychiatric/Behavioral: The patient is nervous/anxious  Current Outpatient Medications   Medication Sig Dispense Refill   • amLODIPine-benazepril (LOTREL) 10-20 MG per capsule TAKE 1 CAPSULE BY MOUTH EVERY DAY 90 capsule 3   • citalopram (CeleXA) 20 mg tablet TAKE 1 TABLET BY MOUTH EVERY DAY 90 tablet 3   • LORazepam (ATIVAN) 0 5 mg tablet Take 1 tablet (0 5 mg total) by mouth daily at bedtime 30 tablet 5   • rosuvastatin (CRESTOR) 10 MG tablet TAKE 1 TABLET BY MOUTH EVERY DAY 90 tablet 3     No current facility-administered medications for this visit  Objective:    /80 (BP Location: Left arm, Patient Position: Sitting, Cuff Size: Standard)   Pulse 70   Temp 97 9 °F (36 6 °C) (Tympanic)   Resp 15   Ht 5' 8" (1 727 m)   Wt 85 7 kg (189 lb)   SpO2 99%   BMI 28 74 kg/m²        Physical Exam  Vitals and nursing note reviewed  Constitutional:       Appearance: Normal appearance  He is well-developed  HENT:      Head: Normocephalic and atraumatic  Right Ear: External ear normal       Left Ear: External ear normal       Nose: Nose normal    Eyes:      General: Lids are normal       Conjunctiva/sclera: Conjunctivae normal       Pupils: Pupils are equal, round, and reactive to light  Cardiovascular:      Rate and Rhythm: Normal rate and regular rhythm  Pulses: Normal pulses  Heart sounds: Normal heart sounds, S1 normal and S2 normal    Pulmonary:      Effort: Pulmonary effort is normal       Breath sounds: Normal breath sounds  Abdominal:      General: Bowel sounds are normal       Palpations: Abdomen is soft  Musculoskeletal:         General: Normal range of motion  Cervical back: Normal range of motion and neck supple  Skin:     General: Skin is warm and dry  Neurological:      General: No focal deficit present  Mental Status: He is alert and oriented to person, place, and time  Deep Tendon Reflexes: Reflexes are normal and symmetric  Psychiatric:         Speech: Speech normal          Behavior: Behavior normal          Thought Content:  Thought content normal          Judgment: Judgment normal                 Radha Stairs, DO

## 2023-01-22 DIAGNOSIS — F41.1 GENERALIZED ANXIETY DISORDER: ICD-10-CM

## 2023-01-22 RX ORDER — CITALOPRAM 20 MG/1
TABLET ORAL
Qty: 90 TABLET | Refills: 3 | Status: SHIPPED | OUTPATIENT
Start: 2023-01-22

## 2023-02-20 DIAGNOSIS — I10 ESSENTIAL HYPERTENSION: ICD-10-CM

## 2023-02-20 RX ORDER — AMLODIPINE BESYLATE AND BENAZEPRIL HYDROCHLORIDE 10; 20 MG/1; MG/1
CAPSULE ORAL
Qty: 90 CAPSULE | Refills: 3 | Status: SHIPPED | OUTPATIENT
Start: 2023-02-20 | End: 2023-02-22 | Stop reason: SDUPTHER

## 2023-02-22 ENCOUNTER — OFFICE VISIT (OUTPATIENT)
Dept: CARDIOLOGY CLINIC | Facility: MEDICAL CENTER | Age: 43
End: 2023-02-22

## 2023-02-22 VITALS
WEIGHT: 189 LBS | DIASTOLIC BLOOD PRESSURE: 82 MMHG | HEART RATE: 73 BPM | BODY MASS INDEX: 28.64 KG/M2 | SYSTOLIC BLOOD PRESSURE: 130 MMHG | OXYGEN SATURATION: 96 % | HEIGHT: 68 IN

## 2023-02-22 DIAGNOSIS — H35.61 RETINAL HEMORRHAGE OF RIGHT EYE: Primary | ICD-10-CM

## 2023-02-22 DIAGNOSIS — I10 BENIGN ESSENTIAL HYPERTENSION: ICD-10-CM

## 2023-02-22 DIAGNOSIS — E78.5 BORDERLINE HYPERLIPIDEMIA: ICD-10-CM

## 2023-02-22 DIAGNOSIS — Z82.49 FAMILY HISTORY OF EARLY CAD: ICD-10-CM

## 2023-02-22 RX ORDER — ROSUVASTATIN CALCIUM 20 MG/1
20 TABLET, COATED ORAL DAILY
Qty: 90 TABLET | Refills: 3 | Status: SHIPPED | OUTPATIENT
Start: 2023-02-22

## 2023-02-22 NOTE — PROGRESS NOTES
Cardiology   Sanchez Reasons 43 y o  male MRN: [id number]        Impression:  1  Hypertension - controlled  2  R retinal hemorrhage - resolved  3  Dyslipidemia - on statin  Elevated CT coronary calcium score  Recommendations:  1  Increase Rosuvastatin to 20mg daily  2  Continue remainder of medications  3  Limit salt intake  4  Drink appropriate fluids  5  Follow up in one year  HPI: Sanchez Reasons is a 43y o  year old male with hypertension, R retinal hemorrhage, borderline dyslipidemia, who presents for evaluation  Has been on BP medication for 5-10 years  Asymptomatic from cardiac standpoint  No chest pain, shortness of breath, or palpitations  Has elevated CT coronary calcium score in 2021, negative carotid duplex  Review of Systems   Constitutional: Negative  HENT: Negative  Eyes: Negative  Respiratory: Negative for chest tightness and shortness of breath  Cardiovascular: Negative for chest pain, palpitations and leg swelling  Gastrointestinal: Negative  Endocrine: Negative  Genitourinary: Negative  Musculoskeletal: Negative  Skin: Negative  Allergic/Immunologic: Negative  Neurological: Negative  Hematological: Negative  Psychiatric/Behavioral: Negative  All other systems reviewed and are negative          Past Medical History:   Diagnosis Date   • Acute non-recurrent maxillary sinusitis 2/4/2020   • Acute swimmer's ear of left side 2/11/2020   • Allergic    • Anxiety    • GERD (gastroesophageal reflux disease)    • Hyperlipidemia    • Hypertension    • Impacted cerumen, right ear 2/11/2020     Past Surgical History:   Procedure Laterality Date   • TONSILECTOMY AND ADNOIDECTOMY     • TONSILLECTOMY     • UPPER GASTROINTESTINAL ENDOSCOPY       Social History     Substance and Sexual Activity   Alcohol Use Yes    Comment: 1 per week     Social History     Substance and Sexual Activity   Drug Use No     Social History     Tobacco Use Smoking Status Never   Smokeless Tobacco Never     Family History   Problem Relation Age of Onset   • Heart disease Father    • Diabetes Father    • Hypertension Father    • Hypertension Brother    • Colon cancer Maternal Uncle    • Breast cancer Paternal Grandmother    • Heart disease Paternal Grandfather    • Other Paternal Grandfather         Acute myocardial infarction        Allergies: Allergies   Allergen Reactions   • Ceclor [Cefaclor]    • Pollen Extract        Medications:     Current Outpatient Medications:   •  amLODIPine-benazepril (LOTREL) 10-20 MG per capsule, Take 1 capsule by mouth daily, Disp: 90 capsule, Rfl: 3  •  citalopram (CeleXA) 20 mg tablet, TAKE 1 TABLET BY MOUTH EVERY DAY, Disp: 90 tablet, Rfl: 3  •  LORazepam (ATIVAN) 0 5 mg tablet, Take 1 tablet (0 5 mg total) by mouth daily at bedtime, Disp: 30 tablet, Rfl: 5  •  rosuvastatin (CRESTOR) 10 MG tablet, TAKE 1 TABLET BY MOUTH EVERY DAY, Disp: 90 tablet, Rfl: 3      Wt Readings from Last 3 Encounters:   02/22/23 85 7 kg (189 lb)   01/04/23 85 7 kg (189 lb)   08/16/22 85 3 kg (188 lb)     Temp Readings from Last 3 Encounters:   01/04/23 97 9 °F (36 6 °C) (Tympanic)   08/16/22 (!) 97 1 °F (36 2 °C) (Tympanic)   04/21/22 97 6 °F (36 4 °C)     BP Readings from Last 3 Encounters:   02/22/23 130/82   01/04/23 134/80   08/16/22 130/84     Pulse Readings from Last 3 Encounters:   02/22/23 73   01/04/23 70   08/16/22 64         Physical Exam  HENT:      Head: Atraumatic  Mouth/Throat:      Mouth: Mucous membranes are moist    Eyes:      Extraocular Movements: Extraocular movements intact  Cardiovascular:      Rate and Rhythm: Normal rate and regular rhythm  Heart sounds: Normal heart sounds  Pulmonary:      Effort: Pulmonary effort is normal       Breath sounds: Normal breath sounds  Abdominal:      General: Abdomen is flat  Musculoskeletal:         General: Normal range of motion  Cervical back: Normal range of motion  Skin:     General: Skin is warm  Neurological:      General: No focal deficit present  Mental Status: He is alert and oriented to person, place, and time  Psychiatric:         Mood and Affect: Mood normal          Behavior: Behavior normal            Laboratory Studies:  CMP:  Lab Results   Component Value Date    K 4 1 12/23/2022     12/23/2022    CO2 29 12/23/2022    BUN 16 12/23/2022    CREATININE 0 97 12/23/2022    AST 21 12/23/2022    ALT 27 12/23/2022    EGFR 95 12/23/2022       Lipid Profile:   No results found for: CHOL  Lab Results   Component Value Date    HDL 41 12/23/2022     Lab Results   Component Value Date    LDLCALC 93 12/23/2022     Lab Results   Component Value Date    TRIG 87 12/23/2022       Cardiac testing:   EKG reviewed personally:   No results found for this or any previous visit  No results found for this or any previous visit  No results found for this or any previous visit  No results found for this or any previous visit

## 2023-02-22 NOTE — PATIENT INSTRUCTIONS
Recommendations:  1  Increase Rosuvastatin to 20mg daily  2  Continue remainder of medications  3  Limit salt intake  4  Drink appropriate fluids  5  Follow up in one year

## 2023-04-12 NOTE — PROGRESS NOTES
Assessment/Plan:    1  Benign essential hypertension  Assessment & Plan:  Goal LDL is less then 100    Orders:  -     Comprehensive metabolic panel; Future; Expected date: 09/01/2021    2  Vitamin D deficiency  Assessment & Plan:  Start 98079 iu    Orders:  -     ergocalciferol (VITAMIN D2) 50,000 units; Take 1 capsule (50,000 Units total) by mouth once a week  -     Vitamin D 25 hydroxy; Future; Expected date: 09/01/2021    3  Borderline hyperlipidemia  Assessment & Plan:  Goal ldl 100  Check CT calcium    Orders:  -     Lipid Panel with Direct LDL reflex; Future; Expected date: 09/01/2021  -     TSH, 3rd generation with Free T4 reflex; Future; Expected date: 09/01/2021    4  Encounter for screening for cardiovascular disorders  -     CT coronary calcium score; Future; Expected date: 03/16/2021    BMI Counseling: Body mass index is 28 62 kg/m²  The BMI is above normal  Nutrition recommendations include encouraging healthy choices of fruits and vegetables  Exercise recommendations include exercising 3-5 times per week  No pharmacotherapy was ordered  There are no Patient Instructions on file for this visit  No follow-ups on file  Subjective:      Patient ID: Preston Ivan is a 36 y o  male  Chief Complaint   Patient presents with    Follow-up     Patient here to discuss lab test results        Here for follow up  Labs reviewed  Doesn't eat poorly, exercising  Family history with elevated cholesterol    Hyperlipidemia  This is a chronic problem  The current episode started more than 1 year ago  The problem is uncontrolled  Recent lipid tests were reviewed and are high  Exacerbating diseases include hypothyroidism (mild)  There are no known factors aggravating his hyperlipidemia  Current antihyperlipidemic treatment includes diet change and exercise  The current treatment provides moderate improvement of lipids  There are no compliance problems          The following portions of the patient's [de-identified] : 68M referred by Dr. Zach Plasencia at [facility] who presented him with large bowel obstruction and septic shock with gram negative bacteremia on 9/10/22. Dr. Plasencia performed life-saving emergent subtotal colectomy and was left in discontinuity, Required several units of blood. Vasopressors.  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 He is on eliquis for this. \par \par Currently he has recovered well and is tolerating a diet. Has gained one pound, after losing about 16-20 pounds. \par He met with Dr. Gillian Coronado from medical oncology. \par \par CT  Chest abdomen pelvis 9/19/22: no visible disease. Thromboses as above\par CT abdomen/ pelvis 10/11/22: resolving pelvic fluid. No visible disease \par \par Social: Lives with his wife. No smoking or alcohol.  history were reviewed and updated as appropriate: allergies, current medications, past family history, past medical history, past social history, past surgical history and problem list     Review of Systems   Constitutional: Negative  HENT: Negative  Eyes: Negative  Respiratory: Negative  Cardiovascular: Negative  Gastrointestinal: Negative  Endocrine: Negative  Genitourinary: Negative  Musculoskeletal: Negative  Skin: Negative  Allergic/Immunologic: Negative  Neurological: Negative  Hematological: Negative  Psychiatric/Behavioral: Negative  Current Outpatient Medications   Medication Sig Dispense Refill    amLODIPine-benazepril (LOTREL) 10-20 MG per capsule TAKE 1 CAPSULE BY MOUTH EVERY DAY 90 capsule 3    citalopram (CeleXA) 20 mg tablet TAKE 1 TABLET BY MOUTH EVERY DAY 90 tablet 3    LORazepam (ATIVAN) 0 5 mg tablet TAKE 1 TABLET BY MOUTH EVERYDAY AT BEDTIME 30 tablet 1    ergocalciferol (VITAMIN D2) 50,000 units Take 1 capsule (50,000 Units total) by mouth once a week 12 capsule 3    triamcinolone (KENALOG) 0 1 % cream        No current facility-administered medications for this visit  Objective:    /80   Pulse 58   Temp 97 5 °F (36 4 °C)   Resp 12   Ht 5' 7 95" (1 726 m)   Wt 85 3 kg (188 lb)   SpO2 98%   BMI 28 62 kg/m²        Physical Exam  Vitals signs and nursing note reviewed  Constitutional:       Appearance: Normal appearance  HENT:      Head: Normocephalic and atraumatic  Eyes:      Extraocular Movements: Extraocular movements intact  Pupils: Pupils are equal, round, and reactive to light  Neck:      Musculoskeletal: Normal range of motion and neck supple  Cardiovascular:      Rate and Rhythm: Normal rate and regular rhythm  Pulses: Normal pulses  Heart sounds: Normal heart sounds  Pulmonary:      Effort: Pulmonary effort is normal       Breath sounds: Normal breath sounds     Abdominal:      General: Abdomen is flat  Palpations: Abdomen is soft  Musculoskeletal: Normal range of motion  Skin:     General: Skin is warm  Capillary Refill: Capillary refill takes less than 2 seconds  Neurological:      General: No focal deficit present  Mental Status: He is alert and oriented to person, place, and time     Psychiatric:         Mood and Affect: Mood normal          Behavior: Behavior normal                 Afshan Becerra DO [employer]ot[employer] Pregnancy And Lactation Text: This medication is Pregnancy Category X and should not be given to women who are pregnant or may become pregnant. This medication should not be used if you are breast feeding.

## 2023-05-09 ENCOUNTER — OFFICE VISIT (OUTPATIENT)
Dept: FAMILY MEDICINE CLINIC | Facility: CLINIC | Age: 43
End: 2023-05-09

## 2023-05-09 VITALS
DIASTOLIC BLOOD PRESSURE: 70 MMHG | WEIGHT: 185.6 LBS | HEART RATE: 51 BPM | TEMPERATURE: 97.1 F | BODY MASS INDEX: 28.13 KG/M2 | SYSTOLIC BLOOD PRESSURE: 104 MMHG | RESPIRATION RATE: 14 BRPM | HEIGHT: 68 IN | OXYGEN SATURATION: 98 %

## 2023-05-09 DIAGNOSIS — F41.9 ANXIETY: ICD-10-CM

## 2023-05-09 DIAGNOSIS — E03.8 SUBCLINICAL HYPOTHYROIDISM: Primary | ICD-10-CM

## 2023-05-09 DIAGNOSIS — E78.5 BORDERLINE HYPERLIPIDEMIA: ICD-10-CM

## 2023-05-09 DIAGNOSIS — E55.9 VITAMIN D DEFICIENCY: ICD-10-CM

## 2023-05-09 DIAGNOSIS — I10 BENIGN ESSENTIAL HYPERTENSION: ICD-10-CM

## 2023-05-09 PROBLEM — R19.4 CHANGE IN BOWEL HABITS: Status: RESOLVED | Noted: 2021-10-18 | Resolved: 2023-05-09

## 2023-05-09 PROBLEM — K21.9 CHRONIC GERD: Status: RESOLVED | Noted: 2017-05-25 | Resolved: 2023-05-09

## 2023-05-09 PROBLEM — Z91.018 FOOD ALLERGY: Status: RESOLVED | Noted: 2022-03-23 | Resolved: 2023-05-09

## 2023-05-09 PROBLEM — Z00.00 ANNUAL PHYSICAL EXAM: Status: RESOLVED | Noted: 2020-12-07 | Resolved: 2023-05-09

## 2023-05-09 PROBLEM — J30.89 ALLERGIC RHINITIS DUE TO DUST MITE: Status: RESOLVED | Noted: 2022-03-29 | Resolved: 2023-05-09

## 2023-05-09 PROBLEM — Z00.00 WELL ADULT EXAM: Status: RESOLVED | Noted: 2018-05-10 | Resolved: 2023-05-09

## 2023-05-09 NOTE — PROGRESS NOTES
Assessment/Plan:    1  Subclinical hypothyroidism  Assessment & Plan:  Check tsh    Orders:  -     TSH, 3rd generation with Free T4 reflex; Future; Expected date: 06/01/2023  -     Vitamin D 25 hydroxy; Future; Expected date: 06/01/2023    2  Benign essential hypertension  Assessment & Plan:  Cont current meds    Orders:  -     CBC; Future; Expected date: 06/01/2023  -     Comprehensive metabolic panel; Future; Expected date: 06/01/2023    3  Vitamin D deficiency  Assessment & Plan:  Check levels    Orders:  -     Vitamin D 25 hydroxy; Future; Expected date: 06/01/2023    4  Borderline hyperlipidemia  -     Lipid Panel with Direct LDL reflex; Future; Expected date: 06/01/2023    5  Anxiety  Assessment & Plan:  Wean off citalopram  Start zoloft    Orders:  -     sertraline (ZOLOFT) 50 mg tablet; Take 1 tablet (50 mg total) by mouth daily Weaning off citalopram            There are no Patient Instructions on file for this visit  Return in about 4 months (around 9/9/2023) for Annual physical     Subjective:      Patient ID: Dejon Aquino is a 43 y o  male  Chief Complaint   Patient presents with   • Follow-up     Patient here for 6 month follow up        Here for follow up  Did see cardiology-Dr Hillard Skiff- cholesterol meds increased to 20mg but hasn't started  Mental health space not great  Recent promotion, travel to [place] like work/life balance sucks  Doesn't feel citalopram is working, feels navarro      The following portions of the patient's history were reviewed and updated as appropriate: allergies, current medications, past family history, past medical history, past social history, past surgical history and problem list     Review of Systems   Constitutional: Negative  HENT: Negative  Eyes: Negative  Respiratory: Negative  Cardiovascular: Negative  Gastrointestinal: Negative  Endocrine: Negative  Genitourinary: Negative  Allergic/Immunologic: Negative      Neurological: "Negative  Psychiatric/Behavioral: The patient is nervous/anxious  Current Outpatient Medications   Medication Sig Dispense Refill   • amLODIPine-benazepril (LOTREL) 10-20 MG per capsule Take 1 capsule by mouth daily 90 capsule 3   • LORazepam (ATIVAN) 0 5 mg tablet Take 1 tablet (0 5 mg total) by mouth daily at bedtime 30 tablet 3   • rosuvastatin (CRESTOR) 20 MG tablet Take 1 tablet (20 mg total) by mouth daily 90 tablet 3   • sertraline (ZOLOFT) 50 mg tablet Take 1 tablet (50 mg total) by mouth daily Weaning off citalopram 30 tablet 5     No current facility-administered medications for this visit  Objective:    /70 (BP Location: Left arm, Patient Position: Sitting, Cuff Size: Standard)   Pulse (!) 51   Temp (!) 97 1 °F (36 2 °C) (Tympanic)   Resp 14   Ht 5' 8\" (1 727 m)   Wt 84 2 kg (185 lb 9 6 oz)   SpO2 98%   BMI 28 22 kg/m²        Physical Exam  Vitals and nursing note reviewed  Constitutional:       Appearance: Normal appearance  He is well-developed  HENT:      Head: Normocephalic and atraumatic  Right Ear: External ear normal       Left Ear: External ear normal       Nose: Nose normal    Eyes:      General: Lids are normal       Conjunctiva/sclera: Conjunctivae normal       Pupils: Pupils are equal, round, and reactive to light  Cardiovascular:      Rate and Rhythm: Normal rate and regular rhythm  Pulses: Normal pulses  Heart sounds: Normal heart sounds, S1 normal and S2 normal    Pulmonary:      Effort: Pulmonary effort is normal       Breath sounds: Normal breath sounds  Abdominal:      General: Bowel sounds are normal       Palpations: Abdomen is soft  Musculoskeletal:         General: Normal range of motion  Cervical back: Normal range of motion and neck supple  Skin:     General: Skin is warm and dry  Neurological:      Mental Status: He is alert and oriented to person, place, and time        Deep Tendon Reflexes: Reflexes are normal and " symmetric  Psychiatric:         Speech: Speech normal          Behavior: Behavior normal          Thought Content:  Thought content normal          Judgment: Judgment normal                 Tuan Fall DO

## 2023-06-02 DIAGNOSIS — F41.9 ANXIETY: ICD-10-CM

## 2023-08-15 DIAGNOSIS — F41.9 ANXIETY: Primary | ICD-10-CM

## 2023-08-15 RX ORDER — FLUOXETINE 20 MG/1
20 TABLET, FILM COATED ORAL DAILY
Qty: 30 TABLET | Refills: 5 | Status: SHIPPED | OUTPATIENT
Start: 2023-08-15 | End: 2023-09-06

## 2023-08-15 RX ORDER — SUCRALFATE 1 G/1
1 TABLET ORAL 4 TIMES DAILY
COMMUNITY
Start: 2023-06-16 | End: 2023-09-06

## 2023-08-15 RX ORDER — FAMOTIDINE 40 MG/1
40 TABLET, FILM COATED ORAL 2 TIMES DAILY
COMMUNITY
Start: 2023-06-16 | End: 2023-09-06

## 2023-08-22 ENCOUNTER — HOSPITAL ENCOUNTER (EMERGENCY)
Facility: HOSPITAL | Age: 43
Discharge: HOME | End: 2023-08-22
Attending: STUDENT IN AN ORGANIZED HEALTH CARE EDUCATION/TRAINING PROGRAM

## 2023-08-22 VITALS
DIASTOLIC BLOOD PRESSURE: 85 MMHG | WEIGHT: 165 LBS | BODY MASS INDEX: 22.35 KG/M2 | RESPIRATION RATE: 18 BRPM | HEIGHT: 72 IN | OXYGEN SATURATION: 97 % | SYSTOLIC BLOOD PRESSURE: 123 MMHG | HEART RATE: 65 BPM | TEMPERATURE: 98 F

## 2023-08-22 DIAGNOSIS — S60.021S: ICD-10-CM

## 2023-08-22 DIAGNOSIS — S40.019S: Primary | ICD-10-CM

## 2023-08-22 DIAGNOSIS — M79.10 SORE MUSCLES: ICD-10-CM

## 2023-08-22 PROCEDURE — 99283 EMERGENCY DEPT VISIT LOW MDM: CPT

## 2023-08-22 RX ORDER — METHOCARBAMOL 750 MG/1
750 TABLET, FILM COATED ORAL 4 TIMES DAILY PRN
Qty: 40 TABLET | Refills: 0 | Status: SHIPPED | OUTPATIENT
Start: 2023-08-22 | End: 2023-09-01

## 2023-08-22 ASSESSMENT — PAIN SCALES - GENERAL: PAINLEVEL_OUTOF10: 5

## 2023-08-22 ASSESSMENT — LIFESTYLE VARIABLES
HOW OFTEN DO YOU HAVE A DRINK CONTAINING ALCOHOL: NEVER
HOW MANY STANDARD DRINKS CONTAINING ALCOHOL DO YOU HAVE ON A TYPICAL DAY: PATIENT DOES NOT DRINK

## 2023-08-22 ASSESSMENT — PAIN - FUNCTIONAL ASSESSMENT: PAIN_FUNCTIONAL_ASSESSMENT: 0-10

## 2023-08-22 NOTE — DISCHARGE INSTRUCTIONS
We strongly advise you to establish care with a primary care provider and follow-up with them for reassessment. He may benefit from physical therapy. We have prescribed you a muscle relaxer as well. Please return to the emergency department any further concerns.
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
## 2023-09-02 ENCOUNTER — APPOINTMENT (OUTPATIENT)
Dept: LAB | Facility: CLINIC | Age: 43
End: 2023-09-02
Payer: COMMERCIAL

## 2023-09-02 DIAGNOSIS — I10 BENIGN ESSENTIAL HYPERTENSION: ICD-10-CM

## 2023-09-02 DIAGNOSIS — E03.8 SUBCLINICAL HYPOTHYROIDISM: ICD-10-CM

## 2023-09-02 DIAGNOSIS — E55.9 VITAMIN D DEFICIENCY: ICD-10-CM

## 2023-09-02 DIAGNOSIS — E78.5 BORDERLINE HYPERLIPIDEMIA: ICD-10-CM

## 2023-09-02 LAB
25(OH)D3 SERPL-MCNC: 31.1 NG/ML (ref 30–100)
ALBUMIN SERPL BCP-MCNC: 4.4 G/DL (ref 3.5–5)
ALP SERPL-CCNC: 54 U/L (ref 34–104)
ALT SERPL W P-5'-P-CCNC: 24 U/L (ref 7–52)
ANION GAP SERPL CALCULATED.3IONS-SCNC: 6 MMOL/L
AST SERPL W P-5'-P-CCNC: 20 U/L (ref 13–39)
BILIRUB SERPL-MCNC: 0.81 MG/DL (ref 0.2–1)
BUN SERPL-MCNC: 17 MG/DL (ref 5–25)
CALCIUM SERPL-MCNC: 9.4 MG/DL (ref 8.4–10.2)
CHLORIDE SERPL-SCNC: 102 MMOL/L (ref 96–108)
CHOLEST SERPL-MCNC: 158 MG/DL
CO2 SERPL-SCNC: 30 MMOL/L (ref 21–32)
CREAT SERPL-MCNC: 0.88 MG/DL (ref 0.6–1.3)
ERYTHROCYTE [DISTWIDTH] IN BLOOD BY AUTOMATED COUNT: 11.8 % (ref 11.6–15.1)
GFR SERPL CREATININE-BSD FRML MDRD: 105 ML/MIN/1.73SQ M
GLUCOSE P FAST SERPL-MCNC: 92 MG/DL (ref 65–99)
HCT VFR BLD AUTO: 43.9 % (ref 36.5–49.3)
HDLC SERPL-MCNC: 45 MG/DL
HGB BLD-MCNC: 15 G/DL (ref 12–17)
LDLC SERPL CALC-MCNC: 93 MG/DL (ref 0–100)
MCH RBC QN AUTO: 29.7 PG (ref 26.8–34.3)
MCHC RBC AUTO-ENTMCNC: 34.2 G/DL (ref 31.4–37.4)
MCV RBC AUTO: 87 FL (ref 82–98)
PLATELET # BLD AUTO: 271 THOUSANDS/UL (ref 149–390)
PMV BLD AUTO: 9 FL (ref 8.9–12.7)
POTASSIUM SERPL-SCNC: 4.2 MMOL/L (ref 3.5–5.3)
PROT SERPL-MCNC: 7.2 G/DL (ref 6.4–8.4)
RBC # BLD AUTO: 5.05 MILLION/UL (ref 3.88–5.62)
SODIUM SERPL-SCNC: 138 MMOL/L (ref 135–147)
T4 FREE SERPL-MCNC: 0.75 NG/DL (ref 0.61–1.12)
TRIGL SERPL-MCNC: 101 MG/DL
TSH SERPL DL<=0.05 MIU/L-ACNC: 4.53 UIU/ML (ref 0.45–4.5)
WBC # BLD AUTO: 5.67 THOUSAND/UL (ref 4.31–10.16)

## 2023-09-02 PROCEDURE — 84443 ASSAY THYROID STIM HORMONE: CPT

## 2023-09-02 PROCEDURE — 84439 ASSAY OF FREE THYROXINE: CPT

## 2023-09-02 PROCEDURE — 82306 VITAMIN D 25 HYDROXY: CPT

## 2023-09-02 PROCEDURE — 36415 COLL VENOUS BLD VENIPUNCTURE: CPT

## 2023-09-02 PROCEDURE — 80061 LIPID PANEL: CPT

## 2023-09-02 PROCEDURE — 80053 COMPREHEN METABOLIC PANEL: CPT

## 2023-09-02 PROCEDURE — 85027 COMPLETE CBC AUTOMATED: CPT

## 2023-09-06 ENCOUNTER — OFFICE VISIT (OUTPATIENT)
Dept: FAMILY MEDICINE CLINIC | Facility: CLINIC | Age: 43
End: 2023-09-06
Payer: COMMERCIAL

## 2023-09-06 VITALS
WEIGHT: 185.6 LBS | DIASTOLIC BLOOD PRESSURE: 78 MMHG | HEART RATE: 82 BPM | BODY MASS INDEX: 28.13 KG/M2 | SYSTOLIC BLOOD PRESSURE: 140 MMHG | HEIGHT: 68 IN | OXYGEN SATURATION: 98 % | TEMPERATURE: 97.2 F | RESPIRATION RATE: 18 BRPM

## 2023-09-06 DIAGNOSIS — E55.9 VITAMIN D DEFICIENCY: ICD-10-CM

## 2023-09-06 DIAGNOSIS — Z00.00 ANNUAL PHYSICAL EXAM: Primary | ICD-10-CM

## 2023-09-06 DIAGNOSIS — F41.9 ANXIETY: ICD-10-CM

## 2023-09-06 DIAGNOSIS — E03.8 SUBCLINICAL HYPOTHYROIDISM: ICD-10-CM

## 2023-09-06 DIAGNOSIS — I10 BENIGN ESSENTIAL HYPERTENSION: ICD-10-CM

## 2023-09-06 DIAGNOSIS — E78.5 BORDERLINE HYPERLIPIDEMIA: ICD-10-CM

## 2023-09-06 PROBLEM — H35.61 RETINAL HEMORRHAGE OF RIGHT EYE: Status: RESOLVED | Noted: 2023-01-04 | Resolved: 2023-09-06

## 2023-09-06 PROCEDURE — 99396 PREV VISIT EST AGE 40-64: CPT | Performed by: FAMILY MEDICINE

## 2023-09-06 RX ORDER — ERGOCALCIFEROL 1.25 MG/1
50000 CAPSULE ORAL WEEKLY
Qty: 12 CAPSULE | Refills: 3 | Status: SHIPPED | OUTPATIENT
Start: 2023-09-06

## 2023-09-06 NOTE — PROGRESS NOTES
[facility]    NAME: Judye Cooks  AGE: 37 y.o. SEX: male  : 1980     DATE: 2023     Assessment and Plan:     Problem List Items Addressed This Visit        Endocrine    Subclinical hypothyroidism    Relevant Orders    TSH, 3rd generation with Free T4 reflex       Cardiovascular and Mediastinum    Benign essential hypertension    Relevant Orders    CBC    Comprehensive metabolic panel       Other    Anxiety    Relevant Medications    sertraline (ZOLOFT) 50 mg tablet    Borderline hyperlipidemia    Relevant Orders    Lipid Panel with Direct LDL reflex    Vitamin D deficiency    Relevant Medications    ergocalciferol (VITAMIN D2) 50,000 units    Other Relevant Orders    Vitamin D 25 hydroxy    Annual physical exam - Primary     Will flu shot            Immunizations and preventive care screenings were discussed with patient today. Appropriate education was printed on patient's after visit summary. Counseling:  Dental Health: discussed importance of regular tooth brushing, flossing, and dental visits. Depression Screening and Follow-up Plan: Patient was screened for depression during today's encounter. They screened negative with a PHQ-2 score of 0. Return in 6 months (on 3/6/2024) for Recheck. Chief Complaint:     Chief Complaint   Patient presents with   • Physical Exam     Patient being seen for Physical Exam      History of Present Illness:     Adult Annual Physical   Patient here for a comprehensive physical exam. The patient reports problems - didn't tolerate prozac. Diet and Physical Activity  Diet/Nutrition: well balanced diet and frequent junk food. Exercise: 3-4 times a week on average.       Depression Screening  PHQ-2/9 Depression Screening    Little interest or pleasure in doing things: 0 - not at all  Feeling down, depressed, or hopeless: 0 - not at all  PHQ-2 Score: 0  PHQ-2 Interpretation: Negative depression screen       General Health  Sleep: sleeps well. Hearing: normal - bilateral.  Vision: no vision problems. Dental: regular dental visits.  Health  Symptoms include: none     Review of Systems:     Review of Systems   Constitutional: Negative. HENT: Negative. Eyes: Negative. Respiratory: Negative. Cardiovascular: Negative. Gastrointestinal: Negative. Endocrine: Negative. Genitourinary: Negative. Musculoskeletal: Negative. Skin: Negative. Allergic/Immunologic: Negative. Neurological: Negative. Hematological: Negative. Psychiatric/Behavioral: The patient is nervous/anxious.        Past Medical History:     Past Medical History:   Diagnosis Date   • Acute non-recurrent maxillary sinusitis 2/4/2020   • Acute swimmer's ear of left side 2/11/2020   • Allergic    • Anxiety    • GERD (gastroesophageal reflux disease)    • Hyperlipidemia    • Hypertension    • Impacted cerumen, right ear 2/11/2020   • Retinal hemorrhage of right eye 1/4/2023      Past Surgical History:     Past Surgical History:   Procedure Laterality Date   • TONSILECTOMY AND ADNOIDECTOMY     • TONSILLECTOMY     • UPPER GASTROINTESTINAL ENDOSCOPY        Family History:     Family History   Problem Relation Age of Onset   • Heart disease Father    • Diabetes Father    • Hypertension Father    • Hypertension Brother    • Colon cancer Maternal Uncle    • Breast cancer Paternal Grandmother    • Heart disease Paternal Grandfather    • Other Paternal Grandfather         Acute myocardial infarction       Social History:     Social History     Socioeconomic History   • Marital status: /Civil Union     Spouse name: None   • Number of children: None   • Years of education: None   • Highest education level: None   Occupational History   • None   Tobacco Use   • Smoking status: Never   • Smokeless tobacco: Never   Vaping Use   • Vaping Use: Never used   Substance and Sexual Activity   • Alcohol use: Yes     Comment: 1 per week   • Drug use: No   • Sexual activity: Yes   Other Topics Concern   • None   Social History Narrative    Daily coffee consumption (2 cups/day)        Who lives in your home: wife and 2 children    What type of home do you live in: Single house    Age of your home: 12 years    How long have you been living there: 10 years    Type of heat: Forced hot air    Type of fuel: Gas    What type of dalton is in your bedroom: Carpet    Do you have the following in or near your home:    Air products: Central air    Pests: None    Pets: Dog    Are pets allowed in bedroom: Yes    Open fields, wooded areas nearby: Open fields and Wooded areas    Basement: Finished    Exposure to second hand smoke: No        Habits:    Caffeine: yes approx 2 cups per day    Chocolate: Other:     Social Determinants of Health     Financial Resource Strain: Not on file   Food Insecurity: Not on file   Transportation Needs: Not on file   Physical Activity: Not on file   Stress: Not on file   Social Connections: Not on file   Intimate Partner Violence: Not on file   Housing Stability: Not on file      Current Medications:     Current Outpatient Medications   Medication Sig Dispense Refill   • amLODIPine-benazepril (LOTREL) 10-20 MG per capsule Take 1 capsule by mouth daily 90 capsule 3   • ergocalciferol (VITAMIN D2) 50,000 units Take 1 capsule (50,000 Units total) by mouth once a week 12 capsule 3   • LORazepam (ATIVAN) 0.5 mg tablet Take 1 tablet (0.5 mg total) by mouth daily at bedtime 30 tablet 5   • rosuvastatin (CRESTOR) 20 MG tablet Take 1 tablet (20 mg total) by mouth daily 90 tablet 3   • sertraline (ZOLOFT) 50 mg tablet Take 1.5 tablets (75 mg total) by mouth daily 135 tablet 3     No current facility-administered medications for this visit. Allergies:      Allergies   Allergen Reactions   • Ceclor [Cefaclor]    • Pollen Extract       Physical Exam:     /78 (BP Location: Left arm, Patient Position: Sitting, Cuff Size: Standard)   Pulse 82   Temp (!) 97.2 °F (36.2 °C) (Tympanic)   Resp 18   Ht 5' 8" (1.727 m)   Wt 84.2 kg (185 lb 9.6 oz)   SpO2 98%   BMI 28.22 kg/m²     Physical Exam  Vitals and nursing note reviewed. Constitutional:       Appearance: Normal appearance. He is well-developed. HENT:      Head: Normocephalic and atraumatic. Right Ear: External ear normal.      Left Ear: External ear normal.      Nose: Nose normal.   Eyes:      Extraocular Movements: Extraocular movements intact. Conjunctiva/sclera: Conjunctivae normal.      Pupils: Pupils are equal, round, and reactive to light. Cardiovascular:      Rate and Rhythm: Normal rate and regular rhythm. Heart sounds: Normal heart sounds. Pulmonary:      Effort: Pulmonary effort is normal.      Breath sounds: Normal breath sounds. Abdominal:      General: Abdomen is flat. Bowel sounds are normal.      Palpations: Abdomen is soft. Musculoskeletal:         General: Normal range of motion. Cervical back: Normal range of motion and neck supple. Skin:     General: Skin is warm and dry. Capillary Refill: Capillary refill takes less than 2 seconds. Neurological:      General: No focal deficit present. Mental Status: He is alert and oriented to person, place, and time. Psychiatric:         Mood and Affect: Mood normal.         Behavior: Behavior normal.         Thought Content:  Thought content normal.         Judgment: Judgment normal.          Franci Acosta DO  [facility]

## 2023-10-14 ENCOUNTER — IMMUNIZATIONS (OUTPATIENT)
Dept: FAMILY MEDICINE CLINIC | Facility: CLINIC | Age: 43
End: 2023-10-14
Payer: COMMERCIAL

## 2023-10-14 DIAGNOSIS — Z23 ENCOUNTER FOR IMMUNIZATION: Primary | ICD-10-CM

## 2023-10-14 PROCEDURE — 90686 IIV4 VACC NO PRSV 0.5 ML IM: CPT

## 2023-10-14 PROCEDURE — 90471 IMMUNIZATION ADMIN: CPT

## 2023-11-01 DIAGNOSIS — E03.8 SUBCLINICAL HYPOTHYROIDISM: Primary | ICD-10-CM

## 2023-11-11 ENCOUNTER — APPOINTMENT (OUTPATIENT)
Dept: LAB | Facility: CLINIC | Age: 43
End: 2023-11-11
Payer: COMMERCIAL

## 2023-11-11 DIAGNOSIS — E03.8 SUBCLINICAL HYPOTHYROIDISM: ICD-10-CM

## 2023-11-11 LAB — TSH SERPL DL<=0.05 MIU/L-ACNC: 3.86 UIU/ML (ref 0.45–4.5)

## 2023-11-11 PROCEDURE — 36415 COLL VENOUS BLD VENIPUNCTURE: CPT

## 2023-11-11 PROCEDURE — 84443 ASSAY THYROID STIM HORMONE: CPT

## 2023-11-28 DIAGNOSIS — F41.1 GENERALIZED ANXIETY DISORDER: ICD-10-CM

## 2023-11-28 RX ORDER — CITALOPRAM 20 MG/1
20 TABLET ORAL DAILY
Qty: 90 TABLET | Refills: 3 | Status: SHIPPED | OUTPATIENT
Start: 2023-11-28

## 2024-01-16 ENCOUNTER — TELEPHONE (OUTPATIENT)
Dept: CARDIOLOGY CLINIC | Facility: MEDICAL CENTER | Age: 44
End: 2024-01-16

## 2024-01-30 DIAGNOSIS — F41.9 ANXIETY: ICD-10-CM

## 2024-01-30 RX ORDER — LORAZEPAM 0.5 MG/1
0.5 TABLET ORAL
Qty: 30 TABLET | Refills: 3 | Status: SHIPPED | OUTPATIENT
Start: 2024-01-30

## 2024-01-30 NOTE — TELEPHONE ENCOUNTER
Medication:  PDMP     12/10/2023 07/07/2023 LORazepam (Tablet) 30.0 30 0.5 MG GONZALES LUIS     Active agreement on file -No

## 2024-02-04 DIAGNOSIS — E03.8 SUBCLINICAL HYPOTHYROIDISM: ICD-10-CM

## 2024-02-04 DIAGNOSIS — Z00.00 ENCOUNTER FOR PREVENTIVE HEALTH EXAMINATION: Primary | ICD-10-CM

## 2024-02-04 DIAGNOSIS — E78.2 MIXED HYPERLIPIDEMIA: ICD-10-CM

## 2024-02-04 DIAGNOSIS — F41.9 ANXIETY: ICD-10-CM

## 2024-02-04 DIAGNOSIS — I10 BENIGN ESSENTIAL HYPERTENSION: Primary | ICD-10-CM

## 2024-02-20 ENCOUNTER — HOSPITAL ENCOUNTER (OUTPATIENT)
Dept: CT IMAGING | Facility: HOSPITAL | Age: 44
Discharge: HOME | End: 2024-02-20

## 2024-02-20 ENCOUNTER — HOSPITAL ENCOUNTER (OUTPATIENT)
Dept: ULTRASOUND IMAGING | Facility: HOSPITAL | Age: 44
Discharge: HOME | End: 2024-02-20

## 2024-02-20 ENCOUNTER — HOSPITAL ENCOUNTER (OUTPATIENT)
Dept: NON INVASIVE DIAGNOSTICS | Facility: CLINIC | Age: 44
Discharge: HOME | End: 2024-02-20

## 2024-02-20 ENCOUNTER — HOSPITAL ENCOUNTER (OUTPATIENT)
Dept: VASCULAR ULTRASOUND | Facility: HOSPITAL | Age: 44
Discharge: HOME | End: 2024-02-20

## 2024-02-20 DIAGNOSIS — Z00.00 ENCOUNTER FOR PREVENTIVE HEALTH EXAMINATION: ICD-10-CM

## 2024-02-21 PROBLEM — Z00.00 WELL ADULT EXAM: Status: RESOLVED | Noted: 2018-05-10 | Resolved: 2024-02-21

## 2024-02-26 DIAGNOSIS — I10 ESSENTIAL HYPERTENSION: ICD-10-CM

## 2024-02-26 NOTE — TELEPHONE ENCOUNTER
Patients wife called to request a refill for their amLODIPine-benazepril (LOTREL) 10-20 MG per capsule.advised a refill was requested on 02/26/2024 and is pending approval. Patient verbalized understanding and is in agreement.

## 2024-02-27 RX ORDER — AMLODIPINE BESYLATE AND BENAZEPRIL HYDROCHLORIDE 10; 20 MG/1; MG/1
1 CAPSULE ORAL DAILY
Qty: 90 CAPSULE | Refills: 1 | Status: SHIPPED | OUTPATIENT
Start: 2024-02-27

## 2024-03-06 ENCOUNTER — OFFICE VISIT (OUTPATIENT)
Dept: FAMILY MEDICINE CLINIC | Facility: CLINIC | Age: 44
End: 2024-03-06
Payer: COMMERCIAL

## 2024-03-06 VITALS
HEART RATE: 85 BPM | RESPIRATION RATE: 16 BRPM | OXYGEN SATURATION: 98 % | SYSTOLIC BLOOD PRESSURE: 148 MMHG | HEIGHT: 68 IN | BODY MASS INDEX: 29.43 KG/M2 | DIASTOLIC BLOOD PRESSURE: 82 MMHG | TEMPERATURE: 97.4 F | WEIGHT: 194.2 LBS

## 2024-03-06 DIAGNOSIS — E55.9 VITAMIN D DEFICIENCY: ICD-10-CM

## 2024-03-06 DIAGNOSIS — E03.8 SUBCLINICAL HYPOTHYROIDISM: Primary | ICD-10-CM

## 2024-03-06 DIAGNOSIS — E78.2 MIXED HYPERLIPIDEMIA: ICD-10-CM

## 2024-03-06 DIAGNOSIS — I10 BENIGN ESSENTIAL HYPERTENSION: ICD-10-CM

## 2024-03-06 PROCEDURE — 99214 OFFICE O/P EST MOD 30 MIN: CPT | Performed by: FAMILY MEDICINE

## 2024-03-06 NOTE — PROGRESS NOTES
Assessment/Plan:    1. Subclinical hypothyroidism  Assessment & Plan:  Check levels    Orders:  -     US thyroid; Future; Expected date: 03/06/2024    2. Benign essential hypertension  Assessment & Plan:  Lower at home      3. Mixed hyperlipidemia  Assessment & Plan:  Check labs      4. Vitamin D deficiency  Assessment & Plan:  Cont vitamin d          Depression Screening and Follow-up Plan: Patient was screened for depression during today's encounter. They screened negative with a PHQ-2 score of 0.         There are no Patient Instructions on file for this visit.    Return in about 6 months (around 9/6/2024) for Annual physical.    Subjective:      Patient ID: Emerson Joyce is a 43 y.o. male.    Chief Complaint   Patient presents with   • Follow-up     Patient is being seen for 6 month follow up.        Here for follow up  Gained weight  9 pounds  Lightheaded every now and then  Having [facility] in May  Energy is down    Hypertension  This is a chronic problem. The current episode started more than 1 year ago. The problem is controlled. There are no associated agents to hypertension. The current treatment provides moderate improvement. There are no compliance problems.        The following portions of the patient's history were reviewed and updated as appropriate: allergies, current medications, past family history, past medical history, past social history, past surgical history and problem list.    Review of Systems   Constitutional:  Positive for fatigue.   Eyes: Negative.    Respiratory: Negative.     Cardiovascular: Negative.    Gastrointestinal: Negative.    Endocrine: Negative.    Genitourinary: Negative.    Musculoskeletal: Negative.    Neurological:  Positive for light-headedness.         Current Outpatient Medications   Medication Sig Dispense Refill   • amLODIPine-benazepril (LOTREL) 10-20 MG per capsule TAKE 1 CAPSULE BY MOUTH EVERY DAY 90 capsule 1   • citalopram (CeleXA) 20 mg tablet Take 1 tablet  "(20 mg total) by mouth daily 90 tablet 3   • ergocalciferol (VITAMIN D2) 50,000 units Take 1 capsule (50,000 Units total) by mouth once a week 12 capsule 3   • LORazepam (ATIVAN) 0.5 mg tablet TAKE 1 TABLET BY MOUTH DAILY AT BEDTIME 30 tablet 3   • rosuvastatin (CRESTOR) 20 MG tablet Take 1 tablet (20 mg total) by mouth daily 90 tablet 3     No current facility-administered medications for this visit.       Objective:    /82 (BP Location: Left arm, Patient Position: Sitting, Cuff Size: Standard)   Pulse 85   Temp (!) 97.4 °F (36.3 °C) (Tympanic)   Resp 16   Ht 5' 8\" (1.727 m)   Wt 88.1 kg (194 lb 3.2 oz)   SpO2 98%   BMI 29.53 kg/m²        Physical Exam  Vitals and nursing note reviewed.   Constitutional:       Appearance: Normal appearance. He is well-developed.   HENT:      Head: Normocephalic and atraumatic.      Right Ear: External ear normal.      Left Ear: External ear normal.      Nose: Nose normal.   Eyes:      General: Lids are normal.      Extraocular Movements: Extraocular movements intact.      Conjunctiva/sclera: Conjunctivae normal.      Pupils: Pupils are equal, round, and reactive to light.   Cardiovascular:      Rate and Rhythm: Normal rate and regular rhythm.      Pulses: Normal pulses.      Heart sounds: Normal heart sounds, S1 normal and S2 normal.   Pulmonary:      Effort: Pulmonary effort is normal.      Breath sounds: Normal breath sounds.   Abdominal:      General: Abdomen is flat. Bowel sounds are normal.      Palpations: Abdomen is soft.   Musculoskeletal:         General: Normal range of motion.      Cervical back: Normal range of motion and neck supple.   Skin:     General: Skin is warm and dry.   Neurological:      General: No focal deficit present.      Mental Status: He is alert and oriented to person, place, and time.      Deep Tendon Reflexes: Reflexes are normal and symmetric.   Psychiatric:         Mood and Affect: Mood normal.         Speech: Speech normal.         " Behavior: Behavior normal.         Thought Content: Thought content normal.         Judgment: Judgment normal.                Marisa Pagan, DO

## 2024-03-07 ENCOUNTER — APPOINTMENT (OUTPATIENT)
Dept: LAB | Facility: CLINIC | Age: 44
End: 2024-03-07
Payer: COMMERCIAL

## 2024-03-07 DIAGNOSIS — E78.5 BORDERLINE HYPERLIPIDEMIA: ICD-10-CM

## 2024-03-07 DIAGNOSIS — I10 BENIGN ESSENTIAL HYPERTENSION: ICD-10-CM

## 2024-03-07 DIAGNOSIS — E03.8 SUBCLINICAL HYPOTHYROIDISM: ICD-10-CM

## 2024-03-07 DIAGNOSIS — E55.9 VITAMIN D DEFICIENCY: ICD-10-CM

## 2024-03-07 LAB
25(OH)D3 SERPL-MCNC: 33.2 NG/ML (ref 30–100)
ALBUMIN SERPL BCP-MCNC: 4.4 G/DL (ref 3.5–5)
ALP SERPL-CCNC: 50 U/L (ref 34–104)
ALT SERPL W P-5'-P-CCNC: 26 U/L (ref 7–52)
ANION GAP SERPL CALCULATED.3IONS-SCNC: 6 MMOL/L
AST SERPL W P-5'-P-CCNC: 19 U/L (ref 13–39)
BILIRUB SERPL-MCNC: 0.81 MG/DL (ref 0.2–1)
BUN SERPL-MCNC: 14 MG/DL (ref 5–25)
CALCIUM SERPL-MCNC: 9.8 MG/DL (ref 8.4–10.2)
CHLORIDE SERPL-SCNC: 102 MMOL/L (ref 96–108)
CHOLEST SERPL-MCNC: 166 MG/DL
CO2 SERPL-SCNC: 29 MMOL/L (ref 21–32)
CREAT SERPL-MCNC: 1.02 MG/DL (ref 0.6–1.3)
ERYTHROCYTE [DISTWIDTH] IN BLOOD BY AUTOMATED COUNT: 11.6 % (ref 11.6–15.1)
GFR SERPL CREATININE-BSD FRML MDRD: 89 ML/MIN/1.73SQ M
GLUCOSE P FAST SERPL-MCNC: 104 MG/DL (ref 65–99)
HCT VFR BLD AUTO: 44.3 % (ref 36.5–49.3)
HDLC SERPL-MCNC: 43 MG/DL
HGB BLD-MCNC: 15.3 G/DL (ref 12–17)
LDLC SERPL CALC-MCNC: 103 MG/DL (ref 0–100)
MCH RBC QN AUTO: 30.3 PG (ref 26.8–34.3)
MCHC RBC AUTO-ENTMCNC: 34.5 G/DL (ref 31.4–37.4)
MCV RBC AUTO: 88 FL (ref 82–98)
PLATELET # BLD AUTO: 287 THOUSANDS/UL (ref 149–390)
PMV BLD AUTO: 9 FL (ref 8.9–12.7)
POTASSIUM SERPL-SCNC: 4.2 MMOL/L (ref 3.5–5.3)
PROT SERPL-MCNC: 7.1 G/DL (ref 6.4–8.4)
RBC # BLD AUTO: 5.05 MILLION/UL (ref 3.88–5.62)
SODIUM SERPL-SCNC: 137 MMOL/L (ref 135–147)
TRIGL SERPL-MCNC: 101 MG/DL
TSH SERPL DL<=0.05 MIU/L-ACNC: 3.33 UIU/ML (ref 0.45–4.5)
WBC # BLD AUTO: 5.41 THOUSAND/UL (ref 4.31–10.16)

## 2024-03-07 PROCEDURE — 80061 LIPID PANEL: CPT

## 2024-03-07 PROCEDURE — 82306 VITAMIN D 25 HYDROXY: CPT

## 2024-03-07 PROCEDURE — 85027 COMPLETE CBC AUTOMATED: CPT

## 2024-03-07 PROCEDURE — 80053 COMPREHEN METABOLIC PANEL: CPT

## 2024-03-07 PROCEDURE — 36415 COLL VENOUS BLD VENIPUNCTURE: CPT

## 2024-03-07 PROCEDURE — 84443 ASSAY THYROID STIM HORMONE: CPT

## 2024-03-12 ENCOUNTER — OFFICE VISIT (OUTPATIENT)
Dept: CARDIOLOGY CLINIC | Facility: MEDICAL CENTER | Age: 44
End: 2024-03-12
Payer: COMMERCIAL

## 2024-03-12 VITALS
WEIGHT: 194 LBS | HEART RATE: 84 BPM | SYSTOLIC BLOOD PRESSURE: 130 MMHG | OXYGEN SATURATION: 98 % | HEIGHT: 68 IN | BODY MASS INDEX: 29.4 KG/M2 | DIASTOLIC BLOOD PRESSURE: 70 MMHG

## 2024-03-12 DIAGNOSIS — E78.2 MIXED HYPERLIPIDEMIA: Primary | ICD-10-CM

## 2024-03-12 DIAGNOSIS — E78.5 BORDERLINE HYPERLIPIDEMIA: ICD-10-CM

## 2024-03-12 DIAGNOSIS — I10 BENIGN ESSENTIAL HYPERTENSION: ICD-10-CM

## 2024-03-12 PROCEDURE — 99214 OFFICE O/P EST MOD 30 MIN: CPT | Performed by: INTERNAL MEDICINE

## 2024-03-12 RX ORDER — ROSUVASTATIN CALCIUM 10 MG/1
10 TABLET, COATED ORAL DAILY
Qty: 90 TABLET | Refills: 3 | Status: SHIPPED | OUTPATIENT
Start: 2024-03-12

## 2024-03-12 NOTE — PROGRESS NOTES
Cardiology   Emerson Joyce 43 y.o. male MRN: [id number]        Impression:  1. Hypertension - controlled.    2. R retinal hemorrhage - resolved.  3. Dyslipidemia - on statin.  Elevated CT coronary calcium score.      Recommendations:  Continue current medications  Follow up in one year.         HPI: Emerson Joyce is a 43 y.o. year old male with hypertension, R retinal hemorrhage, borderline dyslipidemia, who presents for follow up.  Feels well from a cardiac standpoint.  No chest pain, shortness of breath, or palpitations. Has been on BP medication for 5-10 years. Asymptomatic from cardiac standpoint. No chest pain, shortness of breath, or palpitations. Has elevated CT coronary calcium score in 2021, negative carotid duplex.         Review of Systems   Constitutional: Negative.    HENT: Negative.     Eyes: Negative.    Respiratory:  Negative for chest tightness and shortness of breath.    Cardiovascular:  Negative for chest pain, palpitations and leg swelling.   Gastrointestinal: Negative.    Endocrine: Negative.    Genitourinary: Negative.    Musculoskeletal: Negative.    Skin: Negative.    Allergic/Immunologic: Negative.    Neurological: Negative.    Hematological: Negative.    Psychiatric/Behavioral: Negative.     All other systems reviewed and are negative.        Past Medical History:   Diagnosis Date    Acute non-recurrent maxillary sinusitis 2/4/2020    Acute swimmer's ear of left side 2/11/2020    Allergic     Anxiety     GERD (gastroesophageal reflux disease)     Hyperlipidemia     Hypertension     Impacted cerumen, right ear 2/11/2020    Retinal hemorrhage of right eye 1/4/2023     Past Surgical History:   Procedure Laterality Date    TONSILECTOMY AND ADNOIDECTOMY      TONSILLECTOMY      UPPER GASTROINTESTINAL ENDOSCOPY       Social History     Substance and Sexual Activity   Alcohol Use Yes    Comment: 1 per week     Social History     Substance and Sexual Activity   Drug Use No     Social History      Tobacco Use   Smoking Status Never   Smokeless Tobacco Never     Family History   Problem Relation Age of Onset    Heart disease Father     Diabetes Father     Hypertension Father     Hypertension Brother     Colon cancer Maternal Uncle     Breast cancer Paternal Grandmother     Heart disease Paternal Grandfather     Other Paternal Grandfather         Acute myocardial infarction        Allergies:  Allergies   Allergen Reactions    Ceclor [Cefaclor]     Pollen Extract        Medications:     Current Outpatient Medications:     amLODIPine-benazepril (LOTREL) 10-20 MG per capsule, TAKE 1 CAPSULE BY MOUTH EVERY DAY, Disp: 90 capsule, Rfl: 1    citalopram (CeleXA) 20 mg tablet, Take 1 tablet (20 mg total) by mouth daily, Disp: 90 tablet, Rfl: 3    ergocalciferol (VITAMIN D2) 50,000 units, Take 1 capsule (50,000 Units total) by mouth once a week, Disp: 12 capsule, Rfl: 3    LORazepam (ATIVAN) 0.5 mg tablet, TAKE 1 TABLET BY MOUTH DAILY AT BEDTIME, Disp: 30 tablet, Rfl: 3    rosuvastatin (CRESTOR) 20 MG tablet, Take 1 tablet (20 mg total) by mouth daily (Patient taking differently: Take 10 mg by mouth daily), Disp: 90 tablet, Rfl: 3      Wt Readings from Last 3 Encounters:   03/12/24 88 kg (194 lb)   03/06/24 88.1 kg (194 lb 3.2 oz)   09/06/23 84.2 kg (185 lb 9.6 oz)     Temp Readings from Last 3 Encounters:   03/06/24 (!) 97.4 °F (36.3 °C) (Tympanic)   09/06/23 (!) 97.2 °F (36.2 °C) (Tympanic)   05/09/23 (!) 97.1 °F (36.2 °C) (Tympanic)     BP Readings from Last 3 Encounters:   03/12/24 120/80   03/06/24 148/82   09/06/23 140/78     Pulse Readings from Last 3 Encounters:   03/12/24 84   03/06/24 85   09/06/23 82         Physical Exam  HENT:      Head: Atraumatic.      Mouth/Throat:      Mouth: Mucous membranes are moist.   Eyes:      Extraocular Movements: Extraocular movements intact.   Cardiovascular:      Rate and Rhythm: Normal rate and regular rhythm.      Heart sounds: Normal heart sounds.   Pulmonary:       "Effort: Pulmonary effort is normal.      Breath sounds: Normal breath sounds.   Abdominal:      General: Abdomen is flat.   Musculoskeletal:         General: Normal range of motion.      Cervical back: Normal range of motion.   Skin:     General: Skin is warm.   Neurological:      General: No focal deficit present.      Mental Status: He is alert and oriented to person, place, and time.   Psychiatric:         Mood and Affect: Mood normal.         Behavior: Behavior normal.           Laboratory Studies:  CMP:  Lab Results   Component Value Date    K 4.2 03/07/2024     03/07/2024    CO2 29 03/07/2024    BUN 14 03/07/2024    CREATININE 1.02 03/07/2024    AST 19 03/07/2024    ALT 26 03/07/2024    EGFR 89 03/07/2024       Lipid Profile:   No results found for: \"CHOL\"  Lab Results   Component Value Date    HDL 43 03/07/2024     Lab Results   Component Value Date    LDLCALC 103 (H) 03/07/2024     Lab Results   Component Value Date    TRIG 101 03/07/2024               "

## 2024-03-13 ENCOUNTER — HOSPITAL ENCOUNTER (OUTPATIENT)
Dept: ULTRASOUND IMAGING | Facility: HOSPITAL | Age: 44
Discharge: HOME | End: 2024-03-13
Payer: COMMERCIAL

## 2024-03-13 DIAGNOSIS — E03.8 SUBCLINICAL HYPOTHYROIDISM: ICD-10-CM

## 2024-03-13 PROCEDURE — 76536 US EXAM OF HEAD AND NECK: CPT

## 2024-04-08 ENCOUNTER — OFFICE VISIT (OUTPATIENT)
Dept: GASTROENTEROLOGY | Facility: CLINIC | Age: 44
End: 2024-04-08
Payer: COMMERCIAL

## 2024-04-08 VITALS
TEMPERATURE: 97.3 F | SYSTOLIC BLOOD PRESSURE: 130 MMHG | WEIGHT: 188 LBS | HEIGHT: 68 IN | BODY MASS INDEX: 28.49 KG/M2 | DIASTOLIC BLOOD PRESSURE: 74 MMHG

## 2024-04-08 DIAGNOSIS — K21.9 GASTROESOPHAGEAL REFLUX DISEASE WITHOUT ESOPHAGITIS: ICD-10-CM

## 2024-04-08 DIAGNOSIS — Z80.0 FAMILY HISTORY OF COLON CANCER: Primary | ICD-10-CM

## 2024-04-08 DIAGNOSIS — K58.2 IRRITABLE BOWEL SYNDROME WITH BOTH CONSTIPATION AND DIARRHEA: ICD-10-CM

## 2024-04-08 PROCEDURE — 99204 OFFICE O/P NEW MOD 45 MIN: CPT | Performed by: INTERNAL MEDICINE

## 2024-04-08 NOTE — PATIENT INSTRUCTIONS
Scheduled date of EGD/colonoscopy (as of today): 4/29/24  Physician performing EGD/colonoscopy: Dr. Hernandez  Location of EGD/colonoscopy: [facility]  Desired bowel prep reviewed with patient: Miralax/ Dulcolax   Instructions reviewed with patient by: Dacia (in office)  Clearances:  N/A

## 2024-04-08 NOTE — PROGRESS NOTES
[facility] - Outpatient Consultation  Emerson Joyce 43 y.o. male MRN: [id number]  Encounter: [id number]          ASSESSMENT AND PLAN:      1. Family history of colon cancer  He has a family history of colon cancer.  His last colonoscopy was 3 years ago but since I will be performing a colonoscopy for diagnostic purposes I will also evaluate for polyps or colon cancer.  - Colonoscopy; Future    2. Irritable bowel syndrome with both constipation and diarrhea  His symptoms are likely due to irritable bowel syndrome with constipation predominance and after he has a bowel movement this soft and liquid stool and gas behind it, rushing out.  I encouraged him to eat a low FODMAP diet and we will schedule him for an upper endoscopy and colonoscopy to evaluate for inflammatory bowel disease, celiac sprue, and microscopic colitis.  I also encouraged him to try MiraLAX daily and then adjust the dose as needed.  - Colonoscopy; Future  - EGD; Future    3. Gastroesophageal reflux disease without esophagitis  He has mild reflux symptoms and should continue diet and lifestyle modification.  - EGD; Future    ______________________________________________________________________    HPI: He presents for evaluation because of abdominal bloating associated with alternating constipation and diarrhea.  He said typically it begins as mild constipation and then when he finally takes a Dulcolax he will have a bowel movement but then he has multiple soft or loose bowel movements after that.  In general he has about 1 bowel movement per day and has not had any bleeding.  He also denies weight loss and said he has gained about 5 pounds recently.  He has mild intermittent reflux symptoms but denies difficulty swallowing and vomiting.    He has a family history of colon cancer and his last colonoscopy was unremarkable in 2021.  He had an upper endoscopy that showed a small hiatal hernia but was otherwise unremarkable  in 2021.      REVIEW OF SYSTEMS:    CONSTITUTIONAL: Denies any fever, chills, rigors, and weight loss.  HEENT: No earache or tinnitus. Denies hearing loss or visual disturbances.  CARDIOVASCULAR: No chest pain or palpitations.   RESPIRATORY: Denies any cough, hemoptysis, shortness of breath or dyspnea on exertion.  GASTROINTESTINAL: As noted in the History of Present Illness.   GENITOURINARY: No problems with urination. Denies any hematuria or dysuria.  NEUROLOGIC: No dizziness or vertigo, denies headaches.   MUSCULOSKELETAL: Denies any muscle or joint pain.   SKIN: Denies skin rashes or itching.   ENDOCRINE: Denies excessive thirst. Denies intolerance to heat or cold.  PSYCHOSOCIAL: Denies depression or anxiety. Denies any recent memory loss.       Historical Information   Past Medical History:   Diagnosis Date    Acute non-recurrent maxillary sinusitis 2/4/2020    Acute swimmer's ear of left side 2/11/2020    Allergic     Anxiety     GERD (gastroesophageal reflux disease)     Hyperlipidemia     Hypertension     Impacted cerumen, right ear 2/11/2020    Retinal hemorrhage of right eye 1/4/2023     Past Surgical History:   Procedure Laterality Date    TONSILECTOMY AND ADNOIDECTOMY      TONSILLECTOMY      UPPER GASTROINTESTINAL ENDOSCOPY       Social History   Social History     Substance and Sexual Activity   Alcohol Use Yes    Comment: 1 per week     Social History     Substance and Sexual Activity   Drug Use No     Social History     Tobacco Use   Smoking Status Never   Smokeless Tobacco Never     Family History   Problem Relation Age of Onset    Heart disease Father     Diabetes Father     Hypertension Father     Hypertension Brother     Colon cancer Maternal Uncle     Breast cancer Paternal Grandmother     Heart disease Paternal Grandfather     Other Paternal Grandfather         Acute myocardial infarction        Meds/Allergies       Current Outpatient Medications:     amLODIPine-benazepril (LOTREL) 10-20 MG per  "capsule    citalopram (CeleXA) 20 mg tablet    ergocalciferol (VITAMIN D2) 50,000 units    LORazepam (ATIVAN) 0.5 mg tablet    rosuvastatin (CRESTOR) 10 MG tablet    Allergies   Allergen Reactions    Ceclor [Cefaclor]     Pollen Extract            Objective     Blood pressure 130/74, temperature (!) 97.3 °F (36.3 °C), temperature source Tympanic, height 5' 8\" (1.727 m), weight 85.3 kg (188 lb). Body mass index is 28.59 kg/m².        PHYSICAL EXAM:      General Appearance:   Alert, cooperative, no distress   HEENT:   Normocephalic, atraumatic, anicteric.     Neck:  Supple, symmetrical, trachea midline   Lungs:   Clear to auscultation bilaterally; no rales, rhonchi or wheezing; respirations unlabored    Heart::   Regular rate and rhythm; no murmur, rub, or gallop.   Abdomen:   Soft, non-tender, non-distended; normal bowel sounds; no masses, no organomegaly    Genitalia:   Deferred    Rectal:   Deferred    Extremities:  No cyanosis, clubbing or edema    Pulses:  2+ and symmetric    Skin:  No jaundice, rashes, or lesions    Lymph nodes:  No palpable cervical lymphadenopathy        Lab Results:   No visits with results within 1 Day(s) from this visit.   Latest known visit with results is:   Appointment on 03/07/2024   Component Date Value    WBC 03/07/2024 5.41     RBC 03/07/2024 5.05     Hemoglobin 03/07/2024 15.3     Hematocrit 03/07/2024 44.3     MCV 03/07/2024 88     MCH 03/07/2024 30.3     MCHC 03/07/2024 34.5     RDW 03/07/2024 11.6     Platelets 03/07/2024 287     MPV 03/07/2024 9.0     Sodium 03/07/2024 137     Potassium 03/07/2024 4.2     Chloride 03/07/2024 102     CO2 03/07/2024 29     ANION GAP 03/07/2024 6     BUN 03/07/2024 14     Creatinine 03/07/2024 1.02     Glucose, Fasting 03/07/2024 104 (H)     Calcium 03/07/2024 9.8     AST 03/07/2024 19     ALT 03/07/2024 26     Alkaline Phosphatase 03/07/2024 50     Total Protein 03/07/2024 7.1     Albumin 03/07/2024 4.4     Total Bilirubin 03/07/2024 0.81     eGFR " 03/07/2024 89     Cholesterol 03/07/2024 166     Triglycerides 03/07/2024 101     HDL, Direct 03/07/2024 43     LDL Calculated 03/07/2024 103 (H)     TSH 3RD GENERATON 03/07/2024 3.332     Vit D, 25-Hydroxy 03/07/2024 33.2          Radiology Results:   US thyroid    Result Date: 3/20/2024  Narrative: THYROID ULTRASOUND INDICATION: E03.8: Other specified hypothyroidism. COMPARISON: None TECHNIQUE: Ultrasound of the thyroid was performed with a high frequency linear transducer in transverse and sagittal planes including volumetric imaging sweeps as well as traditional still imaging technique. FINDINGS: Thyroid texture: Normal homogeneous smooth echotexture. Right lobe: 5.4 x 1.8 x 1.6 cm. Volume 7.6 mL Left lobe: 4.9 x 1.5 x 1.6 cm. Volume 5.6 mL Isthmus: 0.4 cm. No thyroid nodules are demonstrated.     Impression: Normal thyroid ultrasound Reference: ACR Thyroid Imaging, Reporting and Data System (TI-RADS): White Paper of the ACR TI-RADS Committee. J AM Juaquin Radiol 2017;14:587-595. Additional recommendations based on American Thyroid Association 2015 guidelines. Workstation performed: [id number]     XR shoulder 2+ vw left    Result Date: 3/15/2024  Narrative: This result has an attachment that is not available.    Impression:  3 VIEW OF LEFT SHOULDER OBTAINED WHICH SHOWED NO EVIDENCE OF FRACTURE, DISLOCATION, OR DEGENERATIVE ARTHRITIS

## 2024-04-15 ENCOUNTER — ANESTHESIA (OUTPATIENT)
Dept: ANESTHESIOLOGY | Facility: HOSPITAL | Age: 44
End: 2024-04-15

## 2024-04-15 ENCOUNTER — ANESTHESIA EVENT (OUTPATIENT)
Dept: ANESTHESIOLOGY | Facility: HOSPITAL | Age: 44
End: 2024-04-15

## 2024-04-28 NOTE — ASSESSMENT & PLAN NOTE
1st    [facility]  at [facility].    Emerson's past medical history is significant for hypertension, hyperlipidemia, and anxiety.  His medications include amlodipine/benazepril 10/20, rosuvastatin 10, citalopram 20, and as needed lorazepam 0.5 mg.    His past surgical history    Emerson's family history is significant for cardiovascular disease (father had quadruple bypass at age 60), diabetes, hyperlipidemia, and hypertension (on father side).  His brother also has history of cardiovascular disease, diabetes, hypertension, and hyperlipidemia.    Emerson is a non-smoker.  He does not drink alcohol regularly.  He consumes approximately 2 to 3 cups of coffee per day and 1 diet soda.

## 2024-05-07 ENCOUNTER — OFFICE VISIT (OUTPATIENT)
Dept: FAMILY MEDICINE CLINIC | Facility: CLINIC | Age: 44
End: 2024-05-07

## 2024-05-07 ENCOUNTER — HOSPITAL ENCOUNTER (OUTPATIENT)
Dept: NON INVASIVE DIAGNOSTICS | Facility: CLINIC | Age: 44
Discharge: HOME | End: 2024-05-07

## 2024-05-07 ENCOUNTER — APPOINTMENT (OUTPATIENT)
Dept: LAB | Facility: CLINIC | Age: 44
End: 2024-05-07

## 2024-05-07 ENCOUNTER — HOSPITAL ENCOUNTER (OUTPATIENT)
Dept: CT IMAGING | Facility: HOSPITAL | Age: 44
Discharge: HOME | End: 2024-05-07

## 2024-05-07 ENCOUNTER — HOSPITAL ENCOUNTER (OUTPATIENT)
Dept: ULTRASOUND IMAGING | Facility: HOSPITAL | Age: 44
Discharge: HOME | End: 2024-05-07

## 2024-05-07 ENCOUNTER — HOSPITAL ENCOUNTER (OUTPATIENT)
Dept: VASCULAR ULTRASOUND | Facility: HOSPITAL | Age: 44
Discharge: HOME | End: 2024-05-07

## 2024-05-07 VITALS
BODY MASS INDEX: 27.74 KG/M2 | OXYGEN SATURATION: 99 % | WEIGHT: 183 LBS | RESPIRATION RATE: 14 BRPM | SYSTOLIC BLOOD PRESSURE: 132 MMHG | DIASTOLIC BLOOD PRESSURE: 76 MMHG | HEIGHT: 68 IN | HEART RATE: 60 BPM

## 2024-05-07 VITALS
WEIGHT: 182.98 LBS | DIASTOLIC BLOOD PRESSURE: 76 MMHG | SYSTOLIC BLOOD PRESSURE: 132 MMHG | HEIGHT: 68 IN | BODY MASS INDEX: 27.73 KG/M2 | HEART RATE: 50 BPM

## 2024-05-07 DIAGNOSIS — F41.9 ANXIETY: ICD-10-CM

## 2024-05-07 DIAGNOSIS — E78.2 MIXED HYPERLIPIDEMIA: Primary | ICD-10-CM

## 2024-05-07 DIAGNOSIS — E66.3 OVERWEIGHT (BMI 25.0-29.9): ICD-10-CM

## 2024-05-07 DIAGNOSIS — E03.8 SUBCLINICAL HYPOTHYROIDISM: ICD-10-CM

## 2024-05-07 DIAGNOSIS — I10 BENIGN ESSENTIAL HYPERTENSION: ICD-10-CM

## 2024-05-07 LAB
AORTIC ROOT: 3.1 CM
APICAL FOUR CHAMBER EJECTION FRACTION: 66 %
ASCENDING AORTA: 3.1 CM
ATRIAL RATE: 54 BPM
BSA FOR ECHO PROCEDURE: 1.97 M2
E WAVE DECELERATION TIME: 379 MS
E/A RATIO: 1.44
FRACTIONAL SHORTENING: 35 (ref 28–44)
INTERVENTRICULAR SEPTUM IN DIASTOLE (PARASTERNAL SHORT AXIS VIEW): 0.9 CM
INTERVENTRICULAR SEPTUM: 0.9 CM (ref 0.6–1.1)
LAAS-AP2: 21.8 CM2
LAAS-AP4: 21.1 CM2
LEFT ATRIUM AREA SYSTOLE SINGLE PLANE A4C: 22.7 CM2
LEFT ATRIUM SIZE: 3.2 CM
LEFT ATRIUM VOLUME (MOD BIPLANE): 68 ML
LEFT ATRIUM VOLUME INDEX (MOD BIPLANE): 34.5 ML/M2
LEFT INTERNAL DIMENSION IN SYSTOLE: 3.2 CM (ref 2.1–4)
LEFT VENTRICULAR INTERNAL DIMENSION IN DIASTOLE: 4.9 CM (ref 3.5–6)
LEFT VENTRICULAR POSTERIOR WALL IN END DIASTOLE: 0.9 CM
LEFT VENTRICULAR STROKE VOLUME: 73 ML
LVSV (TEICH): 73 ML
MAX HR PERCENT: 99 %
MAX HR: 173 BPM
MV E'TISSUE VEL-SEP: 11 CM/S
MV PEAK A VEL: 0.45 M/S
MV PEAK E VEL: 65 CM/S
MV STENOSIS PRESSURE HALF TIME: 110 MS
MV VALVE AREA P 1/2 METHOD: 2
P AXIS: 15 DEGREES
POST LVEF: 70 %
PR INTERVAL: 140 MS
QRS AXIS: 33 DEGREES
QRSD INTERVAL: 100 MS
QT INTERVAL: 424 MS
QTC INTERVAL: 402 MS
RATE PRESSURE PRODUCT: NORMAL
RIGHT ATRIUM AREA SYSTOLE A4C: 12.7 CM2
RIGHT VENTRICLE ID DIMENSION: 2.2 CM
SL CV LEFT ATRIUM LENGTH A2C: 5.3 CM
SL CV LV EF: 60
SL CV LV EF: 60
SL CV PED ECHO LEFT VENTRICLE DIASTOLIC VOLUME (MOD BIPLANE) 2D: 115 ML
SL CV PED ECHO LEFT VENTRICLE SYSTOLIC VOLUME (MOD BIPLANE) 2D: 42 ML
SL CV STRESS RECOVERY BP: NORMAL MMHG
SL CV STRESS RECOVERY HR: 87 BPM
SL CV STRESS RECOVERY O2 SAT: 97 %
SL CV STRESS STAGE REACHED: 4
STRESS ANGINA INDEX: 0
STRESS BASELINE BP: NORMAL MMHG
STRESS BASELINE HR: 74 BPM
STRESS O2 SAT REST: 98 %
STRESS PEAK HR: 173 BPM
STRESS POST ESTIMATED WORKLOAD: 12.5 METS
STRESS POST EXERCISE DUR MIN: 10 MIN
STRESS POST EXERCISE DUR SEC: 31 SEC
STRESS POST O2 SAT PEAK: 98 %
STRESS POST PEAK BP: 178 MMHG
T WAVE AXIS: 25 DEGREES
TR MAX PG: 19 MMHG
TR PEAK VELOCITY: 2.2 M/S
TRICUSPID ANNULAR PLANE SYSTOLIC EXCURSION: 2.8 CM
TRICUSPID VALVE PEAK REGURGITATION VELOCITY: 2.17 M/S
VENTRICULAR RATE: 54 BPM

## 2024-05-07 PROCEDURE — 93005 ELECTROCARDIOGRAM TRACING: CPT

## 2024-05-07 PROCEDURE — 93922 UPR/L XTREMITY ART 2 LEVELS: CPT

## 2024-05-07 PROCEDURE — 99499EX: Performed by: FAMILY MEDICINE

## 2024-05-07 PROCEDURE — 93350 STRESS TTE ONLY: CPT | Performed by: INTERNAL MEDICINE

## 2024-05-07 PROCEDURE — 93350 STRESS TTE ONLY: CPT

## 2024-05-07 PROCEDURE — 93010 ELECTROCARDIOGRAM REPORT: CPT | Performed by: INTERNAL MEDICINE

## 2024-05-07 PROCEDURE — 93306 TTE W/DOPPLER COMPLETE: CPT

## 2024-05-07 PROCEDURE — G1004 CDSM NDSC: HCPCS

## 2024-05-07 PROCEDURE — 93306 TTE W/DOPPLER COMPLETE: CPT | Performed by: INTERNAL MEDICINE

## 2024-05-07 PROCEDURE — 76700 US EXAM ABDOM COMPLETE: CPT

## 2024-05-07 PROCEDURE — 75571 CT HRT W/O DYE W/CA TEST: CPT

## 2024-05-07 RX ORDER — LEVOTHYROXINE SODIUM 0.03 MG/1
25 TABLET ORAL DAILY
Qty: 90 TABLET | Refills: 0 | Status: SHIPPED | OUTPATIENT
Start: 2024-05-07

## 2024-05-07 NOTE — PROGRESS NOTES
Fitness Summary and Recommendations: Emerson scored at the 25% on his body composition assessment with a bodyfat % of 24.8%. Emerson scored in the average range for flexibility with a sit & reach score of 22 cm. His Muscle Strength/Endurance scores placed him at the 50% (lower body) and 75% (upper body) with a chair stand score of 26 and arm curl score of 27 respectively. Emerson Cardiovascular Score of 47.2 placed him at the 85%. Overall, Emerson would be considered to have an average fitness level with a 57% score. Continued emphasis on regular exercise, strength training and sound nutrition practices will enable Emerson to reach his optimal level of fitness.

## 2024-05-07 NOTE — PROGRESS NOTES
"  Your [facility] Board-Certified Dermatologist's Name: Sharon Flowers MD    Skin Screening Exam:    A chaperone was present throughout the entire encounter.      SKIN:  FULL ORGAN SYSTEM EXAM   Hair, Scalp, Ears, Face Normal except as noted below in Assessment   Neck, Cervical Chain Nodes Normal except as noted below in Assessment   Right Arm/Hand/Fingers Normal except as noted below in Assessment   Left Arm/Hand/Fingers Normal except as noted below in Assessment   Chest/Breasts/Axillae Did the patient specifically refuse to have the areas \"under-the-bra\" examined by the Dermatologist? YES  Examined areas normal except as noted below in Assessment   Abdomen, Umbilicus Normal except as noted below in Assessment   Back/Spine Normal except as noted below in Assessment   Groin/Genitalia/Buttocks Did the patient specifically refuse to have the areas \"under-the-underwear\" examined by the Dermatologist? No  Examined areas normal except as noted below in Assessment   Right Leg, Foot, Toes Normal except as noted below in Assessment   Left Leg, Foot, Toes Normal except as noted below in Assessment        Assessment and Plan by Diagnosis:    Use a moisturizer + sunscreen \"combo\" product such as Neutrogena Daily Defense SPF 50+ or CeraVe AM at least three times a day.  Follow-up with your private dermatologist or one of our board-certified [facility] Dermatologists as discussed.  [facility]'s own Dr. Bertha Anderson is available for consultation for skin enhancement and revitalization services; please mention \"[facility]\" for expedited scheduling    MELANOCYTIC NEVI (\"Moles\")    Physical Exam:  Anatomic Location Affected:   Mostly on sun-exposed areas of the trunk, extremities  Morphological Description:  Scattered, 1-4mm round to ovoid, symmetrical-appearing, even bordered, skin colored to dark brown macules/papules, mostly in sun-exposed areas  Pertinent Positives:  Pertinent Negatives:    Additional History of " "Present Condition:  pt has no family or personal history of skin cancer. No concerns. Follows yearly with personal dermatologist.    Assessment and Plan:  Based on a thorough discussion of this condition and the management approach to it (including a comprehensive discussion of the known risks, side effects and potential benefits of treatment), the patient (family) agrees to implement the following specific plan:  Monitor for changes  Continue yearly skin exams     Melanocytic Nevi  Melanocytic nevi (\"moles\") are tan or brown, raised or flat areas of the skin which have an increased number of melanocytes. Melanocytes are the cells in our body which make pigment and account for skin color.    Some moles are present at birth (I.e., \"congenital nevi\"), while others come up later in life (i.e., \"acquired nevi\").  The sun can stimulate the body to make more moles.  Sunburns are not the only thing that triggers more moles.  Chronic sun exposure can do it too.     Clinically distinguishing a healthy mole from melanoma may be difficult, even for experienced dermatologists. The \"ABCDE's\" of moles have been suggested as a means of helping to alert a person to a suspicious mole and the possible increased risk of melanoma.  The suggestions for raising alert are as follows:    Asymmetry: Healthy moles tend to be symmetric, while melanomas are often asymmetric.  Asymmetry means if you draw a line through the mole, the two halves do not match in color, size, shape, or surface texture. Asymmetry can be a result of rapid enlargement of a mole, the development of a raised area on a previously flat lesion, scaling, ulceration, bleeding or scabbing within the mole.  Any mole that starts to demonstrate \"asymmetry\" should be examined promptly by a board certified dermatologist.     Border: Healthy moles tend to have discrete, even borders.  The border of a melanoma often blends into the normal skin and does not sharply delineate the mole " "from normal skin.  Any mole that starts to demonstrate \"uneven borders\" should be examined promptly by a board certified dermatologist.     Color: Healthy moles tend to be one color throughout.  Melanomas tend to be made up of different colors ranging from dark black, blue, white, or red.  Any mole that demonstrates a color change should be examined promptly by a board certified dermatologist.     Diameter: Healthy moles tend to be smaller than 0.6 cm in size; an exception are \"congenital nevi\" that can be larger.  Melanomas tend to grow and can often be greater than 0.6 cm (1/4 of an inch, or the size of a pencil eraser). This is only a guideline, and many normal moles may be larger than 0.6 cm without being unhealthy.  Any mole that starts to change in size (small to bigger or bigger to smaller) should be examined promptly by a board certified dermatologist.     Evolving: Healthy moles tend to \"stay the same.\"  Melanomas may often show signs of change or evolution such as a change in size, shape, color, or elevation.  Any mole that starts to itch, bleed, crust, burn, hurt, or ulcerate or demonstrate a change or evolution should be examined promptly by a board certified dermatologist.      Dysplastic Nevi  Dysplastic moles are moles that fit the ABCDE rules of melanoma but are not identified as melanomas when examined under the microscope.  They may indicate an increased risk of melanoma in that person. If there is a family history of melanoma, most experts agree that the person may be at an increased risk for developing a melanoma.  Experts still do not agree on what dysplastic moles mean in patients without a personal or family history of melanoma.  Dysplastic moles are usually larger than common moles and have different colors within it with irregular borders. The appearance can be very similar to a melanoma. Biopsies of dysplastic moles may show abnormalities which are different from a regular mole.  " "    Melanoma  Malignant melanoma is a type of skin cancer that can be deadly if it spreads throughout the body. The incidence of melanoma in the [place] is growing faster than any other cancer. Melanoma usually grows near the surface of the skin for a period of time, and then begins to grow deeper into the skin. Once it grows deeper into the skin, the risk of spread to other organs greatly increases. Therefore, early detection and removal of a malignant melanoma may result in a better chance at a complete cure; removal after the tumor has spread may not be as effective, leading to worse clinical outcomes such as death.    The true rate of nevus transformation into a melanoma is unknown. It has been estimated that the lifetime risk for any acquired melanocytic nevus on any 20-year-old individual transforming into melanoma by age 80 is 0.03% (1 in 3,164) for men and 0.009% (1 in 10,800) for women.     The appearance of a \"new mole\" remains one of the most reliable methods for identifying a malignant melanoma.  Occasionally, melanomas appear as rapidly growing, blue-black, dome-shaped bumps within a previous mole or previous area of normal skin.  Other times, melanomas are suspected when a mole suddenly appears or changes. Itching, burning, or pain in a pigmented lesion should increase suspicion, but most patients with early melanoma have no skin discomfort whatsoever.  Melanoma can occur anywhere on the skin, including areas that are difficult for self-examination. Many melanomas are first noticed by other family members.  Suspicious-looking moles may be removed for microscopic examination.       You may be able to prevent death from melanoma by doing two simple things:    Try to avoid unnecessary sun exposure and protect your skin when it is exposed to the sun.  People who live near the equator, people who have intermittent exposures to large amounts of sun, and people who have had sunburns in childhood or " "adolescence have an increased risk for melanoma. Sun sense and vigilant sun protection may be keys to helping to prevent melanoma.  We recommend wearing UPF-rated sun protective clothing and sunglasses whenever possible and applying a moisturizer-sunscreen combination product (SPF 50+) such as Neutrogena Daily Defense to sun exposed areas of skin at least three times a day.    Have your moles regularly examined by a board certified dermatologist AND by yourself or a family member/friend at home.  We recommend that you have your moles examined at least once a year by a board certified dermatologist.  Use your birthday as an annual reminder to have your \"Birthday Suit\" (I.e., your skin) examined; it is a nice birthday gift to yourself to know that your skin is healthy appearing!  Additionally, at-home self examinations may be helpful for detecting a possible melanoma.  Use the ABCDEs we discussed and check your moles once a month at home.        "

## 2024-05-07 NOTE — PROGRESS NOTES
Heart and Vascular Summary:    Baseline ECG: Sinus bradycardia, otherwise normal ECG.  Your heart rate is slow, which is a sign of good conditioning of your heart.      Echocardiogram: Normal right and left ventricular size and function.  Normal valvular structure and function.  This is a normal echocardiogram.      Stress Echocardiogram: Good exercise capacity (10 min 30 sec), achieving 12.5 METS, and 99% of maximal predicted heart rate.  You had no changes on the ECG portion to suggest ischemia or blockage.  Blood pressure was normal at the start of the test, with a normal response to exercise (peak blood pressure of 180 systolic).  You had a good heart rate recovery after exercise.  Normal baseline left and right ventricular wall motion and function on echocardiogram with no wall motion abnormalities seen at peak stress to suggest any significant blockages in your coronary arteries over 50% that may require revascularization.         Lipid Profile: this revealed total cholesterol of 153, LDL of 90, HDL of 44, and a Triglyceride level of 97. The total cholesterol is a sum of its individual parts.  The HDL is the good cholesterol, which is protective to your heart.  Your level of is at goal of over 40.  The only way to raise this level is with increased activity/exercise.  The Triglycerides are a marker of your diet and genetics.  Less than 150 is what we aim for, and your level is well controlled.  Reducing the simple sugars and carbohydrates in your diet will help reduce this value.  The LDL is the bad cholesterol, the cholesterol that builds atherosclerotic plaque in your arteries.  Your level is at your goal of less than 120.  Reducing the trans-fats and saturated fats in your diet along with increasing exercise can help improve this value.      Coronary Calcium Score: Your total calcium score was 109, which means that you have mild old plaque build up in your coronary arteries.  This does not rule out the  possibility of soft plaque that is currently being built up.

## 2024-05-07 NOTE — PROGRESS NOTES
Hearing Assessment Summary:   Hearing Screening    250Hz 500Hz 1000Hz 2000Hz 3000Hz 4000Hz 6000Hz 8000Hz   Right ear 10 10 10 10 10 15 10 5   Left ear 10 10 10 5 10 10 15 10   Comments: HEARING EVALUATION    Name:  Emerson Joyce  :  1980  Age:  43 y.o.   MRN:  [id number]  Date of Evaluation: 24     History: [facility] Exam  Reason for visit: Emerson Joyce is being seen today for an evaluation of hearing as part of an [facility] examination.  patient reports no concern over hearing, and denies ear pain, tinnitus and dizziness. He reports no known history of noise exposure.       EVALUATION:    Otoscopic Evaluation:   Right Ear: red ear canal (airpod use last night) and healthy tympanic membrane   Left Ear: Clear and healthy ear canal and tympanic membrane    Tympanometry:   Right: Type A - normal middle ear pressure and compliance   Left: Type A - normal middle ear pressure and compliance    Audiogram Results:  Normal peripheral hearing sensitivity with excellent speech discrimination ability in each ear.    *see attached audiogram      RECOMMENDATIONS:  Return to [facility]. for F/U, Copy to Patient/Caregiver, and 2 year hearing evaluation (sooner if difficulty noted).    PATIENT EDUCATION:   Discussed results and recommendations with patient.  Questions were addressed and the patient was encouraged to contact our department should concerns arise.      Patti Knight.  Clinical Audiologist'      Vision Screening    Right eye Left eye Both eyes   Without correction      With correction 20/30 20/30 20/25

## 2024-05-07 NOTE — PROGRESS NOTES
Nutritional Summary and Recommendations:    Patient Nutrition-Oriented Medical/Diet Hx  [facility]. Emerson reports typically eating a well-balanced diet to include lean proteins, fruits/vegetables, complex carbohydrates, and heart healthy fats. Emerson's biggest concern is snacking on chips/cookies before bed. Discussion focused on heart healthy diet, reducing sodium, adequate fruit/vegetable intake, healthy snacking, and increasing water intake.           Labs:  A1C 5.4  Total Cholesterol 153  Triglycerides 97  HDL 44  LDL 90  Vitamin D 55.7     Anthropometrics:     Ht: 5 ft 8 in Wt: 181.6 lb   BMI: 27.6     BMR: 1776 kcals  Fat%: 24.8%  Acceptable Range: 11-22%     Fat Mass: 45 lb FFM: 136.6 lb  TBW: 100 lb      Nutrition Diagnosis:  Undesirable food choices r/t heart healthy diet as evidenced by snacking on high salt/saturated fat foods.     Nutrition Interventions:  Incorporate more plant based proteins such as beans and lentils to lunch salads.   Prepare soups at home using low sodium broth and no added salt for any canned products.  Aim for at least 64 fluid ounces of water daily     Nutrition Recommendations:    Maintain A1C, cholesterol, and Vitamin D labs within normal limits by next visit.  Aim to eat more calories during the day to decrease serving size of dinner and snacking before bed.  Consider lower sodium snacks such as plain popcorn, low fat yogurt, peanut butter/fruit.   Contact RD with any questions or concerns.    Nutrition Education     Cardiac Heart Healthy Diet and Healthy Snacking       Perceived Comprehension:  Good     Expected Compliance:  Good

## 2024-05-07 NOTE — ASSESSMENT & PLAN NOTE
Your weight today is 182 pounds.  Your body mass index is 27.82.  This puts you in the overweight category.  I would recommend an ideal weight of approximately 170 to 175 pounds.  Hopefully the information given to you today by our nutritionist and fitness expert will be beneficial for you to achieve this goal.

## 2024-05-07 NOTE — ASSESSMENT & PLAN NOTE
You mentioned you have a history of anxiety, with some mild depression.  Fortunately, it seems that you are getting some benefit since starting citalopram 20 mg daily.  I would recommend continuing this treatment plan. I would also recommend regular follow up with your PCP for monitoring.

## 2024-05-07 NOTE — ASSESSMENT & PLAN NOTE
Your labs today indicate borderline low thyroid function.  This is similar to your labs in the past few years.  You mentioned that you have been experiencing considerable fatigue.  I think it would be reasonable to give you a low-dose trial of thyroid replacement (levothyroxine 25 mcg daily).  To see if this helps with your symptoms.  I would recommend repeating your thyroid labs again in approximately 2 months.  I will place a lab order for you to get this done.

## 2024-05-07 NOTE — ASSESSMENT & PLAN NOTE
Your cholesterol today is well-controlled on rosuvastatin 10 mg daily (cholesterol 153, LDL 90).  Your coronary calcium CT score was 109 today (previously 65 in 2021).  This indicates a small amount of calcium in your left main coronary artery.  I would recommend you continue to work on risk factor reduction; continue rosuvastatin 10 mg daily, eat a heart healthy diet, and continue regular exercise program.

## 2024-05-07 NOTE — PROGRESS NOTES
[facility] Physical Exam     Emerson Joyce is a 43 y.o. male who is presenting for his first [facility] Physical Exam at [facility]. Jhon is the Global  at [email or web address].    Jhon's past medical history is significant for hypertension, hyperlipidemia, and anxiety.  His medications include amlodipine/benazepril 10/20, rosuvastatin 10, citalopram 20, and as needed lorazepam 0.5 mg.    His past surgical history is significant for tonsillectomy/adenoidectomy    Jhon's family history is significant for cardiovascular disease (father had quadruple bypass at age 60), diabetes, hyperlipidemia, and hypertension (on father side).  His brother also has history of cardiovascular disease, diabetes, hypertension, and hyperlipidemia.    Emerson is a non-smoker.  He does not drink alcohol regularly.  He consumes approximately 4-5 cups of coffee per day.    His biggest concerns today include occasional gastric reflux and his family history of cardiovascular disease.    Review of Systems   Constitutional:  Negative for chills and fever.   HENT:  Negative for ear pain and sore throat.    Eyes:  Negative for pain and visual disturbance.   Respiratory:  Negative for cough and shortness of breath.    Cardiovascular:  Negative for chest pain and palpitations.   Gastrointestinal:  Negative for abdominal pain and vomiting.   Genitourinary:  Negative for dysuria and hematuria.   Musculoskeletal:  Negative for arthralgias and back pain.   Skin:  Negative for color change and rash.   Neurological:  Negative for seizures and syncope.   All other systems reviewed and are negative.        Active Ambulatory Problems     Diagnosis Date Noted   • Anxiety 12/06/2012   • Benign essential hypertension 12/06/2012   • Hyperlipidemia 12/14/2016   • Gastroesophageal reflux disease without esophagitis 05/25/2017   • Vitamin D deficiency 08/19/2015   • Well adult exam 05/10/2018   • Subclinical  Patient Instructions 1. Remember hydration goals - minimum of 64 ounces of liquids per day (dehydration is the number one reason for hospital readmission). 2. Sleep 7-9 hours each night to keep your metabolism up. 3. Continue to monitor carbohydrate and protein intake you need a minimum of  Grams of protein daily- remember to keep your total carbohydrates to 50 grams or less per day for best results. 4. To maximize weight loss keep your caloric intake between 800-1,200 calories daily. If you are exercising excessively, such as training for a marathon, you need to keep a food log and meet with the dietician so they can advise you on your diet choices, carbohydrate intake and caloric intake. 5. Continue to work towards exercise goals - 60-90 minutes, 5 times a week minimum of deliberate, aerobic exercise is the ultimate goal with strength training 2 times each week. Refer to [employer] for  information. 6. Remember to take vitamins as directed in your handbook. 7. Attend support group the 2nd Thursday of each month. 8. Constipation: Milk of Magnesia is for immediate relief only. Miralax is to be used every day if constipation is a chronic problem. 9. Diarrhea: patients will occasionally develop lactose intolerance after surgery. Check to see if your protein shake has whey in it. If it does try a protein powder or drink that does not have whey and stop all yogurts, cheeses and milks to see if the diarrhea goes away. 10. If you have had labs drawn. We will only call you if you have abnormal results. Otherwise you can access the lab results in \"[email or web address]". You will only need the access code the first time you sign on.    
11. Call us at [phone] or email us through [email or web address]" with questions,     concerns or worsening of condition, we have someone on call 24 hours a day. If you are unable to reach our office, you are to go to your Primary Care Physician or the Emergency Department. NOTE TO GASTRIC BYPASS PATIENTS:  (SAME APPLIES TO GASTRIC SLEEVE PATIENTS FOR FIRST TWO MONTHS) Remember that for the rest of your life, you are not able to take the following: 
- NSAIDs (ibuprofen, goody powder, BC powder, Motrin, Advil, Mobic, Voltaren, Excedrin, etc.) - Steroid pills or injections - Smoke (cigarettes or recreational drugs) - Alcohol Use of any of the above may cause ulcers in your stomach which may perforate causing a medical emergency and surgery. Speak to our medical staff if another medical provider requires you to take steroids or NSAIDs. Supplement Resource Guide Importance of Protein:  
Maintains lean body mass, produces antibodies to fight off infections, heals wounds, minimizes hair loss, helps to give you energy, helps with satiety, and keeping you full between meals. Importance of Calcium: 
Needed for healthy bones and teeth, normal blood clotting, and nervous system functioning, higher risk of osteoporosis and bone disease with non-compliance. Importance of Multivitamins: Many functions. Supply you with extra nutrients that you may be missing from food. May lead to iron deficiency anemia, weakness, fatigue, and many other symptoms with non-compliance. Importance of B Vitamins: 
Important for red blood cell formation, metabolism, energy, and helps to maintain a healthy nervous system. Protein Supplement Liquid diet phase: consume 90-100g protein daily. Once you are eating consume 35-50g protein each day from your protein supplement. 0-3 g fat per serving 0-3 g sugar per serving The body can only absorb 30g of protein at one time, so do not consume more than that at one time. Multi-vitamin Supplement:   
Start immediately after surgery: any complete chewable, such as: [employer]s Complete chewables. hypothyroidism 03/16/2021   • Family history of colon cancer 10/18/2021   • Chronic seasonal allergic rhinitis due to pollen 03/29/2022   • History of COVID-19 04/11/2022   • Annual physical exam 09/06/2023   • Irritable bowel syndrome with both constipation and diarrhea 04/08/2024   • Overweight (BMI 25.0-29.9) 05/07/2024     Resolved Ambulatory Problems     Diagnosis Date Noted   • Chronic low back pain 08/01/2017   • Chronic neck pain 08/01/2017   • Acute non-recurrent maxillary sinusitis 02/04/2020   • Acute swimmer's ear of left side 02/11/2020   • Impacted cerumen, right ear 02/11/2020   • Dermatitis 05/06/2020   • Annual physical exam 12/07/2020   • Right upper quadrant abdominal pain 09/22/2021   • Change in bowel habits 10/18/2021   • Mouth lesion 01/05/2022   • Food allergy 03/23/2022   • Allergic conjunctivitis, bilateral 03/29/2022   • Allergic rhinitis due to dust mite 03/29/2022   • Nasal congestion 03/29/2022   • Rhinorrhea 03/29/2022   • Retinal hemorrhage of right eye 01/04/2023     Past Medical History:   Diagnosis Date   • Allergic    • GERD (gastroesophageal reflux disease)    • Hypertension        Past Surgical History:   Procedure Laterality Date   • TONSILECTOMY AND ADNOIDECTOMY     • TONSILLECTOMY     • UPPER GASTROINTESTINAL ENDOSCOPY         Family History   Problem Relation Age of Onset   • Heart disease Father    • Diabetes Father    • Hypertension Father    • Hypertension Brother    • Colon cancer Maternal Uncle    • Breast cancer Paternal Grandmother    • Heart disease Paternal Grandfather    • Other Paternal Grandfather         Acute myocardial infarction        Social History     Tobacco Use   Smoking Status Never   Smokeless Tobacco Never         Current Outpatient Medications:   •  levothyroxine (Euthyrox) 25 mcg tablet, Take 1 tablet (25 mcg total) by mouth daily, Disp: 90 tablet, Rfl: 0  •  amLODIPine-benazepril (LOTREL) 10-20 MG per capsule, TAKE 1 CAPSULE BY MOUTH EVERY DAY,  "Disp: 90 capsule, Rfl: 1  •  citalopram (CeleXA) 20 mg tablet, Take 1 tablet (20 mg total) by mouth daily, Disp: 90 tablet, Rfl: 3  •  ergocalciferol (VITAMIN D2) 50,000 units, Take 1 capsule (50,000 Units total) by mouth once a week, Disp: 12 capsule, Rfl: 3  •  LORazepam (ATIVAN) 0.5 mg tablet, TAKE 1 TABLET BY MOUTH DAILY AT BEDTIME, Disp: 30 tablet, Rfl: 3  •  rosuvastatin (CRESTOR) 10 MG tablet, Take 1 tablet (10 mg total) by mouth daily, Disp: 90 tablet, Rfl: 3    Allergies   Allergen Reactions   • Ceclor [Cefaclor]    • Pollen Extract          Objective:    Vitals:    05/07/24 0757   BP: 132/76   Pulse: 60   Resp: 14   SpO2: 99%   Weight: 83 kg (183 lb)   Height: 5' 8\" (1.727 m)        Physical Exam  Vitals and nursing note reviewed.   Constitutional:       Appearance: Normal appearance. He is well-developed.   HENT:      Head: Normocephalic and atraumatic.      Right Ear: Tympanic membrane, ear canal and external ear normal. There is no impacted cerumen.      Left Ear: Tympanic membrane, ear canal and external ear normal. There is no impacted cerumen.      Nose: Nose normal.      Mouth/Throat:      Mouth: Mucous membranes are moist.      Pharynx: Oropharynx is clear. No posterior oropharyngeal erythema.   Eyes:      Extraocular Movements: Extraocular movements intact.      Conjunctiva/sclera: Conjunctivae normal.      Pupils: Pupils are equal, round, and reactive to light.   Neck:      Vascular: No carotid bruit.   Cardiovascular:      Rate and Rhythm: Normal rate and regular rhythm.      Pulses: Normal pulses.      Heart sounds: Normal heart sounds. No murmur heard.  Pulmonary:      Effort: Pulmonary effort is normal.      Breath sounds: Normal breath sounds.   Abdominal:      General: Abdomen is flat. Bowel sounds are normal. There is no distension.      Palpations: Abdomen is soft. There is no mass.      Tenderness: There is no abdominal tenderness.      Hernia: No hernia is present.   Genitourinary:     " Avoid [place] sours or gummies. They lack iron and other important nutrients and also have added sugar. Continue with a chewable vitamin or change to an adult complete multivitamin one month after surgery. Menstruating women can take a prenatal vitamin. Make sure it has at least 18 mg iron and 754-575 mcg folic acid Calcium Supplement:  
 
Start taking within one month after surgery. Look for:  
Calcium Citrate Plus D (1500 mg per day) Recommend: Citracal 
 
Avoid chocolate chewable calcium. Can use chewable bariatric or [employer] brand or similar chewable. The body cannot absorb more than 500-600 mg of calcium at one time. Take for Life Vitamin D Take 3,000 international units daily Vitamin B12 B Complex Vitamin Start taking both within one month after surgery. Vitamin B12 (sublingual): Take 1000 mcg of Vitamin B12 three times weekly Must take sublingually (meaning you put it under your tongue) or in a liquid drop form for easy absorption. B Complex Vitamin:  
Take one pill daily or liquid drop form daily; as directed on bottle. Take for Life Comments: RACHEL: declined/deferred  Musculoskeletal:         General: Normal range of motion.      Cervical back: Normal range of motion and neck supple.   Lymphadenopathy:      Cervical: No cervical adenopathy.   Skin:     General: Skin is warm.      Capillary Refill: Capillary refill takes less than 2 seconds.      Coloration: Skin is not jaundiced.      Findings: No rash.   Neurological:      General: No focal deficit present.      Mental Status: He is alert and oriented to person, place, and time.      Cranial Nerves: No cranial nerve deficit.      Motor: No weakness.      Gait: Gait normal.      Deep Tendon Reflexes: Reflexes are normal and symmetric.   Psychiatric:         Mood and Affect: Mood normal.         Behavior: Behavior normal.         Thought Content: Thought content normal.         Judgment: Judgment normal.             Assessment/Plan:     1. Mixed hyperlipidemia  Assessment & Plan:  Your cholesterol today is well-controlled on rosuvastatin 10 mg daily (cholesterol 153, LDL 90).  Your coronary calcium CT score was 109 today (previously 65 in 2021).  This indicates a small amount of calcium in your left main coronary artery.  I would recommend you continue to work on risk factor reduction; continue rosuvastatin 10 mg daily, eat a heart healthy diet, and continue regular exercise program.      2. Overweight (BMI 25.0-29.9)  Assessment & Plan:  Your weight today is 182 pounds.  Your body mass index is 27.82.  This puts you in the overweight category.  I would recommend an ideal weight of approximately 170 to 175 pounds.  Hopefully the information given to you today by our nutritionist and fitness expert will be beneficial for you to achieve this goal.      3. Subclinical hypothyroidism  Assessment & Plan:  Your labs today indicate borderline low thyroid function.  This is similar to your labs in the past few years.  You mentioned that you have been experiencing considerable fatigue.  I think it would be  reasonable to give you a low-dose trial of thyroid replacement (levothyroxine 25 mcg daily).  To see if this helps with your symptoms.  I would recommend repeating your thyroid labs again in approximately 2 months.  I will place a lab order for you to get this done.    Orders:  -     levothyroxine (Euthyrox) 25 mcg tablet; Take 1 tablet (25 mcg total) by mouth daily  -     TSH, 3rd generation with Free T4 reflex; Future; Expected date: 07/01/2024    4. Benign essential hypertension  Assessment & Plan:  Your blood pressure today is reasonably controlled on amlodipine/benazepril 10/20 daily.      5. Anxiety  Assessment & Plan:  You mentioned you have a history of anxiety, with some mild depression.  Fortunately, it seems that you are getting some benefit since starting citalopram 20 mg daily.  I would recommend continuing this treatment plan. I would also recommend regular follow up with your PCP for monitoring.            [facility] Physical Summary:     Overall, I think you are in pretty good health today.  Your skin exam today did not reveal any significant abnormalities.  I would recommend continued annual checkups with a dermatologist.  Your cardiology testing was negative (including EKG, stress test, and echocardiogram).  Your coronary calcium CT scan did reveal a small amount of calcified plaque (see cardiology note for additional information).    (Also please refer to individually listed diagnoses, cardiology, dermatology audiology, fitness, and nutrition reports for more information).    Your last colonoscopy was in 2021.  Fortunately this was a normal procedure.  You were advised to have this repeated again in 5 years due to family history.    I reviewed your vaccination history.  You mentioned that you received a flu shot and COVID booster yearly.  I think this is a good idea due to your frequent travels for work.  The last tetanus booster that I can see in your chart is from 2013.  It is recommended that  you receive a booster every 10 years.  I would recommend scheduling an appointment with your PCP (or even local pharmacy) to have this administered.    Lastly, I would recommend continuing a healthy diet (including vitamin D 2000 international units daily and calcium 1200 mg daily).  I would also recommend continuing your regular exercise program (at least 150 minutes of aerobic exercise weekly).         Jhon,    Thank you for choosing [facility].  It was a pleasure meeting and getting to know you today.  If you have any questions regarding today's exam, feel free to contact me. We hope to see you again in the future.     Dave Hand ([facility] Lead Physician)  [phone] (cell)  [email or web address]

## 2024-05-09 LAB
CHEST PAIN STATEMENT: NORMAL
MAX DIASTOLIC BP: 90 MMHG
MAX PREDICTED HEART RATE: 177 BPM
PROTOCOL NAME: NORMAL
REASON FOR TERMINATION: NORMAL
STRESS POST EXERCISE DUR MIN: 10 MIN
STRESS POST EXERCISE DUR SEC: 31 SEC
STRESS POST PEAK HR: 176 BPM
STRESS POST PEAK SYSTOLIC BP: 180 MMHG
TARGET HR FORMULA: NORMAL
TEST INDICATION: NORMAL

## 2024-05-22 DIAGNOSIS — F41.9 ANXIETY: ICD-10-CM

## 2024-05-23 RX ORDER — LORAZEPAM 0.5 MG/1
0.5 TABLET ORAL
Qty: 30 TABLET | Refills: 3 | Status: SHIPPED | OUTPATIENT
Start: 2024-05-23

## 2024-05-23 NOTE — TELEPHONE ENCOUNTER
Medication:  PDMP   03/26/2024 01/30/2024 LORazepam 05 MG TABLET (Tablet) 30.0 30 0.5 MG NA JAZZY LUIS     Active agreement on file -No

## 2024-05-28 PROBLEM — Z00.00 WELL ADULT EXAM: Status: RESOLVED | Noted: 2018-05-10 | Resolved: 2024-05-28

## 2024-08-05 DIAGNOSIS — F41.9 ANXIETY: ICD-10-CM

## 2024-08-05 DIAGNOSIS — E03.8 SUBCLINICAL HYPOTHYROIDISM: ICD-10-CM

## 2024-08-05 RX ORDER — LORAZEPAM 0.5 MG/1
0.5 TABLET ORAL
Qty: 30 TABLET | Refills: 5 | Status: SHIPPED | OUTPATIENT
Start: 2024-08-05

## 2024-08-05 NOTE — TELEPHONE ENCOUNTER
Medication:  PDMP     07/03/2024 01/30/2024 LORazepam (Tablet) 30.0 30 0.5 MG GONZALES LUIS     Active agreement on file -No

## 2024-08-06 RX ORDER — LEVOTHYROXINE SODIUM 0.03 MG/1
25 TABLET ORAL DAILY
Qty: 90 TABLET | Refills: 0 | Status: SHIPPED | OUTPATIENT
Start: 2024-08-06

## 2024-08-06 NOTE — TELEPHONE ENCOUNTER
Pt called regarding status of refill request for levothyroxine. Warm transfer to [place]. Patient advised that Dr Hand was not in office today, but message will be sent to see if refill can be filled today; if not, he will be in tomorrow. Patient understood.

## 2024-08-06 NOTE — TELEPHONE ENCOUNTER
Pt called because he is trying to get his levothyroxine filled.  PCP denied refill since he was seen by [facility] Dr. Hand.    Pt is now calling Dr. Hand and requesting the refill.  Pt had submitted a [employer] request and it is pending.    Pt is checking status as he is completely out of his medicine.    Please advise pt [phone] - if Dr Hand can order and when it is sent to pharmacy so he can .

## 2024-09-01 DIAGNOSIS — I10 ESSENTIAL HYPERTENSION: ICD-10-CM

## 2024-09-02 RX ORDER — AMLODIPINE AND BENAZEPRIL HYDROCHLORIDE 10; 20 MG/1; MG/1
1 CAPSULE ORAL DAILY
Qty: 90 CAPSULE | Refills: 1 | Status: SHIPPED | OUTPATIENT
Start: 2024-09-02

## 2024-09-03 ENCOUNTER — TELEPHONE (OUTPATIENT)
Age: 44
End: 2024-09-03

## 2024-09-03 DIAGNOSIS — E78.2 MIXED HYPERLIPIDEMIA: Primary | ICD-10-CM

## 2024-09-03 NOTE — TELEPHONE ENCOUNTER
Patient called to request any orders for labs needed before his physical on 9/9/24.  Please advise.  Thank you!

## 2024-09-06 ENCOUNTER — APPOINTMENT (OUTPATIENT)
Dept: LAB | Facility: CLINIC | Age: 44
End: 2024-09-06
Payer: COMMERCIAL

## 2024-09-06 DIAGNOSIS — E03.8 SUBCLINICAL HYPOTHYROIDISM: ICD-10-CM

## 2024-09-06 DIAGNOSIS — E78.2 MIXED HYPERLIPIDEMIA: ICD-10-CM

## 2024-09-06 LAB
ALBUMIN SERPL BCG-MCNC: 4.4 G/DL (ref 3.5–5)
ALP SERPL-CCNC: 50 U/L (ref 34–104)
ALT SERPL W P-5'-P-CCNC: 25 U/L (ref 7–52)
ANION GAP SERPL CALCULATED.3IONS-SCNC: 6 MMOL/L (ref 4–13)
AST SERPL W P-5'-P-CCNC: 19 U/L (ref 13–39)
BILIRUB SERPL-MCNC: 0.9 MG/DL (ref 0.2–1)
BUN SERPL-MCNC: 15 MG/DL (ref 5–25)
CALCIUM SERPL-MCNC: 9.5 MG/DL (ref 8.4–10.2)
CHLORIDE SERPL-SCNC: 102 MMOL/L (ref 96–108)
CHOLEST SERPL-MCNC: 163 MG/DL
CO2 SERPL-SCNC: 29 MMOL/L (ref 21–32)
CREAT SERPL-MCNC: 0.92 MG/DL (ref 0.6–1.3)
GFR SERPL CREATININE-BSD FRML MDRD: 100 ML/MIN/1.73SQ M
GLUCOSE P FAST SERPL-MCNC: 102 MG/DL (ref 65–99)
HDLC SERPL-MCNC: 48 MG/DL
LDLC SERPL CALC-MCNC: 90 MG/DL (ref 0–100)
POTASSIUM SERPL-SCNC: 4.2 MMOL/L (ref 3.5–5.3)
PROT SERPL-MCNC: 7.2 G/DL (ref 6.4–8.4)
SODIUM SERPL-SCNC: 137 MMOL/L (ref 135–147)
TRIGL SERPL-MCNC: 123 MG/DL

## 2024-09-06 PROCEDURE — 80053 COMPREHEN METABOLIC PANEL: CPT

## 2024-09-06 PROCEDURE — 80061 LIPID PANEL: CPT

## 2024-09-06 PROCEDURE — 36415 COLL VENOUS BLD VENIPUNCTURE: CPT

## 2024-09-09 ENCOUNTER — OFFICE VISIT (OUTPATIENT)
Dept: FAMILY MEDICINE CLINIC | Facility: CLINIC | Age: 44
End: 2024-09-09
Payer: COMMERCIAL

## 2024-09-09 VITALS
RESPIRATION RATE: 16 BRPM | HEART RATE: 82 BPM | BODY MASS INDEX: 28.67 KG/M2 | TEMPERATURE: 97.5 F | OXYGEN SATURATION: 98 % | DIASTOLIC BLOOD PRESSURE: 80 MMHG | WEIGHT: 189.2 LBS | SYSTOLIC BLOOD PRESSURE: 138 MMHG | HEIGHT: 68 IN

## 2024-09-09 DIAGNOSIS — Z00.00 ANNUAL PHYSICAL EXAM: Primary | ICD-10-CM

## 2024-09-09 DIAGNOSIS — E03.8 SUBCLINICAL HYPOTHYROIDISM: ICD-10-CM

## 2024-09-09 DIAGNOSIS — I10 BENIGN ESSENTIAL HYPERTENSION: ICD-10-CM

## 2024-09-09 DIAGNOSIS — E78.2 MIXED HYPERLIPIDEMIA: ICD-10-CM

## 2024-09-09 PROCEDURE — 99396 PREV VISIT EST AGE 40-64: CPT | Performed by: FAMILY MEDICINE

## 2024-09-09 RX ORDER — LEVOTHYROXINE SODIUM 25 UG/1
25 TABLET ORAL DAILY
Qty: 90 TABLET | Refills: 0 | Status: SHIPPED | OUTPATIENT
Start: 2024-09-09

## 2024-09-09 NOTE — PROGRESS NOTES
Adult Annual Physical  Name: Emerson Joyce      : 1980      MRN: [id number]  Encounter Provider: Marisa Pagan DO  Encounter Date: 2024   Encounter department: [facility]    Assessment & Plan   1. Annual physical exam  Assessment & Plan:  Will get flu and covid in fall  2. Subclinical hypothyroidism  -     TSH, 3rd generation with Free T4 reflex; Future  -     TSH, 3rd generation with Free T4 reflex; Future; Expected date: 2025  -     levothyroxine (Euthyrox) 25 mcg tablet; Take 1 tablet (25 mcg total) by mouth daily  3. Benign essential hypertension  -     CBC; Future; Expected date: 2025  -     Comprehensive metabolic panel; Future; Expected date: 2025  4. Mixed hyperlipidemia  -     Lipid Panel with Direct LDL reflex; Future; Expected date: 2025    Immunizations and preventive care screenings were discussed with patient today. Appropriate education was printed on patient's after visit summary.        Counseling:  Dental Health: discussed importance of regular tooth brushing, flossing, and dental visits.         History of Present Illness     Adult Annual Physical:  Patient presents for annual physical. Here for wellness  Start levo 25  No change.     Diet and Physical Activity:  - Diet/Nutrition: well balanced diet.  - Exercise: 3-4 times a week on average, walking and strength training exercises.    General Health:  - Sleep: sleeps well.  - Hearing: normal hearing right ear and normal hearing left ear.  - Vision: vision problems and wears glasses.  - Dental: regular dental visits.    Advanced Care Planning:  - Has an advanced directive?: no    - Has a durable medical POA?: no    - ACP document given to patient?: no      Review of Systems   Constitutional: Negative.    HENT: Negative.     Eyes: Negative.    Respiratory: Negative.     Cardiovascular: Negative.    Gastrointestinal: Negative.    Endocrine: Negative.    Genitourinary: Negative.   "  Musculoskeletal: Negative.    Skin: Negative.    Allergic/Immunologic: Negative.    Neurological: Negative.    Hematological: Negative.    Psychiatric/Behavioral: Negative.       Medical History Reviewed by provider this encounter:         Objective     /80 (BP Location: Left arm, Patient Position: Sitting, Cuff Size: Large)   Pulse 82   Temp 97.5 °F (36.4 °C) (Tympanic)   Resp 16   Ht 5' 8.31\" (1.735 m)   Wt 85.8 kg (189 lb 3.2 oz)   SpO2 98%   BMI 28.51 kg/m²     Physical Exam  Vitals and nursing note reviewed.   Constitutional:       Appearance: Normal appearance. He is well-developed.   HENT:      Head: Normocephalic and atraumatic.      Right Ear: External ear normal.      Left Ear: External ear normal.      Nose: Nose normal.   Eyes:      Extraocular Movements: Extraocular movements intact.      Conjunctiva/sclera: Conjunctivae normal.      Pupils: Pupils are equal, round, and reactive to light.   Cardiovascular:      Rate and Rhythm: Normal rate and regular rhythm.      Heart sounds: Normal heart sounds.   Pulmonary:      Effort: Pulmonary effort is normal.      Breath sounds: Normal breath sounds.   Abdominal:      General: Bowel sounds are normal.      Palpations: Abdomen is soft.   Musculoskeletal:         General: Normal range of motion.      Cervical back: Normal range of motion and neck supple.   Skin:     General: Skin is warm and dry.      Capillary Refill: Capillary refill takes less than 2 seconds.   Neurological:      Mental Status: He is alert and oriented to person, place, and time.   Psychiatric:         Behavior: Behavior normal.         Thought Content: Thought content normal.         Judgment: Judgment normal.         "

## 2024-09-09 NOTE — PATIENT INSTRUCTIONS
"Patient Education     Routine physical for adults   The Basics   Written by the doctors and editors at [facility]   What is a physical? -- A physical is a routine visit, or \"check-up,\" with your doctor. You might also hear it called a \"wellness visit\" or \"preventive visit.\"  During each visit, the doctor will:   Ask about your physical and mental health   Ask about your habits, behaviors, and lifestyle   Do an exam   Give you vaccines if needed   Talk to you about any medicines you take   Give advice about your health   Answer your questions  Getting regular check-ups is an important part of taking care of your health. It can help your doctor find and treat any problems you have. But it's also important for preventing health problems.  A routine physical is different from a \"sick visit.\" A sick visit is when you see a doctor because of a health concern or problem. Since physicals are scheduled ahead of time, you can think about what you want to ask the doctor.  How often should I get a physical? -- It depends on your age and health. In general, for people age 21 years and older:   If you are younger than 50 years, you might be able to get a physical every 3 years.   If you are 50 years or older, your doctor might recommend a physical every year.  If you have an ongoing health condition, like diabetes or high blood pressure, your doctor will probably want to see you more often.  What happens during a physical? -- In general, each visit will include:   Physical exam - The doctor or nurse will check your height, weight, heart rate, and blood pressure. They will also look at your eyes and ears. They will ask about how you are feeling and whether you have any symptoms that bother you.   Medicines - It's a good idea to bring a list of all the medicines you take to each doctor visit. Your doctor will talk to you about your medicines and answer any questions. Tell them if you are having any side effects that bother you. You " "should also tell them if you are having trouble paying for any of your medicines.   Habits and behaviors - This includes:   Your diet   Your exercise habits   Whether you smoke, drink alcohol, or use drugs   Whether you are sexually active   Whether you feel safe at home  Your doctor will talk to you about things you can do to improve your health and lower your risk of health problems. They will also offer help and support. For example, if you want to quit smoking, they can give you advice and might prescribe medicines. If you want to improve your diet or get more physical activity, they can help you with this, too.   Lab tests, if needed - The tests you get will depend on your age and situation. For example, your doctor might want to check your:   Cholesterol   Blood sugar   Iron level   Vaccines - The recommended vaccines will depend on your age, health, and what vaccines you already had. Vaccines are very important because they can prevent certain serious or deadly infections.   Discussion of screening - \"Screening\" means checking for diseases or other health problems before they cause symptoms. Your doctor can recommend screening based on your age, risk, and preferences. This might include tests to check for:   Cancer, such as breast, prostate, cervical, ovarian, colorectal, prostate, lung, or skin cancer   Sexually transmitted infections, such as chlamydia and gonorrhea   Mental health conditions like depression and anxiety  Your doctor will talk to you about the different types of screening tests. They can help you decide which screenings to have. They can also explain what the results might mean.   Answering questions - The physical is a good time to ask the doctor or nurse questions about your health. If needed, they can refer you to other doctors or specialists, too.  Adults older than 65 years often need other care, too. As you get older, your doctor will talk to you about:   How to prevent falling at " home   Hearing or vision tests   Memory testing   How to take your medicines safely   Making sure that you have the help and support you need at home  All topics are updated as new evidence becomes available and our peer review process is complete.  This topic retrieved from [employer] on: May 02, 2024.  Topic 312214 Version 1.0  Release: 32.4.3 - C32.122  © 2024 UpToDate, Inc. and/or its affiliates. All rights reserved.  Consumer Information Use and Disclaimer   Disclaimer: This generalized information is a limited summary of diagnosis, treatment, and/or medication information. It is not meant to be comprehensive and should be used as a tool to help the user understand and/or assess potential diagnostic and treatment options. It does NOT include all information about conditions, treatments, medications, side effects, or risks that may apply to a specific patient. It is not intended to be medical advice or a substitute for the medical advice, diagnosis, or treatment of a health care provider based on the health care provider's examination and assessment of a patient's specific and unique circumstances. Patients must speak with a health care provider for complete information about their health, medical questions, and treatment options, including any risks or benefits regarding use of medications. This information does not endorse any treatments or medications as safe, effective, or approved for treating a specific patient. UpToDate, Inc. and its affiliates disclaim any warranty or liability relating to this information or the use thereof.The use of this information is governed by the Terms of Use, available at https://www.woltersLamahuiuwer.com/en/know/clinical-effectiveness-terms. 2024© UpToDate, Inc. and its affiliates and/or licensors. All rights reserved.  Copyright   © 2024 UpToDate, Inc. and/or its affiliates. All rights reserved.

## 2024-10-11 ENCOUNTER — TELEPHONE (OUTPATIENT)
Dept: FAMILY MEDICINE CLINIC | Facility: CLINIC | Age: 44
End: 2024-10-11

## 2024-11-14 DIAGNOSIS — E55.9 VITAMIN D DEFICIENCY: ICD-10-CM

## 2024-11-14 RX ORDER — ERGOCALCIFEROL 1.25 MG/1
50000 CAPSULE, LIQUID FILLED ORAL WEEKLY
Qty: 12 CAPSULE | Refills: 3 | Status: SHIPPED | OUTPATIENT
Start: 2024-11-14

## 2024-11-25 ENCOUNTER — PATIENT MESSAGE (OUTPATIENT)
Dept: FAMILY MEDICINE CLINIC | Facility: CLINIC | Age: 44
End: 2024-11-25

## 2024-11-26 ENCOUNTER — APPOINTMENT (OUTPATIENT)
Dept: LAB | Facility: CLINIC | Age: 44
End: 2024-11-26
Payer: COMMERCIAL

## 2024-11-26 ENCOUNTER — TELEPHONE (OUTPATIENT)
Age: 44
End: 2024-11-26

## 2024-11-26 ENCOUNTER — OFFICE VISIT (OUTPATIENT)
Dept: FAMILY MEDICINE CLINIC | Facility: CLINIC | Age: 44
End: 2024-11-26
Payer: COMMERCIAL

## 2024-11-26 VITALS
BODY MASS INDEX: 28.4 KG/M2 | DIASTOLIC BLOOD PRESSURE: 80 MMHG | TEMPERATURE: 97.4 F | WEIGHT: 187.4 LBS | RESPIRATION RATE: 16 BRPM | HEIGHT: 68 IN | SYSTOLIC BLOOD PRESSURE: 122 MMHG | OXYGEN SATURATION: 97 % | HEART RATE: 70 BPM

## 2024-11-26 DIAGNOSIS — Z11.3 SCREENING EXAMINATION FOR STI: ICD-10-CM

## 2024-11-26 DIAGNOSIS — R35.0 BENIGN PROSTATIC HYPERPLASIA WITH URINARY FREQUENCY: ICD-10-CM

## 2024-11-26 DIAGNOSIS — R30.0 DYSURIA: ICD-10-CM

## 2024-11-26 DIAGNOSIS — F41.1 GENERALIZED ANXIETY DISORDER: ICD-10-CM

## 2024-11-26 DIAGNOSIS — J02.9 SORE THROAT: Primary | ICD-10-CM

## 2024-11-26 DIAGNOSIS — N40.1 BENIGN PROSTATIC HYPERPLASIA WITH URINARY FREQUENCY: ICD-10-CM

## 2024-11-26 LAB
HCV AB SER QL: NORMAL
HIV 1+2 AB+HIV1 P24 AG SERPL QL IA: NORMAL
HIV 2 AB SERPL QL IA: NORMAL
HIV1 AB SERPL QL IA: NORMAL
HIV1 P24 AG SERPL QL IA: NORMAL
S PYO AG THROAT QL: NEGATIVE
SL AMB  POCT GLUCOSE, UA: NORMAL
SL AMB LEUKOCYTE ESTERASE,UA: NORMAL
SL AMB POCT BILIRUBIN,UA: NORMAL
SL AMB POCT BLOOD,UA: NORMAL
SL AMB POCT CLARITY,UA: CLEAR
SL AMB POCT COLOR,UA: YELLOW
SL AMB POCT KETONES,UA: NORMAL
SL AMB POCT NITRITE,UA: NORMAL
SL AMB POCT PH,UA: 7
SL AMB POCT SPECIFIC GRAVITY,UA: 1
SL AMB POCT URINE PROTEIN: NORMAL
SL AMB POCT UROBILINOGEN: NORMAL
TREPONEMA PALLIDUM IGG+IGM AB [PRESENCE] IN SERUM OR PLASMA BY IMMUNOASSAY: NORMAL
TSH SERPL DL<=0.05 MIU/L-ACNC: 4.28 UIU/ML (ref 0.45–4.5)

## 2024-11-26 PROCEDURE — 86803 HEPATITIS C AB TEST: CPT

## 2024-11-26 PROCEDURE — 87086 URINE CULTURE/COLONY COUNT: CPT | Performed by: FAMILY MEDICINE

## 2024-11-26 PROCEDURE — 87880 STREP A ASSAY W/OPTIC: CPT | Performed by: FAMILY MEDICINE

## 2024-11-26 PROCEDURE — 86780 TREPONEMA PALLIDUM: CPT

## 2024-11-26 PROCEDURE — 87070 CULTURE OTHR SPECIMN AEROBIC: CPT | Performed by: FAMILY MEDICINE

## 2024-11-26 PROCEDURE — 99213 OFFICE O/P EST LOW 20 MIN: CPT | Performed by: FAMILY MEDICINE

## 2024-11-26 PROCEDURE — 81002 URINALYSIS NONAUTO W/O SCOPE: CPT | Performed by: FAMILY MEDICINE

## 2024-11-26 PROCEDURE — 87389 HIV-1 AG W/HIV-1&-2 AB AG IA: CPT

## 2024-11-26 PROCEDURE — 36415 COLL VENOUS BLD VENIPUNCTURE: CPT

## 2024-11-26 PROCEDURE — 87591 N.GONORRHOEAE DNA AMP PROB: CPT

## 2024-11-26 PROCEDURE — 87491 CHLMYD TRACH DNA AMP PROBE: CPT

## 2024-11-26 NOTE — PROGRESS NOTES
Name: Emerson Joyce      : 1980      MRN: [id number]  Encounter Provider: Jose E Joy DO  Encounter Date: 2024   Encounter department: [facility]    Assessment & Plan  Sore throat  -POCT strep negative.  -Will send for throat culture.  -Discussed symptomatic management.  Patient to follow-up if no improvement.  Orders:    POCT rapid ANTIGEN strepA    Throat culture    Dysuria    Orders:    POCT urine dip    Urine culture    Screening examination for STI    Orders:    HIV 1/2 AG/AB w Reflex [facility] for 2 yr old and above; Future    Hepatitis C antibody; Future    Chlamydia/GC amplified DNA by PCR; Future    RPR-Syphilis Screening (Total Syphilis IGG/IGM); Future    Benign prostatic hyperplasia with urinary frequency  -Noting some increased frequency.  POCT urine dip negative.  No nodules on exam.  -Will refer to urology for further evaluation and recommendations.    Orders:    Ambulatory Referral to Urology; Future         History of Present Illness     Emerson is a 44-year-old male who presents today to discuss concern with frequency in urination and some discomfort with urination.  He denies any blood in his urine.  Denies any fevers, chills, nausea or vomiting.  Denies any back pain.  Denies any unwanted weight loss or night sweats.  He also notes he has had some symptoms of sore throat and congestion.  This started yesterday.  Denies any significant cough, shortness of breath, wheezing, chest tightness, palpitations.        Review of Systems   Constitutional:  Negative for chills and fever.   HENT:  Positive for congestion and sore throat. Negative for ear pain.    Eyes:  Negative for pain and visual disturbance.   Respiratory:  Negative for cough and shortness of breath.    Cardiovascular:  Negative for chest pain and palpitations.   Gastrointestinal:  Negative for abdominal pain and vomiting.   Genitourinary:  Positive for dysuria and frequency. Negative for hematuria.    Musculoskeletal:  Negative for arthralgias, back pain and neck pain.   Skin:  Negative for color change and rash.   Neurological:  Negative for seizures and syncope.   Psychiatric/Behavioral:  Negative for confusion and sleep disturbance. The patient is not nervous/anxious.    All other systems reviewed and are negative.    Past Medical History:   Diagnosis Date    Acute non-recurrent maxillary sinusitis 2/4/2020    Acute swimmer's ear of left side 2/11/2020    Allergic     Anxiety     GERD (gastroesophageal reflux disease)     Hyperlipidemia     Hypertension     Impacted cerumen, right ear 2/11/2020    Retinal hemorrhage of right eye 1/4/2023     Past Surgical History:   Procedure Laterality Date    TONSILECTOMY AND ADNOIDECTOMY      TONSILLECTOMY      UPPER GASTROINTESTINAL ENDOSCOPY       Family History   Problem Relation Age of Onset    Heart disease Father     Diabetes Father     Hypertension Father     Hypertension Brother     Colon cancer Maternal Uncle     Breast cancer Paternal Grandmother     Heart disease Paternal Grandfather     Other Paternal Grandfather         Acute myocardial infarction      Social History     Tobacco Use    Smoking status: Never     Passive exposure: Never    Smokeless tobacco: Never   Vaping Use    Vaping status: Never Used   Substance and Sexual Activity    Alcohol use: Yes     Comment: 1 per week    Drug use: No    Sexual activity: Yes     Current Outpatient Medications on File Prior to Visit   Medication Sig    amLODIPine-benazepril (LOTREL) 10-20 MG per capsule TAKE 1 CAPSULE BY MOUTH EVERY DAY    ergocalciferol (VITAMIN D2) 50,000 units Take 1 capsule (50,000 Units total) by mouth once a week    levothyroxine (Euthyrox) 25 mcg tablet Take 1 tablet (25 mcg total) by mouth daily    LORazepam (ATIVAN) 0.5 mg tablet Take 1 tablet (0.5 mg total) by mouth daily at bedtime    rosuvastatin (CRESTOR) 10 MG tablet Take 1 tablet (10 mg total) by mouth daily     Allergies   Allergen  "Reactions    Ceclor [Cefaclor]     Pollen Extract      Immunization History   Administered Date(s) Administered    COVID-19 MODERNA VACC 0.5 ML IM 01/21/2021, 02/18/2021, 11/08/2021    COVID-19 Moderna Vac BIVALENT 12 Yr+ IM 0.5 ML 11/30/2022    COVID-19 Pfizer mRNA vacc PF anthony-sucrose 12 yr and older (Comirnaty) 11/28/2023, 11/16/2024    INFLUENZA 12/14/2016, 10/13/2017, 10/31/2018, 11/01/2019, 10/30/2020, 10/14/2022    Influenza Injectable, MDCK, Preservative Free, 0.5 mL 09/21/2024    Influenza Quadrivalent Preservative Free 3 years and older IM 01/15/2014, 12/14/2016, 10/13/2017    Influenza, injectable, quadrivalent, preservative free 0.5 mL 11/21/2019, 09/22/2021, 10/14/2023    Influenza, seasonal, injectable 02/11/2016    Tdap 06/28/2013    Tuberculin Skin Test-PPD Intradermal 05/20/2016     Objective   /80 (BP Location: Left arm, Patient Position: Sitting, Cuff Size: Standard)   Pulse 70   Temp (!) 97.4 °F (36.3 °C) (Tympanic)   Resp 16   Ht 5' 8.31\" (1.735 m)   Wt 85 kg (187 lb 6.4 oz)   SpO2 97%   BMI 28.24 kg/m²     Physical Exam  Vitals and nursing note reviewed.   Constitutional:       General: He is not in acute distress.     Appearance: Normal appearance.   HENT:      Head: Normocephalic and atraumatic.      Right Ear: Tympanic membrane and external ear normal.      Left Ear: Tympanic membrane and external ear normal.      Nose: Nose normal.      Mouth/Throat:      Mouth: Mucous membranes are moist.   Eyes:      Extraocular Movements: Extraocular movements intact.      Conjunctiva/sclera: Conjunctivae normal.      Pupils: Pupils are equal, round, and reactive to light.   Cardiovascular:      Rate and Rhythm: Normal rate and regular rhythm.      Pulses: Normal pulses.      Heart sounds: No murmur heard.  Pulmonary:      Effort: Pulmonary effort is normal.      Breath sounds: Normal breath sounds. No wheezing, rhonchi or rales.   Abdominal:      General: Bowel sounds are normal.      " Palpations: Abdomen is soft.      Tenderness: There is no abdominal tenderness. There is no guarding.   Genitourinary:     Comments: Prostate enlarged, no nodules noted.  Musculoskeletal:         General: Normal range of motion.      Cervical back: Normal range of motion.      Right lower leg: No edema.      Left lower leg: No edema.   Lymphadenopathy:      Cervical: No cervical adenopathy.   Skin:     General: Skin is warm.      Capillary Refill: Capillary refill takes less than 2 seconds.   Neurological:      General: No focal deficit present.      Mental Status: He is alert and oriented to person, place, and time.   Psychiatric:         Mood and Affect: Mood normal.         Behavior: Behavior normal.

## 2024-11-26 NOTE — TELEPHONE ENCOUNTER
New Patient    What is the reason for the patient’s appointment?: Benign prostatic hyperplasia with urinary frequency     What office location does the patient prefer?: [place]    Does patient have Imaging/Lab Results:no     Have patient records been requested?:  If No, are the records showing in Epic:

## 2024-11-27 LAB
BACTERIA UR CULT: NORMAL
C TRACH DNA SPEC QL NAA+PROBE: NEGATIVE
N GONORRHOEA DNA SPEC QL NAA+PROBE: NEGATIVE

## 2024-11-27 RX ORDER — CITALOPRAM HYDROBROMIDE 20 MG/1
20 TABLET ORAL DAILY
Qty: 90 TABLET | Refills: 1 | Status: SHIPPED | OUTPATIENT
Start: 2024-11-27

## 2024-11-28 ENCOUNTER — RESULTS FOLLOW-UP (OUTPATIENT)
Dept: FAMILY MEDICINE CLINIC | Facility: CLINIC | Age: 44
End: 2024-11-28

## 2024-11-28 LAB — BACTERIA THROAT CULT: NORMAL

## 2024-11-29 ENCOUNTER — OFFICE VISIT (OUTPATIENT)
Dept: URGENT CARE | Facility: MEDICAL CENTER | Age: 44
End: 2024-11-29
Payer: COMMERCIAL

## 2024-11-29 ENCOUNTER — RESULTS FOLLOW-UP (OUTPATIENT)
Dept: FAMILY MEDICINE CLINIC | Facility: CLINIC | Age: 44
End: 2024-11-29

## 2024-11-29 VITALS
TEMPERATURE: 98.3 F | SYSTOLIC BLOOD PRESSURE: 131 MMHG | BODY MASS INDEX: 28.18 KG/M2 | RESPIRATION RATE: 18 BRPM | DIASTOLIC BLOOD PRESSURE: 76 MMHG | OXYGEN SATURATION: 97 % | HEART RATE: 90 BPM | WEIGHT: 187 LBS

## 2024-11-29 DIAGNOSIS — J06.9 VIRAL URI: Primary | ICD-10-CM

## 2024-11-29 DIAGNOSIS — J02.9 SORE THROAT: ICD-10-CM

## 2024-11-29 LAB
SARS-COV-2 AG UPPER RESP QL IA: NEGATIVE
VALID CONTROL: NORMAL

## 2024-11-29 PROCEDURE — 87636 SARSCOV2 & INF A&B AMP PRB: CPT | Performed by: PHYSICIAN ASSISTANT

## 2024-11-29 PROCEDURE — 87811 SARS-COV-2 COVID19 W/OPTIC: CPT | Performed by: PHYSICIAN ASSISTANT

## 2024-11-29 PROCEDURE — 99202 OFFICE O/P NEW SF 15 MIN: CPT | Performed by: PHYSICIAN ASSISTANT

## 2024-11-29 NOTE — PATIENT INSTRUCTIONS
Reviewed prior throat culture results.  Discussed negative COVID test with the patient today we can do PCR send out for COVID/flu today.  We will contact the patient with any positive results.  Continue with Tylenol and ibuprofen as needed for any fevers.  Take over the counter medications as needed for symptomatic management.       Follow up with PCP in 3-5 days.  Proceed to  ER if symptoms worsen.    If tests are performed, our office will contact you with results only if changes need to made to the care plan discussed with you at the visit. You can review your full results on [facility].

## 2024-11-29 NOTE — RESULT ENCOUNTER NOTE
Kranthi Norman,    All of your lab testing has come back negative.  Please let me know if you have any other questions.    Thank you  -Dr. TORRE

## 2024-11-29 NOTE — PROGRESS NOTES
[facility]        NAME: Emerson Joyce is a 44 y.o. male  : 1980    MRN: [id number]  DATE: 2024  TIME: 9:47 AM    Assessment and Plan   Viral URI [J06.9]  1. Viral URI  Covid/Flu- Office Collect Normal      2. Sore throat  Poct Covid 19 Rapid Antigen Test            Patient Instructions     Patient Instructions   Reviewed prior throat culture results.  Discussed negative COVID test with the patient today we can do PCR send out for COVID/flu today.  We will contact the patient with any positive results.  Continue with Tylenol and ibuprofen as needed for any fevers.  Take over the counter medications as needed for symptomatic management.       Follow up with PCP in 3-5 days.  Proceed to  ER if symptoms worsen.    If tests are performed, our office will contact you with results only if changes need to made to the care plan discussed with you at the visit. You can review your full results on [facility].        Chief Complaint     Chief Complaint   Patient presents with    Sore Throat     Cough, nasal congestion, sore throat; patient states he was evaluated at primary care and received strep swab which was negative; requesting flu/covid testing     Fever     101.5 fever yesterday; tylenol taken around 7 am         History of Present Illness       Sore Throat   This is a new problem. Episode onset: 3 days ago. The problem has been unchanged. The maximum temperature recorded prior to his arrival was 101 - 101.9 F. The pain is mild. Associated symptoms include congestion and coughing. Pertinent negatives include no ear pain, shortness of breath or vomiting. He has tried acetaminophen for the symptoms.   Fever  This is a new problem. The problem occurs 2 to 4 times per day. The problem has been waxing and waning. Associated symptoms include congestion, coughing, fatigue, a fever and a sore throat. Pertinent negatives include no vomiting. The symptoms are aggravated by exertion. He has  tried acetaminophen for the symptoms.       Review of Systems   Review of Systems   Constitutional:  Positive for fatigue and fever.   HENT:  Positive for congestion and sore throat. Negative for ear pain.    Respiratory:  Positive for cough. Negative for shortness of breath.    Gastrointestinal:  Negative for vomiting.   All other systems reviewed and are negative.        Current Medications       Current Outpatient Medications:     amLODIPine-benazepril (LOTREL) 10-20 MG per capsule, TAKE 1 CAPSULE BY MOUTH EVERY DAY, Disp: 90 capsule, Rfl: 1    citalopram (CeleXA) 20 mg tablet, TAKE 1 TABLET BY MOUTH EVERY DAY, Disp: 90 tablet, Rfl: 1    ergocalciferol (VITAMIN D2) 50,000 units, Take 1 capsule (50,000 Units total) by mouth once a week, Disp: 12 capsule, Rfl: 3    levothyroxine (Euthyrox) 25 mcg tablet, Take 1 tablet (25 mcg total) by mouth daily, Disp: 90 tablet, Rfl: 0    LORazepam (ATIVAN) 0.5 mg tablet, Take 1 tablet (0.5 mg total) by mouth daily at bedtime, Disp: 30 tablet, Rfl: 5    rosuvastatin (CRESTOR) 10 MG tablet, Take 1 tablet (10 mg total) by mouth daily, Disp: 90 tablet, Rfl: 3    Current Allergies     Allergies as of 11/29/2024 - Reviewed 11/29/2024   Allergen Reaction Noted    Ceclor [cefaclor]  10/10/2018    Pollen extract  03/07/2014            The following portions of the patient's history were reviewed and updated as appropriate: allergies, current medications, past family history, past medical history, past social history, past surgical history and problem list.     Past Medical History:   Diagnosis Date    Acute non-recurrent maxillary sinusitis 2/4/2020    Acute swimmer's ear of left side 2/11/2020    Allergic     Anxiety     GERD (gastroesophageal reflux disease)     Hyperlipidemia     Hypertension     Impacted cerumen, right ear 2/11/2020    Retinal hemorrhage of right eye 1/4/2023       Past Surgical History:   Procedure Laterality Date    TONSILECTOMY AND ADNOIDECTOMY      TONSILLECTOMY       UPPER GASTROINTESTINAL ENDOSCOPY         Family History   Problem Relation Age of Onset    Heart disease Father     Diabetes Father     Hypertension Father     Hypertension Brother     Colon cancer Maternal Uncle     Breast cancer Paternal Grandmother     Heart disease Paternal Grandfather     Other Paternal Grandfather         Acute myocardial infarction          Medications have been verified.        Objective   /76   Pulse 90   Temp 98.3 °F (36.8 °C) (Temporal)   Resp 18   Wt 84.8 kg (187 lb)   SpO2 97%   BMI 28.18 kg/m²        Physical Exam     Physical Exam  Vitals and nursing note reviewed.   Constitutional:       Appearance: Normal appearance.   HENT:      Right Ear: Tympanic membrane, ear canal and external ear normal.      Left Ear: Tympanic membrane, ear canal and external ear normal.      Nose: Nose normal.      Mouth/Throat:      Mouth: Mucous membranes are moist.   Cardiovascular:      Rate and Rhythm: Normal rate and regular rhythm.   Pulmonary:      Effort: Pulmonary effort is normal.      Breath sounds: Normal breath sounds.   Skin:     General: Skin is warm and dry.   Neurological:      General: No focal deficit present.      Mental Status: He is alert and oriented to person, place, and time.   Psychiatric:         Mood and Affect: Mood normal.         Behavior: Behavior normal.

## 2024-11-30 ENCOUNTER — RESULTS FOLLOW-UP (OUTPATIENT)
Dept: URGENT CARE | Facility: MEDICAL CENTER | Age: 44
End: 2024-11-30

## 2024-11-30 LAB
FLUAV RNA RESP QL NAA+PROBE: NEGATIVE
FLUBV RNA RESP QL NAA+PROBE: NEGATIVE
SARS-COV-2 RNA RESP QL NAA+PROBE: NEGATIVE

## 2024-12-02 DIAGNOSIS — J40 BRONCHITIS: ICD-10-CM

## 2024-12-02 DIAGNOSIS — R05.9 COUGH, UNSPECIFIED TYPE: Primary | ICD-10-CM

## 2024-12-02 RX ORDER — FLUTICASONE PROPIONATE AND SALMETEROL XINAFOATE 45; 21 UG/1; UG/1
2 AEROSOL, METERED RESPIRATORY (INHALATION) 2 TIMES DAILY
Qty: 12 G | Refills: 0 | Status: SHIPPED | OUTPATIENT
Start: 2024-12-02

## 2024-12-02 RX ORDER — PROMETHAZINE HYDROCHLORIDE AND CODEINE PHOSPHATE 6.25; 1 MG/5ML; MG/5ML
5 SYRUP ORAL EVERY 4 HOURS PRN
Qty: 118 ML | Refills: 0 | Status: SHIPPED | OUTPATIENT
Start: 2024-12-02

## 2024-12-02 RX ORDER — BENZONATATE 200 MG/1
200 CAPSULE ORAL 3 TIMES DAILY PRN
Qty: 20 CAPSULE | Refills: 0 | Status: SHIPPED | OUTPATIENT
Start: 2024-12-02

## 2024-12-02 NOTE — TELEPHONE ENCOUNTER
Pt called back. He is asking if an alternative can be sent to his pharmacy [facility] since [facility] does not carry the codeine prescription

## 2024-12-02 NOTE — TELEPHONE ENCOUNTER
Patient called and stated he was informed by the pharmacy that they no longer carry the cough medicine prescribed (promethazine-codeine (PHENERGAN WITH CODEINE) 6.25-10 mg/5 mL syrup).   [facility] told him they sent over a fax this morning requesting an alternative to be prescribed.  Patient stated he has not been able to sleep for the past 3 nights due to the cough and would like to know if another cough medication can be sent to [facility].  Please advise.  Thank you!

## 2024-12-11 DIAGNOSIS — J01.00 ACUTE NON-RECURRENT MAXILLARY SINUSITIS: Primary | ICD-10-CM

## 2024-12-28 ENCOUNTER — OFFICE VISIT (OUTPATIENT)
Dept: URGENT CARE | Facility: MEDICAL CENTER | Age: 44
End: 2024-12-28
Payer: COMMERCIAL

## 2024-12-28 VITALS
HEART RATE: 70 BPM | DIASTOLIC BLOOD PRESSURE: 77 MMHG | WEIGHT: 187 LBS | OXYGEN SATURATION: 98 % | TEMPERATURE: 98.7 F | RESPIRATION RATE: 20 BRPM | HEIGHT: 68 IN | SYSTOLIC BLOOD PRESSURE: 127 MMHG | BODY MASS INDEX: 28.34 KG/M2

## 2024-12-28 DIAGNOSIS — J11.1 INFLUENZA-LIKE ILLNESS: ICD-10-CM

## 2024-12-28 DIAGNOSIS — R50.9 FEVER, UNSPECIFIED FEVER CAUSE: Primary | ICD-10-CM

## 2024-12-28 LAB — S PYO AG THROAT QL: NEGATIVE

## 2024-12-28 PROCEDURE — 87636 SARSCOV2 & INF A&B AMP PRB: CPT | Performed by: PHYSICIAN ASSISTANT

## 2024-12-28 PROCEDURE — 87880 STREP A ASSAY W/OPTIC: CPT | Performed by: PHYSICIAN ASSISTANT

## 2024-12-28 PROCEDURE — 99213 OFFICE O/P EST LOW 20 MIN: CPT | Performed by: PHYSICIAN ASSISTANT

## 2024-12-28 RX ORDER — BENZONATATE 200 MG/1
200 CAPSULE ORAL 3 TIMES DAILY PRN
Qty: 20 CAPSULE | Refills: 0 | Status: SHIPPED | OUTPATIENT
Start: 2024-12-28

## 2024-12-28 NOTE — PATIENT INSTRUCTIONS
Increase fluids  Motrin as needed for fever, body aches  Take Tessalon 200mg every 8 hours as needed for cough  Follow-up with PCP if symptoms worsen or persist.

## 2024-12-28 NOTE — PROGRESS NOTES
[facility]        NAME: Emerson Joyce is a 44 y.o. male  : 1980    MRN: [id number]  DATE: 2024  TIME: 4:42 PM    Assessment and Plan   Fever, unspecified fever cause [R50.9]  1. Fever, unspecified fever cause  POCT rapid ANTIGEN strepA    Covid/Flu- Office Collect Normal    Covid/Flu- Office Collect Normal      2. Influenza-like illness  benzonatate (TESSALON) 200 MG capsule            Patient Instructions     Increase fluids  Motrin as needed for fever, body aches  Take Tessalon 200mg every 8 hours as needed for cough  Follow-up with PCP if symptoms worsen or persist.       If tests have been performed at [facility], our office will contact you with results if changes need to be made to the care plan discussed with you at the visit.  [facility]    Chief Complaint     Chief Complaint   Patient presents with    Fever    Cold Like Symptoms    Sinusitis     Feverish, sinus pressure, cough, congestion, runny nose; states he believes it migrated into his chest; symptoms initially started 4 days ago          History of Present Illness       Emerson is a 44-year-old male who presents with a 4-day history of sore throat, nasal congestion, chills, body aches and cough.  Patient reports no vomiting or diarrhea since the onset of illness.  He reports his children have similar symptoms.    Fever  Associated symptoms include chills, congestion, coughing, fatigue, a fever and a sore throat.   Sinusitis  Associated symptoms include chills, congestion, coughing and a sore throat.       Review of Systems   Review of Systems   Constitutional:  Positive for chills, fatigue and fever.   HENT:  Positive for congestion, postnasal drip and sore throat.    Respiratory:  Positive for cough.    Gastrointestinal: Negative.          Current Medications       Current Outpatient Medications:     benzonatate (TESSALON) 200 MG capsule, Take 1 capsule (200 mg total) by  mouth 3 (three) times a day as needed for cough, Disp: 20 capsule, Rfl: 0    amLODIPine-benazepril (LOTREL) 10-20 MG per capsule, TAKE 1 CAPSULE BY MOUTH EVERY DAY, Disp: 90 capsule, Rfl: 1    benzonatate (TESSALON) 200 MG capsule, Take 1 capsule (200 mg total) by mouth 3 (three) times a day as needed for cough, Disp: 20 capsule, Rfl: 0    citalopram (CeleXA) 20 mg tablet, TAKE 1 TABLET BY MOUTH EVERY DAY, Disp: 90 tablet, Rfl: 1    ergocalciferol (VITAMIN D2) 50,000 units, Take 1 capsule (50,000 Units total) by mouth once a week, Disp: 12 capsule, Rfl: 3    fluticasone-salmeterol (ADVAIR HFA) 45-21 MCG/ACT inhaler, Inhale 2 puffs 2 (two) times a day Rinse mouth after use., Disp: 12 g, Rfl: 0    levothyroxine (Euthyrox) 25 mcg tablet, Take 1 tablet (25 mcg total) by mouth daily, Disp: 90 tablet, Rfl: 0    LORazepam (ATIVAN) 0.5 mg tablet, Take 1 tablet (0.5 mg total) by mouth daily at bedtime, Disp: 30 tablet, Rfl: 5    promethazine-codeine (PHENERGAN WITH CODEINE) 6.25-10 mg/5 mL syrup, Take 5 mL by mouth every 4 (four) hours as needed for cough, Disp: 118 mL, Rfl: 0    rosuvastatin (CRESTOR) 10 MG tablet, Take 1 tablet (10 mg total) by mouth daily, Disp: 90 tablet, Rfl: 3    Current Allergies     Allergies as of 12/28/2024 - Reviewed 12/28/2024   Allergen Reaction Noted    Ceclor [cefaclor]  10/10/2018    Pollen extract  03/07/2014            The following portions of the patient's history were reviewed and updated as appropriate: allergies, current medications, past family history, past medical history, past social history, past surgical history and problem list.     Past Medical History:   Diagnosis Date    Acute non-recurrent maxillary sinusitis 2/4/2020    Acute swimmer's ear of left side 2/11/2020    Allergic     Anxiety     GERD (gastroesophageal reflux disease)     Hyperlipidemia     Hypertension     Impacted cerumen, right ear 2/11/2020    Retinal hemorrhage of right eye 1/4/2023       Past Surgical  "History:   Procedure Laterality Date    TONSILECTOMY AND ADNOIDECTOMY      TONSILLECTOMY      UPPER GASTROINTESTINAL ENDOSCOPY         Family History   Problem Relation Age of Onset    Heart disease Father     Diabetes Father     Hypertension Father     Hypertension Brother     Colon cancer Maternal Uncle     Breast cancer Paternal Grandmother     Heart disease Paternal Grandfather     Other Paternal Grandfather         Acute myocardial infarction          Medications have been verified.        Objective   /77   Pulse 70   Temp 98.7 °F (37.1 °C) (Temporal)   Resp 20   Ht 5' 8\" (1.727 m)   Wt 84.8 kg (187 lb)   SpO2 98%   BMI 28.43 kg/m²   No LMP for male patient.       Physical Exam     Physical Exam  Constitutional:       General: He is not in acute distress.     Appearance: Normal appearance. He is not ill-appearing.   HENT:      Head: Normocephalic and atraumatic.      Right Ear: Tympanic membrane and ear canal normal.      Left Ear: Tympanic membrane and ear canal normal.      Nose: Congestion and rhinorrhea present. Rhinorrhea is clear.      Mouth/Throat:      Lips: Pink.      Pharynx: Posterior oropharyngeal erythema present. No oropharyngeal exudate.   Cardiovascular:      Rate and Rhythm: Normal rate and regular rhythm.      Heart sounds: Normal heart sounds, S1 normal and S2 normal. No murmur heard.  Pulmonary:      Effort: Pulmonary effort is normal.      Breath sounds: Normal breath sounds and air entry. No wheezing, rhonchi or rales.   Neurological:      Mental Status: He is alert.                   "

## 2024-12-29 DIAGNOSIS — R05.9 COUGH, UNSPECIFIED TYPE: ICD-10-CM

## 2024-12-29 DIAGNOSIS — J40 BRONCHITIS: ICD-10-CM

## 2024-12-30 ENCOUNTER — RESULTS FOLLOW-UP (OUTPATIENT)
Dept: URGENT CARE | Facility: MEDICAL CENTER | Age: 44
End: 2024-12-30

## 2024-12-30 RX ORDER — FLUTICASONE PROPIONATE AND SALMETEROL XINAFOATE 45; 21 UG/1; UG/1
AEROSOL, METERED RESPIRATORY (INHALATION)
Qty: 12 G | Refills: 2 | Status: SHIPPED | OUTPATIENT
Start: 2024-12-30 | End: 2025-01-02

## 2024-12-31 ENCOUNTER — OFFICE VISIT (OUTPATIENT)
Dept: UROLOGY | Facility: CLINIC | Age: 44
End: 2024-12-31
Payer: COMMERCIAL

## 2024-12-31 VITALS
WEIGHT: 181 LBS | OXYGEN SATURATION: 98 % | DIASTOLIC BLOOD PRESSURE: 82 MMHG | HEIGHT: 68 IN | BODY MASS INDEX: 27.43 KG/M2 | HEART RATE: 94 BPM | SYSTOLIC BLOOD PRESSURE: 146 MMHG

## 2024-12-31 DIAGNOSIS — R35.0 BENIGN PROSTATIC HYPERPLASIA WITH URINARY FREQUENCY: Primary | ICD-10-CM

## 2024-12-31 DIAGNOSIS — N40.1 BENIGN PROSTATIC HYPERPLASIA WITH URINARY FREQUENCY: Primary | ICD-10-CM

## 2024-12-31 LAB — POST-VOID RESIDUAL VOLUME, ML POC: 0 ML

## 2024-12-31 PROCEDURE — 51798 US URINE CAPACITY MEASURE: CPT | Performed by: PHYSICIAN ASSISTANT

## 2024-12-31 PROCEDURE — 99203 OFFICE O/P NEW LOW 30 MIN: CPT | Performed by: PHYSICIAN ASSISTANT

## 2024-12-31 RX ORDER — AZITHROMYCIN 250 MG/1
TABLET, FILM COATED ORAL
COMMUNITY
Start: 2024-11-28

## 2024-12-31 NOTE — PROGRESS NOTES
UROLOGY CONSULTATION NOTE       Patient Identifiers: Emerson Joyce (MRN: [id number])  Service Requesting Consultation:      Jose E Joy DO     Service Providing Consultation:  Urology, Ayan Dias PA-C  Consults  Date of Service: 12/31/2024    Reason for Consultation: Urinary frequency    History of Present Illness:     Emerson Joyce is a 44 y.o. male is referred from Dr. Jose E Joy for complaint of increased urinary frequency.  PSA 0.41.  No upper tract imaging.  Recent STI testing and urine dip negative.  Has never seen a urologist before.  Denies any family history of prostate bladder or kidney diseases.  He is a .  He has 2 young children.  He drinks coffee and energy drinks.    Past Medical, Past Surgical History:     Past Medical History:   Diagnosis Date    Acute non-recurrent maxillary sinusitis 2/4/2020    Acute swimmer's ear of left side 2/11/2020    Allergic     Anxiety     GERD (gastroesophageal reflux disease)     Hyperlipidemia     Hypertension     Impacted cerumen, right ear 2/11/2020    Retinal hemorrhage of right eye 1/4/2023   :    Past Surgical History:   Procedure Laterality Date    TONSILECTOMY AND ADNOIDECTOMY      TONSILLECTOMY      UPPER GASTROINTESTINAL ENDOSCOPY     :    Medications, Allergies:     Current Outpatient Medications:     Advair HFA 45-21 MCG/ACT inhaler, 2 PUFFS BY MOUTH TWICE DAILY, Disp: 12 g, Rfl: 2    amLODIPine-benazepril (LOTREL) 10-20 MG per capsule, TAKE 1 CAPSULE BY MOUTH EVERY DAY, Disp: 90 capsule, Rfl: 1    benzonatate (TESSALON) 200 MG capsule, Take 1 capsule (200 mg total) by mouth 3 (three) times a day as needed for cough, Disp: 20 capsule, Rfl: 0    benzonatate (TESSALON) 200 MG capsule, Take 1 capsule (200 mg total) by mouth 3 (three) times a day as needed for cough, Disp: 20 capsule, Rfl: 0    citalopram (CeleXA) 20 mg tablet, TAKE 1 TABLET BY MOUTH EVERY DAY, Disp: 90 tablet, Rfl: 1    ergocalciferol (VITAMIN D2)  50,000 units, Take 1 capsule (50,000 Units total) by mouth once a week, Disp: 12 capsule, Rfl: 3    levothyroxine (Euthyrox) 25 mcg tablet, Take 1 tablet (25 mcg total) by mouth daily, Disp: 90 tablet, Rfl: 0    LORazepam (ATIVAN) 0.5 mg tablet, Take 1 tablet (0.5 mg total) by mouth daily at bedtime, Disp: 30 tablet, Rfl: 5    promethazine-codeine (PHENERGAN WITH CODEINE) 6.25-10 mg/5 mL syrup, Take 5 mL by mouth every 4 (four) hours as needed for cough, Disp: 118 mL, Rfl: 0    rosuvastatin (CRESTOR) 10 MG tablet, Take 1 tablet (10 mg total) by mouth daily, Disp: 90 tablet, Rfl: 3    Allergies:  Allergies   Allergen Reactions    Ceclor [Cefaclor]     Pollen Extract    :    Social and Family History:   Social History:   Social History     Tobacco Use    Smoking status: Never     Passive exposure: Never    Smokeless tobacco: Never   Vaping Use    Vaping status: Never Used   Substance Use Topics    Alcohol use: Yes     Comment: 1 per week    Drug use: No   .    Social History     Tobacco Use   Smoking Status Never    Passive exposure: Never   Smokeless Tobacco Never       Family History:  Family History   Problem Relation Age of Onset    Heart disease Father     Diabetes Father     Hypertension Father     Hypertension Brother     Colon cancer Maternal Uncle     Breast cancer Paternal Grandmother     Heart disease Paternal Grandfather     Other Paternal Grandfather         Acute myocardial infarction    :     Review of Systems:     General: Fever, chills, or night sweats: negative  Cardiac: Negative for chest pain.    Pulmonary: Negative for shortness of breath.  Gastrointestinal: Abdominal pain negative.  Nausea, vomiting, or diarrhea negative,  Genitourinary: See HPI above.  Patient does not have hematuria.  All other systems queried were negative.    Physical Exam:   General: Patient is pleasant and in NAD. Awake and alert  There were no vitals taken for this visit.  HEENT:  Conjunctiva are clear  Constitutional:   pleasant and cooperative     no apparent distress  Cardiac: Peripheral edema: negative  Pulmonary: Non-labored breathing  Abdomen: Soft, non-tender, non-distended.  No surgical scars.  No masses, tenderness, hernias noted.    Genitourinary: Negative CVA tenderness, negative suprapubic tenderness.  Extremities:  Moves all extremities  Neurological:CNII-XII intact. No numbness or tingling. Essentially non focal neurologic exam  Psychiatric:mood affect and behavior normal    (Male): Penis circumcised, phallus normal, meatus patent.  Testicles descended into scrotum bilaterally without masses nor tenderness.  No inguinal hernias bilaterally.  RACHEL: Prostate is enlarged at 40 grams. The prostate is not boggy. The prostate is not tender.  No nodules noted.    Labs:     Lab Results   Component Value Date    HGB 14.7 05/07/2024    HCT 43.6 05/07/2024    WBC 5.71 05/07/2024     05/07/2024   ]    Lab Results   Component Value Date    K 4.2 09/06/2024     09/06/2024    CO2 29 09/06/2024    BUN 15 09/06/2024    CREATININE 0.92 09/06/2024    CALCIUM 9.5 09/06/2024   ]    Imaging:   I personally reviewed the images and report of the following studies, and reviewed them with the patient:  None      ASSESSMENT:     #1.  BPH    PLAN:   -I reviewed the findings with the patient  -Offered reassurance that there were no significant abnormalities  -We did discuss coffee and energy drink consumption as well as stress of work and family life as being contributing factors  -We discussed decreasing his coffee amount and energy drink amount  -Will follow-up in 6 months with PSA prior to visit  -If stable can discharge      Thank you for allowing me to participate in this patients’ care.  Please do not hesitate to call with any additional questions.  Ayan Dias PA-C

## 2025-01-02 DIAGNOSIS — J45.31 MILD PERSISTENT REACTIVE AIRWAY DISEASE WITH ACUTE EXACERBATION: Primary | ICD-10-CM

## 2025-01-02 RX ORDER — FLUTICASONE PROPIONATE AND SALMETEROL 100; 50 UG/1; UG/1
1 POWDER RESPIRATORY (INHALATION) 2 TIMES DAILY
Qty: 60 BLISTER | Refills: 5 | Status: SHIPPED | OUTPATIENT
Start: 2025-01-02 | End: 2025-07-01

## 2025-01-23 DIAGNOSIS — E03.8 SUBCLINICAL HYPOTHYROIDISM: ICD-10-CM

## 2025-01-23 RX ORDER — LEVOTHYROXINE SODIUM 25 UG/1
25 TABLET ORAL DAILY
Qty: 90 TABLET | Refills: 1 | Status: SHIPPED | OUTPATIENT
Start: 2025-01-23

## 2025-01-31 DIAGNOSIS — E55.9 VITAMIN D DEFICIENCY: ICD-10-CM

## 2025-01-31 RX ORDER — ERGOCALCIFEROL 1.25 MG/1
50000 CAPSULE, LIQUID FILLED ORAL WEEKLY
Qty: 12 CAPSULE | Refills: 0 | Status: SHIPPED | OUTPATIENT
Start: 2025-01-31

## 2025-02-04 DIAGNOSIS — F41.9 ANXIETY: ICD-10-CM

## 2025-02-05 RX ORDER — LORAZEPAM 0.5 MG/1
0.5 TABLET ORAL
Qty: 30 TABLET | Refills: 3 | Status: SHIPPED | OUTPATIENT
Start: 2025-02-05

## 2025-02-05 NOTE — TELEPHONE ENCOUNTER
Medication:  PDMP     01/02/2025 08/05/2024 LORazepam (Tablet) 30.0 30 0.5 MG GONZALES LUIS     Active agreement on file -No

## 2025-03-05 DIAGNOSIS — I10 ESSENTIAL HYPERTENSION: ICD-10-CM

## 2025-03-05 RX ORDER — AMLODIPINE AND BENAZEPRIL HYDROCHLORIDE 10; 20 MG/1; MG/1
1 CAPSULE ORAL DAILY
Qty: 90 CAPSULE | Refills: 1 | Status: SHIPPED | OUTPATIENT
Start: 2025-03-05

## 2025-03-26 ENCOUNTER — OFFICE VISIT (OUTPATIENT)
Dept: FAMILY MEDICINE CLINIC | Facility: CLINIC | Age: 45
End: 2025-03-26
Payer: COMMERCIAL

## 2025-03-26 VITALS
DIASTOLIC BLOOD PRESSURE: 80 MMHG | TEMPERATURE: 97.5 F | OXYGEN SATURATION: 98 % | RESPIRATION RATE: 16 BRPM | HEIGHT: 68 IN | WEIGHT: 183 LBS | SYSTOLIC BLOOD PRESSURE: 118 MMHG | BODY MASS INDEX: 27.74 KG/M2 | HEART RATE: 71 BPM

## 2025-03-26 DIAGNOSIS — J01.00 ACUTE NON-RECURRENT MAXILLARY SINUSITIS: Primary | ICD-10-CM

## 2025-03-26 PROCEDURE — 99213 OFFICE O/P EST LOW 20 MIN: CPT | Performed by: FAMILY MEDICINE

## 2025-03-28 ENCOUNTER — APPOINTMENT (OUTPATIENT)
Dept: LAB | Facility: CLINIC | Age: 45
End: 2025-03-28
Payer: COMMERCIAL

## 2025-03-28 ENCOUNTER — RESULTS FOLLOW-UP (OUTPATIENT)
Dept: FAMILY MEDICINE CLINIC | Facility: CLINIC | Age: 45
End: 2025-03-28

## 2025-03-28 DIAGNOSIS — E03.8 SUBCLINICAL HYPOTHYROIDISM: ICD-10-CM

## 2025-03-28 DIAGNOSIS — E78.2 MIXED HYPERLIPIDEMIA: ICD-10-CM

## 2025-03-28 DIAGNOSIS — I10 BENIGN ESSENTIAL HYPERTENSION: ICD-10-CM

## 2025-03-28 LAB
ALBUMIN SERPL BCG-MCNC: 4.5 G/DL (ref 3.5–5)
ALP SERPL-CCNC: 58 U/L (ref 34–104)
ALT SERPL W P-5'-P-CCNC: 25 U/L (ref 7–52)
ANION GAP SERPL CALCULATED.3IONS-SCNC: 7 MMOL/L (ref 4–13)
AST SERPL W P-5'-P-CCNC: 19 U/L (ref 13–39)
BILIRUB SERPL-MCNC: 0.77 MG/DL (ref 0.2–1)
BUN SERPL-MCNC: 12 MG/DL (ref 5–25)
CALCIUM SERPL-MCNC: 9.8 MG/DL (ref 8.4–10.2)
CHLORIDE SERPL-SCNC: 102 MMOL/L (ref 96–108)
CHOLEST SERPL-MCNC: 136 MG/DL (ref ?–200)
CO2 SERPL-SCNC: 29 MMOL/L (ref 21–32)
CREAT SERPL-MCNC: 0.94 MG/DL (ref 0.6–1.3)
ERYTHROCYTE [DISTWIDTH] IN BLOOD BY AUTOMATED COUNT: 11.8 % (ref 11.6–15.1)
GFR SERPL CREATININE-BSD FRML MDRD: 98 ML/MIN/1.73SQ M
GLUCOSE P FAST SERPL-MCNC: 98 MG/DL (ref 65–99)
HCT VFR BLD AUTO: 44.3 % (ref 36.5–49.3)
HDLC SERPL-MCNC: 39 MG/DL
HGB BLD-MCNC: 15.2 G/DL (ref 12–17)
LDLC SERPL CALC-MCNC: 77 MG/DL (ref 0–100)
MCH RBC QN AUTO: 29.7 PG (ref 26.8–34.3)
MCHC RBC AUTO-ENTMCNC: 34.3 G/DL (ref 31.4–37.4)
MCV RBC AUTO: 87 FL (ref 82–98)
PLATELET # BLD AUTO: 292 THOUSANDS/UL (ref 149–390)
PMV BLD AUTO: 8.8 FL (ref 8.9–12.7)
POTASSIUM SERPL-SCNC: 4.2 MMOL/L (ref 3.5–5.3)
PROT SERPL-MCNC: 7.3 G/DL (ref 6.4–8.4)
RBC # BLD AUTO: 5.12 MILLION/UL (ref 3.88–5.62)
SODIUM SERPL-SCNC: 138 MMOL/L (ref 135–147)
TRIGL SERPL-MCNC: 102 MG/DL (ref ?–150)
TSH SERPL DL<=0.05 MIU/L-ACNC: 3.69 UIU/ML (ref 0.45–4.5)
WBC # BLD AUTO: 5.29 THOUSAND/UL (ref 4.31–10.16)

## 2025-03-28 PROCEDURE — 80053 COMPREHEN METABOLIC PANEL: CPT

## 2025-03-28 PROCEDURE — 84443 ASSAY THYROID STIM HORMONE: CPT

## 2025-03-28 PROCEDURE — 85027 COMPLETE CBC AUTOMATED: CPT

## 2025-03-28 PROCEDURE — 80061 LIPID PANEL: CPT

## 2025-03-28 PROCEDURE — 36415 COLL VENOUS BLD VENIPUNCTURE: CPT

## 2025-03-31 ENCOUNTER — OFFICE VISIT (OUTPATIENT)
Dept: FAMILY MEDICINE CLINIC | Facility: CLINIC | Age: 45
End: 2025-03-31
Payer: COMMERCIAL

## 2025-03-31 VITALS
DIASTOLIC BLOOD PRESSURE: 80 MMHG | RESPIRATION RATE: 16 BRPM | TEMPERATURE: 97.5 F | HEIGHT: 68 IN | SYSTOLIC BLOOD PRESSURE: 130 MMHG | BODY MASS INDEX: 27.61 KG/M2 | WEIGHT: 182.2 LBS | OXYGEN SATURATION: 98 % | HEART RATE: 72 BPM

## 2025-03-31 DIAGNOSIS — Z23 ENCOUNTER FOR IMMUNIZATION: ICD-10-CM

## 2025-03-31 DIAGNOSIS — E55.9 VITAMIN D DEFICIENCY: ICD-10-CM

## 2025-03-31 DIAGNOSIS — E78.2 MIXED HYPERLIPIDEMIA: ICD-10-CM

## 2025-03-31 DIAGNOSIS — E03.8 SUBCLINICAL HYPOTHYROIDISM: ICD-10-CM

## 2025-03-31 DIAGNOSIS — I10 BENIGN ESSENTIAL HYPERTENSION: Primary | ICD-10-CM

## 2025-03-31 PROCEDURE — 90715 TDAP VACCINE 7 YRS/> IM: CPT

## 2025-03-31 PROCEDURE — 99214 OFFICE O/P EST MOD 30 MIN: CPT | Performed by: FAMILY MEDICINE

## 2025-03-31 PROCEDURE — 90471 IMMUNIZATION ADMIN: CPT

## 2025-03-31 RX ORDER — ERGOCALCIFEROL 1.25 MG/1
50000 CAPSULE, LIQUID FILLED ORAL WEEKLY
Qty: 12 CAPSULE | Refills: 1 | Status: SHIPPED | OUTPATIENT
Start: 2025-03-31

## 2025-03-31 NOTE — ASSESSMENT & PLAN NOTE
Taking weekly vitamin d  Orders:  •  ergocalciferol (VITAMIN D2) 50,000 units; Take 1 capsule (50,000 Units total) by mouth once a week

## 2025-03-31 NOTE — PROGRESS NOTES
"Name: Emerson Joyce      : 1980      MRN: [id number]  Encounter Provider: Marisa Pagan DO  Encounter Date: 3/31/2025   Encounter department: [facility]    Assessment & Plan  Benign essential hypertension  Stable on current med  Orders:  •  CBC; Future  •  Comprehensive metabolic panel; Future    Mixed hyperlipidemia  Stable on current mes  Orders:  •  Lipid Panel with Direct LDL reflex; Future  •  TSH, 3rd generation with Free T4 reflex; Future    Subclinical hypothyroidism  Was only taking 1/2  Goal is TSH of 2       Vitamin D deficiency  Taking weekly vitamin d  Orders:  •  ergocalciferol (VITAMIN D2) 50,000 units; Take 1 capsule (50,000 Units total) by mouth once a week    Encounter for immunization    Orders:  •  TDAP VACCINE GREATER THAN OR EQUAL TO 8YO IM      Assessment & Plan         History of Present Illness     History of Present Illness  Here for follow up  Labs reviewed  HDL decreased  On crestor 10mg  Seeing Cardiology  Dad with quad bypass at 55       Review of Systems   Constitutional: Negative.    HENT: Negative.     Eyes: Negative.    Respiratory: Negative.     Cardiovascular: Negative.    Gastrointestinal: Negative.    Endocrine: Negative.    Genitourinary: Negative.    Musculoskeletal: Negative.    Allergic/Immunologic: Negative.    Neurological: Negative.    Hematological: Negative.    Psychiatric/Behavioral: Negative.       Objective   /80 (BP Location: Left arm, Patient Position: Sitting, Cuff Size: Large)   Pulse 72   Temp 97.5 °F (36.4 °C) (Tympanic)   Resp 16   Ht 5' 8\" (1.727 m)   Wt 82.6 kg (182 lb 3.2 oz)   SpO2 98%   BMI 27.70 kg/m²     Physical Exam    Physical Exam  Vitals and nursing note reviewed.   Constitutional:       Appearance: Normal appearance. He is well-developed.   HENT:      Head: Normocephalic and atraumatic.      Right Ear: External ear normal.      Left Ear: External ear normal.      Nose: Nose normal.   Eyes:      " Conjunctiva/sclera: Conjunctivae normal.      Pupils: Pupils are equal, round, and reactive to light.   Cardiovascular:      Rate and Rhythm: Normal rate and regular rhythm.      Heart sounds: Normal heart sounds.   Pulmonary:      Effort: Pulmonary effort is normal.      Breath sounds: Normal breath sounds.   Abdominal:      General: Bowel sounds are normal.      Palpations: Abdomen is soft.   Musculoskeletal:         General: Normal range of motion.      Cervical back: Normal range of motion and neck supple.   Skin:     General: Skin is warm and dry.      Capillary Refill: Capillary refill takes less than 2 seconds.   Neurological:      Mental Status: He is alert and oriented to person, place, and time.   Psychiatric:         Behavior: Behavior normal.         Thought Content: Thought content normal.         Judgment: Judgment normal.

## 2025-03-31 NOTE — ASSESSMENT & PLAN NOTE
Stable on current mes  Orders:  •  Lipid Panel with Direct LDL reflex; Future  •  TSH, 3rd generation with Free T4 reflex; Future

## 2025-04-04 ENCOUNTER — OFFICE VISIT (OUTPATIENT)
Dept: CARDIOLOGY CLINIC | Facility: MEDICAL CENTER | Age: 45
End: 2025-04-04
Payer: COMMERCIAL

## 2025-04-04 VITALS
WEIGHT: 182 LBS | DIASTOLIC BLOOD PRESSURE: 70 MMHG | OXYGEN SATURATION: 98 % | SYSTOLIC BLOOD PRESSURE: 128 MMHG | HEIGHT: 68 IN | HEART RATE: 85 BPM | BODY MASS INDEX: 27.58 KG/M2

## 2025-04-04 DIAGNOSIS — E78.5 BORDERLINE HYPERLIPIDEMIA: ICD-10-CM

## 2025-04-04 PROCEDURE — 99214 OFFICE O/P EST MOD 30 MIN: CPT | Performed by: INTERNAL MEDICINE

## 2025-04-04 RX ORDER — ROSUVASTATIN CALCIUM 20 MG/1
20 TABLET, COATED ORAL DAILY
Qty: 90 TABLET | Refills: 3 | Status: SHIPPED | OUTPATIENT
Start: 2025-04-04

## 2025-04-04 NOTE — PATIENT INSTRUCTIONS
Recommendations:  Increase rosuvastatin to 20mg daily.  Continue remainder of medications.   Follow up in one year.

## 2025-04-04 NOTE — PROGRESS NOTES
Cardiology   Emerson Joyce 44 y.o. male MRN: [id number]        Impression:  1. Hypertension - controlled.    2. R retinal hemorrhage - resolved.  3. Dyslipidemia - on statin.  Elevated CT coronary calcium score. Will increase statin.  No indication for checking Lipo(a) as would not change indication.      Recommendations:  Increase rosuvastatin to 20mg daily.  Continue remainder of medications.   Follow up in one year.     HPI: Emerson Joyce is a 44 y.o. year old male with hypertension, R retinal hemorrhage, borderline dyslipidemia, who presents for follow up.  Feels well from a cardiac standpoint.  No chest pain, shortness of breath, or palpitations. Has been on BP medication for 5-10 years. Asymptomatic from cardiac standpoint. No chest pain, shortness of breath, or palpitations. Has elevated CT coronary calcium score in 2021/2025, negative carotid duplex. Recently had an [facility] physical.  Calcium score slightly elevated.  No ischemia.         Review of Systems   Constitutional: Negative.    HENT: Negative.     Eyes: Negative.    Respiratory:  Negative for chest tightness and shortness of breath.    Cardiovascular:  Negative for chest pain, palpitations and leg swelling.   Gastrointestinal: Negative.    Endocrine: Negative.    Genitourinary: Negative.    Musculoskeletal: Negative.    Skin: Negative.    Allergic/Immunologic: Negative.    Neurological: Negative.    Hematological: Negative.    Psychiatric/Behavioral: Negative.     All other systems reviewed and are negative.        Past Medical History:   Diagnosis Date    Acute non-recurrent maxillary sinusitis 02/04/2020    Acute swimmer's ear of left side 02/11/2020    Allergic     Anxiety     Coronary artery disease Chronic    GERD (gastroesophageal reflux disease)     Hyperlipidemia     Hypertension     Impacted cerumen, right ear 02/11/2020    Retinal hemorrhage of right eye 01/04/2023     Past Surgical History:   Procedure Laterality Date     TONSILECTOMY AND ADNOIDECTOMY      TONSILLECTOMY      UPPER GASTROINTESTINAL ENDOSCOPY       Social History     Substance and Sexual Activity   Alcohol Use Yes    Comment: 1 per week     Social History     Substance and Sexual Activity   Drug Use No     Social History     Tobacco Use   Smoking Status Never    Passive exposure: Never   Smokeless Tobacco Never     Family History   Problem Relation Age of Onset    Celiac disease Mother     Heart disease Father         Quad Bypass @ 55    Diabetes Father     Hypertension Father     Hypertension Brother     Heart disease Brother         HBP; High Cholesterol    Colon cancer Maternal Uncle     Breast cancer Paternal Grandmother     Heart disease Paternal Grandfather         Heart Attack @ 60       Allergies:  Allergies   Allergen Reactions    Ceclor [Cefaclor]     Pollen Extract        Medications:     Current Outpatient Medications:     amLODIPine-benazepril (LOTREL) 10-20 MG per capsule, TAKE 1 CAPSULE BY MOUTH EVERY DAY, Disp: 90 capsule, Rfl: 1    amoxicillin-clavulanate (AUGMENTIN) 875-125 mg per tablet, Take 1 tablet by mouth every 12 (twelve) hours for 10 days, Disp: 20 tablet, Rfl: 0    citalopram (CeleXA) 20 mg tablet, TAKE 1 TABLET BY MOUTH EVERY DAY, Disp: 90 tablet, Rfl: 1    ergocalciferol (VITAMIN D2) 50,000 units, Take 1 capsule (50,000 Units total) by mouth once a week, Disp: 12 capsule, Rfl: 1    levothyroxine (Euthyrox) 25 mcg tablet, Take 1 tablet (25 mcg total) by mouth daily, Disp: 90 tablet, Rfl: 1    LORazepam (ATIVAN) 0.5 mg tablet, TAKE 1 TABLET BY MOUTH DAILY AT BEDTIME, Disp: 30 tablet, Rfl: 3    rosuvastatin (CRESTOR) 10 MG tablet, Take 1 tablet (10 mg total) by mouth daily, Disp: 90 tablet, Rfl: 3      Wt Readings from Last 3 Encounters:   04/04/25 82.6 kg (182 lb)   03/31/25 82.6 kg (182 lb 3.2 oz)   03/26/25 83 kg (183 lb)     Temp Readings from Last 3 Encounters:   03/31/25 97.5 °F (36.4 °C) (Tympanic)   03/26/25 97.5 °F (36.4 °C)  "(Tympanic)   12/28/24 98.7 °F (37.1 °C) (Temporal)     BP Readings from Last 3 Encounters:   04/04/25 128/70   03/31/25 130/80   03/26/25 118/80     Pulse Readings from Last 3 Encounters:   04/04/25 85   03/31/25 72   03/26/25 71         Physical Exam  HENT:      Head: Atraumatic.      Mouth/Throat:      Mouth: Mucous membranes are moist.   Eyes:      Extraocular Movements: Extraocular movements intact.   Cardiovascular:      Rate and Rhythm: Normal rate and regular rhythm.      Heart sounds: Normal heart sounds.   Pulmonary:      Effort: Pulmonary effort is normal.      Breath sounds: Normal breath sounds.   Abdominal:      General: Abdomen is flat.   Musculoskeletal:         General: Normal range of motion.      Cervical back: Normal range of motion.   Skin:     General: Skin is warm.   Neurological:      General: No focal deficit present.      Mental Status: He is alert and oriented to person, place, and time.   Psychiatric:         Mood and Affect: Mood normal.         Behavior: Behavior normal.           Laboratory Studies:  CMP:  Lab Results   Component Value Date    K 4.2 03/28/2025     03/28/2025    CO2 29 03/28/2025    BUN 12 03/28/2025    CREATININE 0.94 03/28/2025    AST 19 03/28/2025    ALT 25 03/28/2025    EGFR 98 03/28/2025       Lipid Profile:   No results found for: \"CHOL\"  Lab Results   Component Value Date    HDL 39 (L) 03/28/2025     Lab Results   Component Value Date    LDLCALC 77 03/28/2025     Lab Results   Component Value Date    TRIG 102 03/28/2025               "

## 2025-04-12 DIAGNOSIS — E78.5 BORDERLINE HYPERLIPIDEMIA: ICD-10-CM

## 2025-04-14 DIAGNOSIS — R21 RASH: Primary | ICD-10-CM

## 2025-04-14 RX ORDER — BETAMETHASONE DIPROPIONATE 0.5 MG/G
CREAM TOPICAL 2 TIMES DAILY
Qty: 45 G | Refills: 0 | Status: SHIPPED | OUTPATIENT
Start: 2025-04-14

## 2025-04-14 RX ORDER — ROSUVASTATIN CALCIUM 10 MG/1
10 TABLET, COATED ORAL DAILY
Qty: 90 TABLET | Refills: 3 | OUTPATIENT
Start: 2025-04-14

## 2025-05-20 DIAGNOSIS — F41.1 GENERALIZED ANXIETY DISORDER: ICD-10-CM

## 2025-05-20 RX ORDER — CITALOPRAM HYDROBROMIDE 20 MG/1
20 TABLET ORAL DAILY
Qty: 90 TABLET | Refills: 1 | Status: SHIPPED | OUTPATIENT
Start: 2025-05-20

## 2025-06-11 DIAGNOSIS — M25.542 ARTHRALGIA OF BOTH HANDS: Primary | ICD-10-CM

## 2025-06-11 DIAGNOSIS — M25.541 ARTHRALGIA OF BOTH HANDS: Primary | ICD-10-CM

## 2025-06-24 DIAGNOSIS — F41.9 ANXIETY: ICD-10-CM

## 2025-06-24 RX ORDER — LORAZEPAM 0.5 MG/1
0.5 TABLET ORAL
Qty: 30 TABLET | Refills: 5 | Status: SHIPPED | OUTPATIENT
Start: 2025-06-24

## 2025-06-24 NOTE — TELEPHONE ENCOUNTER
Medication:  PDMP     05/23/2025 02/05/2025 LORazepam (Tablet) 30.0 30 0.5 MG GONZALES LUIS        Active agreement on file -No

## 2025-06-27 ENCOUNTER — APPOINTMENT (OUTPATIENT)
Dept: LAB | Facility: CLINIC | Age: 45
End: 2025-06-27
Attending: PHYSICIAN ASSISTANT
Payer: COMMERCIAL

## 2025-06-27 DIAGNOSIS — R35.0 BENIGN PROSTATIC HYPERPLASIA WITH URINARY FREQUENCY: ICD-10-CM

## 2025-06-27 DIAGNOSIS — M25.541 ARTHRALGIA OF BOTH HANDS: ICD-10-CM

## 2025-06-27 DIAGNOSIS — M25.542 ARTHRALGIA OF BOTH HANDS: ICD-10-CM

## 2025-06-27 DIAGNOSIS — N40.1 BENIGN PROSTATIC HYPERPLASIA WITH URINARY FREQUENCY: ICD-10-CM

## 2025-06-27 LAB
B BURGDOR IGG+IGM SER QL IA: NEGATIVE
PSA SERPL-MCNC: 0.44 NG/ML (ref 0–4)

## 2025-06-27 PROCEDURE — 36415 COLL VENOUS BLD VENIPUNCTURE: CPT

## 2025-06-27 PROCEDURE — 86618 LYME DISEASE ANTIBODY: CPT

## 2025-06-27 PROCEDURE — 84153 ASSAY OF PSA TOTAL: CPT

## 2025-06-30 ENCOUNTER — OFFICE VISIT (OUTPATIENT)
Dept: UROLOGY | Facility: CLINIC | Age: 45
End: 2025-06-30
Payer: COMMERCIAL

## 2025-06-30 VITALS
HEIGHT: 68 IN | SYSTOLIC BLOOD PRESSURE: 132 MMHG | OXYGEN SATURATION: 98 % | HEART RATE: 93 BPM | BODY MASS INDEX: 27.28 KG/M2 | WEIGHT: 180 LBS | DIASTOLIC BLOOD PRESSURE: 72 MMHG

## 2025-06-30 DIAGNOSIS — N39.8 VOIDING DYSFUNCTION: Primary | ICD-10-CM

## 2025-06-30 PROCEDURE — 99213 OFFICE O/P EST LOW 20 MIN: CPT | Performed by: PHYSICIAN ASSISTANT

## 2025-06-30 NOTE — PROGRESS NOTES
Name: Emerson Joyce      : 1980      MRN: [id number]  Encounter Provider: Ayan Dias PA-C  Encounter Date: 2025   Encounter department: [facility]  :  Assessment & Plan  Voiding dysfunction  All his symptoms have resolved PSA is normal and there were no abnormalities on his previous prostate exam  May follow-up with urology on an as-needed basis  Per guidelines PSA can be done in 2 years or at age 50 and annually thereafter  He will call with any further questions or concerns           History of Present Illness   Emerson Joyce is a 45 y.o. male who presents with complaint of increased urinary frequency.  I saw him in December.  His urine dip and PSA were normal.  He has no family history of prostate cancer or kidney diseases.  He returns today with complete resolution of his symptoms.  Follow-up PSA 0.43.    Review of Systems       Objective   There were no vitals taken for this visit.    Physical Exam GEN: no acute distress    RESP: breathing comfortably with no accessory muscle use    ABD: soft, non-tender, non-distended   INCISION:    EXT: no significant peripheral edema       RADIOLOGY:   none        Results   Lab Results   Component Value Date    PSA 0.437 2025    PSA 0.41 2024     Lab Results   Component Value Date    CALCIUM 9.8 2025    K 4.2 2025    CO2 29 2025     2025    BUN 12 2025    CREATININE 0.94 2025     Lab Results   Component Value Date    WBC 5.29 2025    HGB 15.2 2025    HCT 44.3 2025    MCV 87 2025     2025       Office Urine Dip  No results found for this or any previous visit (from the past hour).